# Patient Record
Sex: FEMALE | Race: WHITE | Employment: OTHER | URBAN - METROPOLITAN AREA
[De-identification: names, ages, dates, MRNs, and addresses within clinical notes are randomized per-mention and may not be internally consistent; named-entity substitution may affect disease eponyms.]

---

## 2017-01-04 ENCOUNTER — OFFICE VISIT (OUTPATIENT)
Dept: ORTHOPEDIC SURGERY | Age: 67
End: 2017-01-04

## 2017-01-04 VITALS
HEART RATE: 81 BPM | SYSTOLIC BLOOD PRESSURE: 113 MMHG | TEMPERATURE: 98.3 F | RESPIRATION RATE: 18 BRPM | BODY MASS INDEX: 24.04 KG/M2 | HEIGHT: 64 IN | DIASTOLIC BLOOD PRESSURE: 70 MMHG | OXYGEN SATURATION: 99 % | WEIGHT: 140.8 LBS

## 2017-01-04 DIAGNOSIS — M47.816 FACET ARTHRITIS OF LUMBAR REGION: ICD-10-CM

## 2017-01-04 DIAGNOSIS — M47.812 SPONDYLOSIS OF CERVICAL REGION WITHOUT MYELOPATHY OR RADICULOPATHY: Chronic | ICD-10-CM

## 2017-01-04 RX ORDER — HYDROCODONE BITARTRATE AND ACETAMINOPHEN 10; 325 MG/1; MG/1
1 TABLET ORAL
Qty: 30 TAB | Refills: 0 | Status: SHIPPED | OUTPATIENT
Start: 2017-01-15 | End: 2018-04-19 | Stop reason: SDUPTHER

## 2017-01-04 RX ORDER — METHOCARBAMOL 500 MG/1
250-500 TABLET, FILM COATED ORAL
Qty: 90 TAB | Refills: 2 | Status: SHIPPED | OUTPATIENT
Start: 2017-01-04 | End: 2018-04-19

## 2017-01-04 NOTE — PROGRESS NOTES
[facility] 2.  [address], [address]  [place], [address] [place] [address] Street  Phone: [phone]  Fax: [phone]        Brennon Davila  : 1950  PCP: Kamran Cervantes MD    PROGRESS NOTE      ASSESSMENT AND PLAN    Cervical and lumbar spondylosis    1. Mechanical symptoms w/prn medication use. Neuro intact. 2. Continue Norco, Robaxin, and Naprosyn prn.   3. Discussed with pt transitioning medication refills to Dr. Say Richards as pt has been stable and using small amounts of Norco.   4. Given back care instructions. Cont HEP    Follow-up Disposition:  Return if symptoms worsen or fail to improve. Risks and benefits of ongoing opiate therapy have been reviewed with the patient.  is appropriate. UDS results have been consistent. No pain behaviors. Pt has a good risk to benefit ratio which allows the pt to function in a home environment without side effects. HISTORY OF PRESENT ILLNESS  Rome León is a 77 y.o. female. Retired teacher. Pt presents to the office for a f/u visit for cervical and lumbar spondylosis. Pt continues to have back pain. She denies any shooting pain in her BLE. She denies any weakness in her BLE. Her pain will become intensified with bending, twisting, standing, and prolonged sitting. She denies any saddle paresthesia. She continues to have some neck pain. Her pain is mainly when she turns her head to the left. She denies any shooting pain in her BUE. She denies any weakness in her BUE. She denies any loss of dexterity. Pt takes Robaxin PRN. She takes Naprosyn PRN with benefit. Pt takes Norco  mg 3-4 tabs a week. The more she does, the more she will have to take. Pt denies any dizziness, confusion, uncontrolled constipation, and cravings due to controlled substances. Denies persistent fevers, chills, weight changes, neurogenic bowel or bladder symptoms. Pt denies recent ED visits or hospitalizations.      She has now retired and is enjoying her senior care. Pt is no longer wearing her CAM walker but continues to have problems with her right leg as she can still see a fracture line. Pt uses a machine on her leg every night. Pain Assessment  1/4/2017   Location of Pain Neck;Back;Knee   Location Modifiers -   Severity of Pain 3   Quality of Pain Sharp;Dull;Aching   Duration of Pain Persistent   Frequency of Pain Constant   Aggravating Factors Bending;Kneeling;Squatting;Standing;Walking;Stairs   Limiting Behavior Yes   Relieving Factors Rest;Other (Comment)   Relieving Factors Comment medication   Result of Injury No       PAST MEDICAL HISTORY   Past Medical History   Diagnosis Date    Anxiety disorder     Arthritis      Basal joint arthritis, left thumb    GERD (gastroesophageal reflux disease)     Headache(784.0)     Hearing reduced     Left knee pain     Lower back pain July 2010    Menopause      Age 39    Osteoarthritis of left knee     Pain of left thumb     Sacroiliitis ([address] 9/15/2010    SI (sacroiliac) joint dysfunction     Sinus trouble     Ulcer ([address] 2006     polyp, positive heme occult stools, denies ulcer       Past Surgical History   Procedure Laterality Date    Hx tubal ligation      Hx orthopaedic  9/15/2010     Left SI joint injection 30mg Celestone and 4 cc Lidocaine   . MEDICATIONS      Current Outpatient Prescriptions   Medication Sig Dispense Refill    [START ON 1/15/2017] HYDROcodone-acetaminophen (NORCO)  mg tablet Take 1 Tab by mouth daily as needed for Pain. Indications: PAIN 30 Tab 0    methocarbamol (ROBAXIN) 500 mg tablet Take 0.5-1 Tabs by mouth three (3) times daily as needed. Indications: MUSCLE SPASM 90 Tab 2    HYDROcodone-acetaminophen (NORCO)  mg tablet Take 1 Tab by mouth daily. Max Daily Amount: 1 Tab. Do not fill before 09/30/2016  Indications: PAIN 30 Tab 0    B.infantis-B.ani-B.long-B.bifi 10-15 mg TbEC Take  by mouth.       naproxen (NAPROSYN) 500 mg tablet Take 1 Tab by mouth two (2) times daily (with meals). Indications: OSTEOARTHRITIS 60 Tab 2    levocetirizine (XYZAL) 5 mg tablet Take 5 mg by mouth. q pm prn      FLUoxetine (PROZAC) 20 mg capsule Take 60 mg by mouth daily.  traZODone (DESYREL) 50 mg tablet Take 100 mg by mouth nightly.  ALPRAZolam (XANAX) 0.5 mg tablet Take  by mouth nightly as needed.  ascorbic acid (VITAMIN C) 500 mg tablet Take 500 mg by mouth daily.  cyanocobalamin (VITAMIN B-12) 500 mcg tablet Take 500 mcg by mouth daily.  omega-3 fatty acids-vitamin e (FISH OIL) 1,000 mg Cap Take 1 Cap by mouth daily.  calcium 600 mg Cap Take  by mouth daily.  aspirin 81 mg tablet Take 81 mg by mouth.  FERROUS SULFATE (SLOW FE PO) Take  by mouth daily.  OMEPRAZOLE MAGNESIUM (PRILOSEC OTC PO) Take 40 mg by mouth daily. ALLERGIES    Allergies   Allergen Reactions    Topamax [Topiramate] Other (comments)     Whole body tingling  Blurry vision          SOCIAL HISTORY    Social History     Social History    Marital status:      Spouse name: N/A    Number of children: N/A    Years of education: N/A     Occupational History    teacher's asst      full time     Social History Main Topics    Smoking status: Never Smoker    Smokeless tobacco: Never Used    Alcohol use No    Drug use: No    Sexual activity: Not on file      Comment: BTL     Other Topics Concern    Not on file     Social History Narrative     Social History Narrative      Problem Relation Age of Onset    Heart Failure Mother       at age 80 of congestive heart failure    Heart Disease Mother     Cancer Father       at age 54 of lung cancer; smoker and asbestos exposure       REVIEW OF SYSTEMS  Review of Systems   Constitutional: Negative for chills, fever and weight loss. Respiratory: Negative for shortness of breath. Cardiovascular: Negative for chest pain. Gastrointestinal: Negative for constipation. Negative for fecal incontinence   Genitourinary: Negative for dysuria. Negative for urinary incontinence   Musculoskeletal:        Per HPI   Skin: Negative for rash. Neurological: Negative for dizziness, tingling, tremors, focal weakness and headaches. Endo/Heme/Allergies: Does not bruise/bleed easily. Psychiatric/Behavioral: The patient does not have insomnia. PHYSICAL EXAMINATION  Visit Vitals    /70    Pulse 81    Temp 98.3 °F (36.8 °C) (Oral)    Resp 18    Ht 5' 4\" (1.626 m)    Wt 140 lb 12.8 oz (63.9 kg)    SpO2 99%    BMI 24.17 kg/m2         Accompanied by self. Constitutional:  Well developed, well nourished, in no acute distress. Psychiatric: Affect and mood are appropriate. Integumentary: No rashes or abrasions noted on exposed areas. Cardiovascular/Peripheral Vascular: Intact l pulses. No peripheral edema is noted. Lymphatic:  No evidence of lymphedema. No cervical lymphadenopathy. SPINE/MUSCULOSKELETAL EXAM      Cervical spine:  Neck is midline. Normal muscle tone. No focal atrophy is noted. Shoulder ROM intact. Negative Spurling's sign. Negative Tinel's sign. Negative Kyle's sign. Sensation grossly intact to light touch. Lumbar spine:  No rash, ecchymosis, or gross obliquity. No fasciculations. No focal atrophy is noted. Tenderness to palpation L4-5. No tenderness to palpation at the sciatic notch. SI joints non-tender. Trochanters non tender. Sensation grossly intact to light touch. MOTOR:          Biceps  Triceps Deltoids Wrist Ext Wrist Flex Hand Intrin   Right +4/5 +4/5 +4/5 +4/5 +4/5 +4/5   Left +4/5 +4/5 +4/5 +4/5 +4/5 +4/5         Hip Flex  Quads Hamstrings Ankle DF EHL Ankle PF   Right +4/5 +4/5 +4/5 +4/5 +4/5 +4/5   Left +4/5 +4/5 +4/5 +4/5 +4/5 +4/5     Straight Leg raise negative. Squat not tested. Ambulation without assistive device. FWB.     Written by Omega Westfall, as dictated by Ashley Nephew, MD.    Ms. Kaleb Warren may have a reminder for a \"due or due soon\" health maintenance. I have asked that she contact her primary care provider for follow-up on this health maintenance.

## 2017-01-04 NOTE — MR AVS SNAPSHOT
Visit Information Date & Time Provider Department Dept. Phone Encounter #  
 1/4/2017 10:45 AM Uyen Lopez MD [facility] [phone] [id number] Follow-up Instructions Return if symptoms worsen or fail to improve. Your Appointments 1/9/2017  1:40 PM  
Follow Up with Brittney Huynh MD  
[address] ([facility]) Appt Note: MRI Results from 12/30/16 [address], [address] [facility] [address]  
[phone] [address] [facility]  
  
    
 1/11/2017 10:30 AM  
PROBLEM VISIT with Dennis Kearney MD  
[address] [place] [address], [address] [facility] [address] ([facility]) Appt Note: LT HAND/THUMB/REQ [address], [address] [facility] [address]  
[phone] [address] [facility] Upcoming Health Maintenance Date Due Hepatitis C Screening 1950 FOBT Q 1 YEAR AGE 50-75 8/24/2000 ZOSTER VACCINE AGE 60> 8/24/2010 GLAUCOMA SCREENING Q2Y 8/24/2015 Pneumococcal 65+ Low/Medium Risk (1 of 2 - PCV13) 8/24/2015 MEDICARE YEARLY EXAM 8/24/2015 INFLUENZA AGE 9 TO ADULT 8/1/2016 BREAST CANCER SCRN MAMMOGRAM 8/16/2018 DTaP/Tdap/Td series (2 - Td) 5/17/2023 Allergies as of 1/4/2017  Review Complete On: 1/4/2017 By: Uyen Lopez MD  
  
 Severity Noted Reaction Type Reactions Topamax [Topiramate]  04/07/2016    Other (comments) Whole body tingling Blurry vision Current Immunizations  Never Reviewed Name Date Tdap 5/17/2013  7:57 PM  
  
 Not reviewed this visit You Were Diagnosed With   
  
 Codes Comments Neck pain, chronic     ICD-10-CM: M54.2, G89.29 ICD-9-CM: 723.1, 338.29 Spondylosis of cervical region without myelopathy or radiculopathy     ICD-10-CM: M47.812 ICD-9-CM: 721.0 Facet arthritis of lumbar region     ICD-10-CM: M46.96 
ICD-9-CM: 721.3 Vitals BP Pulse Temp Resp Height(growth percentile) Weight(growth percentile) 113/70 81 98.3 °F (36.8 °C) (Oral) 18 5' 4\" (1.626 m) 140 lb 12.8 oz (63.9 kg) SpO2 BMI OB Status Smoking Status 99% 24.17 kg/m2 Postmenopausal Never Smoker BMI and BSA Data Body Mass Index Body Surface Area  
 24.17 kg/m 2 1.7 m 2 Preferred Pharmacy Pharmacy Name Phone [facility] [address] [phone] Your Updated Medication List  
  
   
This list is accurate as of: 1/4/17 11:59 AM.  Always use your most recent med list.  
  
  
  
  
 aspirin 81 mg tablet Take 81 mg by mouth. B.infantis-B.ani-B.long-B.bifi 10-15 mg Tbec Take  by mouth.  
  
 calcium 600 mg Cap Take  by mouth daily. FISH OIL 1,000 mg Cap Generic drug:  omega-3 fatty acids-vitamin e Take 1 Cap by mouth daily. * HYDROcodone-acetaminophen  mg tablet Commonly known as:  [place] Take 1 Tab by mouth daily. Max Daily Amount: 1 Tab. Do not fill before 09/30/2016  Indications: PAIN  
  
 * HYDROcodone-acetaminophen  mg tablet Commonly known as:  [place] Take 1 Tab by mouth daily as needed for Pain. Indications: PAIN Start taking on:  1/15/2017  
  
 methocarbamol 500 mg tablet Commonly known as:  ROBAXIN Take 0.5-1 Tabs by mouth three (3) times daily as needed. Indications: MUSCLE SPASM  
  
 naproxen 500 mg tablet Commonly known as:  NAPROSYN Take 1 Tab by mouth two (2) times daily (with meals). Indications: OSTEOARTHRITIS PRILOSEC OTC PO Take 40 mg by mouth daily. PROzac 20 mg capsule Generic drug:  FLUoxetine Take 60 mg by mouth daily. SLOW FE PO Take  by mouth daily. traZODone 50 mg tablet Commonly known as:  Callaway Gutierrez Take 100 mg by mouth nightly. VITAMIN B-12 500 mcg tablet Generic drug:  cyanocobalamin Take 500 mcg by mouth daily. VITAMIN C 500 mg tablet Generic drug:  ascorbic acid (vitamin C) Take 500 mg by mouth daily. XANAX 0.5 mg tablet Generic drug:  ALPRAZolam  
Take  by mouth nightly as needed. XYZAL 5 mg tablet Generic drug:  levocetirizine Take 5 mg by mouth. q pm prn * Notice: This list has 2 medication(s) that are the same as other medications prescribed for you. Read the directions carefully, and ask your doctor or other care provider to review them with you. Prescriptions Printed Refills HYDROcodone-acetaminophen (NORCO)  mg tablet 0 Starting on: 1/15/2017 Sig: Take 1 Tab by mouth daily as needed for Pain. Indications: PAIN Class: Print Route: Oral  
  
Prescriptions Sent to Pharmacy Refills  
 methocarbamol (ROBAXIN) 500 mg tablet 2 Sig: Take 0.5-1 Tabs by mouth three (3) times daily as needed. Indications: MUSCLE SPASM Class: Normal  
 Pharmacy: [address]  #: [phone] Route: Oral  
  
Follow-up Instructions Return if symptoms worsen or fail to improve. To-Do List   
 01/04/2017 Lab:  DRUG SCREEN UR - W/ CONFIRM Patient Instructions [employer] Activation Thank you for requesting access to [employer]. Please follow the instructions below to securely access and download your online medical record. [employer] allows you to send messages to your doctor, view your test results, renew your prescriptions, schedule appointments, and more. How Do I Sign Up? 1. In your internet browser, go to [email or web address] 
2. Click on the First Time User? Click Here link in the Sign In box. You will be redirect to the New Member Sign Up page. 3. Enter your [employer] Access Code exactly as it appears below. You will not need to use this code after youve completed the sign-up process.  If you do not sign up before the expiration date, you must request a new code. [employer] Access Code: [email or web address] Expires: 2017  3:22 PM (This is the date your [employer] access code will ) 4. Enter the last four digits of your Social Security Number (xxxx) and Date of Birth (mm/dd/yyyy) as indicated and click Submit. You will be taken to the next sign-up page. 5. Create a [employer] ID. This will be your [employer] login ID and cannot be changed, so think of one that is secure and easy to remember. 6. Create a [employer] password. You can change your password at any time. 7. Enter your Password Reset Question and Answer. This can be used at a later time if you forget your password. 8. Enter your e-mail address. You will receive e-mail notification when new information is available in [address] 9. Click Sign Up. You can now view and download portions of your medical record. 10. Click the Download Summary menu link to download a portable copy of your medical information. Additional Information If you have questions, please visit the Frequently Asked Questions section of the [employer] website at [email or web address] Remember, [employer] is NOT to be used for urgent needs. For medical emergencies, dial 911. Back Care and Preventing Injuries: Care Instructions Your Care Instructions You can hurt your back doing many everyday activities: lifting a heavy box, bending down to garden, exercising at the gym, and even getting out of bed. But you can keep your back strong and healthy by doing some exercises. You also can follow a few tips for sitting, sleeping, and lifting to avoid hurting your back again. Talk to your doctor before you start an exercise program. Ask for help if you want to learn more about keeping your back healthy. Follow-up care is a key part of your treatment and safety.  Be sure to make and go to all appointments, and call your doctor if you are having problems. It's also a good idea to know your test results and keep a list of the medicines you take. How can you care for yourself at home? · Stay at a healthy weight to avoid strain on your lower back. · Do not smoke. Smoking increases the risk of osteoporosis, which weakens the spine. If you need help quitting, talk to your doctor about stop-smoking programs and medicines. These can increase your chances of quitting for good. · Make sure you sleep in a position that maintains your back's normal curves and on a mattress that feels comfortable. Sleep on your side with a pillow between your knees, or sleep on your back with a pillow under your knees. These positions can reduce strain on your back. · When you get out of bed, lie on your side and bend both knees. Drop your feet over the edge of the bed as you push up with both arms. Scoot to the edge of the bed. Make sure your feet are in line with your rear end (buttocks), and then stand up. · If you must stand for a long time, put one foot on a stool, ledge, or box. Exercise to strengthen your back and other muscles · Get at least 30 minutes of exercise on most days of the week. Walking is a good choice. You also may want to do other activities, such as running, swimming, cycling, or playing tennis or team sports. · Stretch your back muscles. Here are few exercises to try: ¨ Lie on your back with your knees bent and your feet flat on the floor. Gently pull one bent knee to your chest. Put that foot back on the floor, and then pull the other knee to your chest. Hold for 15 to 30 seconds. Repeat 2 to 4 times. ¨ Do pelvic tilts. Lie on your back with your knees bent. Tighten your stomach muscles. Pull your belly button (navel) in and up toward your ribs. You should feel like your back is pressing to the floor and your hips and pelvis are slightly lifting off the floor. Hold for 6 seconds while breathing smoothly. · Keep your core muscles strong. The muscles of your back, belly (abdomen), and buttocks support your spine. ¨ Pull in your belly, and imagine pulling your navel toward your spine. Hold this for 6 seconds, then relax. Remember to keep breathing normally as you tense your muscles. ¨ Do curl-ups. Always do them with your knees bent. Keep your low back on the floor, and curl your shoulders toward your knees using a smooth, slow motion. Keep your arms folded across your chest. If this bothers your neck, try putting your hands behind your neck (not your head), with your elbows spread apart. ¨ Lie on your back with your knees bent and your feet flat on the floor. Tighten your belly muscles, and then push with your feet and raise your buttocks up a few inches. Hold this position 6 seconds as you continue to breathe normally, then lower yourself slowly to the floor. Repeat 8 to 12 times. ¨ If you like group exercise, try Pilates or yoga. These classes have poses that strengthen the core muscles. Protect your back when you sit · Place a small pillow, a rolled-up towel, or a lumbar roll in the curve of your back if you need extra support. · Sit in a chair that is low enough to let you place both feet flat on the floor with both knees nearly level with your hips. If your chair or desk is too high, use a foot rest to raise your knees. · When driving, keep your knees nearly level with your hips. Sit straight, and drive with both hands on the steering wheel. Your arms should be in a slightly bent position. · Try a kneeling chair, which helps tilt your hips forward. This takes pressure off your lower back. · Try sitting on an exercise ball. It can rock from side to side, which helps keep your back loose. Lift properly · Squat down, bending at the hips and knees only. If you need to, put one knee to the floor and extend your other knee in front of you, bent at a right angle (half kneeling). · Press your chest straight forward. This helps keep your upper back straight while keeping a slight arch in your low back. · Hold the load as close to your body as possible, at the level of your navel. · Use your feet to change direction, taking small steps. · Lead with your hips as you change direction. Keep your shoulders in line with your hips as you move. Do not twist your body. · Set down your load carefully, squatting with your knees and hips only. When should you call for help? Watch closely for changes in your health, and be sure to contact your doctor if: 
· You want more exercises to make your back and other core muscles stronger. Where can you learn more? Go to http://mandy-yari.info/. Enter S810 in the search box to learn more about \"Back Care and Preventing Injuries: Care Instructions. \" Current as of: May 23, 2016 Content Version: 11.1 © 3613-3094 [employer]. Care instructions adapted under license by [employer] (which disclaims liability or warranty for this information). If you have questions about a medical condition or this instruction, always ask your healthcare professional. [place] [address] any warranty or liability for your use of this information. Introducing [facility]! [facility] introduces [employer] patient portal. Now you can access parts of your medical record, email your doctor's office, and request medication refills online. 1. In your internet browser, go to [email or web address] 2. Click on the First Time User? Click Here link in the Sign In box. You will see the New Member Sign Up page. 3. Enter your [employer] Access Code exactly as it appears below. You will not need to use this code after youve completed the sign-up process. If you do not sign up before the expiration date, you must request a new code.  
 
· [employer] Access Code: [email or web address] 
 Expires: 1/17/2017  3:22 PM 
 
4. Enter the last four digits of your Social Security Number (xxxx) and Date of Birth (mm/dd/yyyy) as indicated and click Submit. You will be taken to the next sign-up page. 5. Create a [employer] ID. This will be your [employer] login ID and cannot be changed, so think of one that is secure and easy to remember. 6. Create a [employer] password. You can change your password at any time. 7. Enter your Password Reset Question and Answer. This can be used at a later time if you forget your password. 8. Enter your e-mail address. You will receive e-mail notification when new information is available in [address] 9. Click Sign Up. You can now view and download portions of your medical record. 10. Click the Download Summary menu link to download a portable copy of your medical information. If you have questions, please visit the Frequently Asked Questions section of the [employer] website. Remember, [employer] is NOT to be used for urgent needs. For medical emergencies, dial 911. Now available from your iPhone and Android! Please provide this summary of care documentation to your next provider. Your primary care clinician is listed as Jaciel Marrero. If you have any questions after today's visit, please call [phone].

## 2017-01-04 NOTE — PATIENT INSTRUCTIONS
Fredio Activation    Thank you for requesting access to Fredio. Please follow the instructions below to securely access and download your online medical record. Fredio allows you to send messages to your doctor, view your test results, renew your prescriptions, schedule appointments, and more. How Do I Sign Up? 1. In your internet browser, go to [email or web address]  2. Click on the First Time User? Click Here link in the Sign In box. You will be redirect to the New Member Sign Up page. 3. Enter your Fredio Access Code exactly as it appears below. You will not need to use this code after youve completed the sign-up process. If you do not sign up before the expiration date, you must request a new code. Fredio Access Code: [email or web address]  Expires: 2017  3:22 PM (This is the date your Fredio access code will )    4. Enter the last four digits of your Social Security Number (xxxx) and Date of Birth (mm/dd/yyyy) as indicated and click Submit. You will be taken to the next sign-up page. 5. Create a Fredio ID. This will be your Fredio login ID and cannot be changed, so think of one that is secure and easy to remember. 6. Create a Fredio password. You can change your password at any time. 7. Enter your Password Reset Question and Answer. This can be used at a later time if you forget your password. 8. Enter your e-mail address. You will receive e-mail notification when new information is available in [address]. 9. Click Sign Up. You can now view and download portions of your medical record. 10. Click the Download Summary menu link to download a portable copy of your medical information. Additional Information    If you have questions, please visit the Frequently Asked Questions section of the Fredio website at [email or web address] Remember, Fredio is NOT to be used for urgent needs. For medical emergencies, dial 911.          Back Care and Preventing Injuries: Care Instructions  Your Care Instructions  You can hurt your back doing many everyday activities: lifting a heavy box, bending down to garden, exercising at the gym, and even getting out of bed. But you can keep your back strong and healthy by doing some exercises. You also can follow a few tips for sitting, sleeping, and lifting to avoid hurting your back again. Talk to your doctor before you start an exercise program. Ask for help if you want to learn more about keeping your back healthy. Follow-up care is a key part of your treatment and safety. Be sure to make and go to all appointments, and call your doctor if you are having problems. It's also a good idea to know your test results and keep a list of the medicines you take. How can you care for yourself at home? · Stay at a healthy weight to avoid strain on your lower back. · Do not smoke. Smoking increases the risk of osteoporosis, which weakens the spine. If you need help quitting, talk to your doctor about stop-smoking programs and medicines. These can increase your chances of quitting for good. · Make sure you sleep in a position that maintains your back's normal curves and on a mattress that feels comfortable. Sleep on your side with a pillow between your knees, or sleep on your back with a pillow under your knees. These positions can reduce strain on your back. · When you get out of bed, lie on your side and bend both knees. Drop your feet over the edge of the bed as you push up with both arms. Scoot to the edge of the bed. Make sure your feet are in line with your rear end (buttocks), and then stand up. · If you must stand for a long time, put one foot on a stool, ledge, or box. Exercise to strengthen your back and other muscles  · Get at least 30 minutes of exercise on most days of the week. Walking is a good choice.  You also may want to do other activities, such as running, swimming, cycling, or playing tennis or team sports. · Stretch your back muscles. Here are few exercises to try:  Ana Peri on your back with your knees bent and your feet flat on the floor. Gently pull one bent knee to your chest. Put that foot back on the floor, and then pull the other knee to your chest. Hold for 15 to 30 seconds. Repeat 2 to 4 times. ¨ Do pelvic tilts. Lie on your back with your knees bent. Tighten your stomach muscles. Pull your belly button (navel) in and up toward your ribs. You should feel like your back is pressing to the floor and your hips and pelvis are slightly lifting off the floor. Hold for 6 seconds while breathing smoothly. · Keep your core muscles strong. The muscles of your back, belly (abdomen), and buttocks support your spine. ¨ Pull in your belly, and imagine pulling your navel toward your spine. Hold this for 6 seconds, then relax. Remember to keep breathing normally as you tense your muscles. ¨ Do curl-ups. Always do them with your knees bent. Keep your low back on the floor, and curl your shoulders toward your knees using a smooth, slow motion. Keep your arms folded across your chest. If this bothers your neck, try putting your hands behind your neck (not your head), with your elbows spread apart. ¨ Lie on your back with your knees bent and your feet flat on the floor. Tighten your belly muscles, and then push with your feet and raise your buttocks up a few inches. Hold this position 6 seconds as you continue to breathe normally, then lower yourself slowly to the floor. Repeat 8 to 12 times. ¨ If you like group exercise, try Pilates or yoga. These classes have poses that strengthen the core muscles. Protect your back when you sit  · Place a small pillow, a rolled-up towel, or a lumbar roll in the curve of your back if you need extra support. · Sit in a chair that is low enough to let you place both feet flat on the floor with both knees nearly level with your hips.  If your chair or desk is too high, use a foot rest to raise your knees. · When driving, keep your knees nearly level with your hips. Sit straight, and drive with both hands on the steering wheel. Your arms should be in a slightly bent position. · Try a kneeling chair, which helps tilt your hips forward. This takes pressure off your lower back. · Try sitting on an exercise ball. It can rock from side to side, which helps keep your back loose. Lift properly  · Squat down, bending at the hips and knees only. If you need to, put one knee to the floor and extend your other knee in front of you, bent at a right angle (half kneeling). · Press your chest straight forward. This helps keep your upper back straight while keeping a slight arch in your low back. · Hold the load as close to your body as possible, at the level of your navel. · Use your feet to change direction, taking small steps. · Lead with your hips as you change direction. Keep your shoulders in line with your hips as you move. Do not twist your body. · Set down your load carefully, squatting with your knees and hips only. When should you call for help? Watch closely for changes in your health, and be sure to contact your doctor if:  · You want more exercises to make your back and other core muscles stronger. Where can you learn more? Go to http://mandy-yari.info/. Enter S810 in the search box to learn more about \"Back Care and Preventing Injuries: Care Instructions. \"  Current as of: May 23, 2016  Content Version: 11.1  © 8975-5765 [employer]. Care instructions adapted under license by [employer] (which disclaims liability or warranty for this information). If you have questions about a medical condition or this instruction, always ask your healthcare professional. [place] [address] any warranty or liability for your use of this information.

## 2017-01-11 ENCOUNTER — OFFICE VISIT (OUTPATIENT)
Dept: ORTHOPEDIC SURGERY | Age: 67
End: 2017-01-11

## 2017-01-11 VITALS
DIASTOLIC BLOOD PRESSURE: 64 MMHG | TEMPERATURE: 96.3 F | HEART RATE: 70 BPM | BODY MASS INDEX: 23.56 KG/M2 | WEIGHT: 138 LBS | RESPIRATION RATE: 15 BRPM | SYSTOLIC BLOOD PRESSURE: 115 MMHG | HEIGHT: 64 IN

## 2017-01-11 DIAGNOSIS — M17.12 PRIMARY OSTEOARTHRITIS OF LEFT KNEE: ICD-10-CM

## 2017-01-11 DIAGNOSIS — M18.12 PRIMARY OSTEOARTHRITIS OF FIRST CARPOMETACARPAL JOINT OF ONE HAND, LEFT: Primary | ICD-10-CM

## 2017-01-11 DIAGNOSIS — M79.642 LEFT HAND PAIN: ICD-10-CM

## 2017-01-11 RX ORDER — BETAMETHASONE SODIUM PHOSPHATE AND BETAMETHASONE ACETATE 3; 3 MG/ML; MG/ML
3 INJECTION, SUSPENSION INTRA-ARTICULAR; INTRALESIONAL; INTRAMUSCULAR; SOFT TISSUE ONCE
Qty: 0.5 ML | Refills: 0
Start: 2017-01-11 | End: 2017-01-11

## 2017-01-11 RX ORDER — BETAMETHASONE SODIUM PHOSPHATE AND BETAMETHASONE ACETATE 3; 3 MG/ML; MG/ML
6 INJECTION, SUSPENSION INTRA-ARTICULAR; INTRALESIONAL; INTRAMUSCULAR; SOFT TISSUE ONCE
Qty: 1 ML | Refills: 0 | Status: CANCELLED
Start: 2017-01-11 | End: 2017-01-11

## 2017-01-11 RX ORDER — BETAMETHASONE SODIUM PHOSPHATE AND BETAMETHASONE ACETATE 3; 3 MG/ML; MG/ML
6 INJECTION, SUSPENSION INTRA-ARTICULAR; INTRALESIONAL; INTRAMUSCULAR; SOFT TISSUE ONCE
Qty: 1 ML | Refills: 0
Start: 2017-01-11 | End: 2017-01-11 | Stop reason: CLARIF

## 2017-01-11 RX ORDER — BETAMETHASONE SODIUM PHOSPHATE AND BETAMETHASONE ACETATE 3; 3 MG/ML; MG/ML
3 INJECTION, SUSPENSION INTRA-ARTICULAR; INTRALESIONAL; INTRAMUSCULAR; SOFT TISSUE ONCE
Qty: 1 ML | Refills: 0
Start: 2017-01-11 | End: 2017-01-11

## 2017-01-11 RX ORDER — BETAMETHASONE SODIUM PHOSPHATE AND BETAMETHASONE ACETATE 3; 3 MG/ML; MG/ML
3 INJECTION, SUSPENSION INTRA-ARTICULAR; INTRALESIONAL; INTRAMUSCULAR; SOFT TISSUE ONCE
Qty: 1 ML | Refills: 0
Start: 2017-01-11 | End: 2017-01-11 | Stop reason: CLARIF

## 2017-01-11 RX ORDER — BUPIVACAINE HYDROCHLORIDE 2.5 MG/ML
0.5 INJECTION, SOLUTION EPIDURAL; INFILTRATION; INTRACAUDAL ONCE
Qty: 0.5 ML | Refills: 0
Start: 2017-01-11 | End: 2017-01-11

## 2017-01-11 RX ORDER — BETAMETHASONE SODIUM PHOSPHATE AND BETAMETHASONE ACETATE 3; 3 MG/ML; MG/ML
3 INJECTION, SUSPENSION INTRA-ARTICULAR; INTRALESIONAL; INTRAMUSCULAR; SOFT TISSUE ONCE
Qty: 0.5 ML | Refills: 0 | Status: CANCELLED
Start: 2017-01-11 | End: 2017-01-11

## 2017-01-11 RX ORDER — BUPIVACAINE HYDROCHLORIDE 2.5 MG/ML
0.5 INJECTION, SOLUTION EPIDURAL; INFILTRATION; INTRACAUDAL ONCE
Qty: 0.5 ML | Refills: 0 | Status: CANCELLED
Start: 2017-01-11 | End: 2017-01-11

## 2017-01-11 RX ORDER — BUPIVACAINE HYDROCHLORIDE 2.5 MG/ML
4 INJECTION, SOLUTION EPIDURAL; INFILTRATION; INTRACAUDAL ONCE
Qty: 4 ML | Refills: 0 | Status: CANCELLED
Start: 2017-01-11 | End: 2017-01-11

## 2017-01-11 RX ORDER — BETAMETHASONE SODIUM PHOSPHATE AND BETAMETHASONE ACETATE 3; 3 MG/ML; MG/ML
3 INJECTION, SUSPENSION INTRA-ARTICULAR; INTRALESIONAL; INTRAMUSCULAR; SOFT TISSUE ONCE
Qty: 1 ML | Refills: 0 | Status: CANCELLED
Start: 2017-01-11 | End: 2017-01-11

## 2017-01-11 NOTE — PROGRESS NOTES
Patient: Stephen Salmeron                MRN: [id number]       SSN: [id number]  YOB: 1950        AGE: 77 y.o. SEX: female    PCP: Alyssa Valera MD  01/11/17    Chief Complaint   Patient presents with    Finger Pain     left thumb      HISTORY:  Stephen Salmeron is a 77 y.o. female who is seen for left thumb pain. Pt reports having pain when performing various activities. Pt also has had left knee pain and will have pain while ambulating at times. Occupation, etc:  Ms. Anel George is recently retired as of 01/01/17 as a  at [employer] in [place]. Pt no longer works out at [facility] [employer]. Weight Metrics 1/11/2017 1/4/2017 11/16/2016 10/19/2016 10/3/2016 9/20/2016 8/23/2016   Weight 138 lb 140 lb 12.8 oz 140 lb 137 lb 136 lb 136 lb 12.8 oz 138 lb   BMI 23.69 kg/m2 24.17 kg/m2 24.03 kg/m2 23.52 kg/m2 23.34 kg/m2 23.48 kg/m2 23.69 kg/m2       Patient Active Problem List   Diagnosis Code    Bilateral SI joint pain M25.50    Low back pain M54.5    Weakness R53.1    Tremor R25.1    Neck pain, chronic M54.2, G89.29    Unsteady gait R26.81    Headache(784.0)     Sacroiliac dysfunction M53.3    Lumbar pain M54.5    Bulging lumbar disc M51.26    Facet hypertrophy of lumbar region M47.896    HNP (herniated nucleus pulposus), cervical M50.20    Myofascial pain M79.1    Sacroiliac joint dysfunction of both sides M53.3    Facet arthritis of lumbar region M46.96    Spondylosis of cervical region without myelopathy or radiculopathy M47.812     REVIEW OF SYSTEMS: All Below are Negative except: See HPI   Constitutional: negative for fever, chills, and weight loss. Cardiovascular: negative for chest pain, claudication, leg swelling, SOB, MUNSON   Gastrointestinal: Negative for pain, N/V/C/D, Blood in stool or urine, dysuria,  hematuria, incontinence, pelvic pain. Musculoskeletal: See HPI   Neurological: Negative for dizziness and weakness.    Negative for headaches, Visual changes, confusion, seizures   Phychiatric/Behavioral: Negative for depression, memory loss, substance  abuse. Extremities: Negative for hair changes, rash, or skin lesion changes. Hematologic: Negative for bleeding problems, bruising, pallor or swollen lymph  nodes   Peripheral Vascular: No calf pain, no circulation deficits. Social History     Social History    Marital status:      Spouse name: N/A    Number of children: N/A    Years of education: N/A     Occupational History    teacher's asst      full time     Social History Main Topics    Smoking status: Never Smoker    Smokeless tobacco: Never Used    Alcohol use No    Drug use: No    Sexual activity: Not on file      Comment: BTL     Other Topics Concern    Not on file     Social History Narrative      Allergies   Allergen Reactions    Topamax [Topiramate] Other (comments)     Whole body tingling  Blurry vision      Current Outpatient Prescriptions   Medication Sig    [START ON 1/15/2017] HYDROcodone-acetaminophen (NORCO)  mg tablet Take 1 Tab by mouth daily as needed for Pain. Indications: PAIN    methocarbamol (ROBAXIN) 500 mg tablet Take 0.5-1 Tabs by mouth three (3) times daily as needed. Indications: MUSCLE SPASM    HYDROcodone-acetaminophen (NORCO)  mg tablet Take 1 Tab by mouth daily. Max Daily Amount: 1 Tab. Do not fill before 09/30/2016  Indications: PAIN    B.infantis-B.ani-B.long-B.bifi 10-15 mg TbEC Take  by mouth.  FLUoxetine (PROZAC) 20 mg capsule Take 60 mg by mouth daily.  traZODone (DESYREL) 50 mg tablet Take 100 mg by mouth nightly.  ALPRAZolam (XANAX) 0.5 mg tablet Take  by mouth nightly as needed.  ascorbic acid (VITAMIN C) 500 mg tablet Take 500 mg by mouth daily.  cyanocobalamin (VITAMIN B-12) 500 mcg tablet Take 500 mcg by mouth daily.  omega-3 fatty acids-vitamin e (FISH OIL) 1,000 mg Cap Take 1 Cap by mouth daily.     calcium 600 mg Cap Take  by mouth daily.    aspirin 81 mg tablet Take 81 mg by mouth.  FERROUS SULFATE (SLOW FE PO) Take  by mouth daily.  OMEPRAZOLE MAGNESIUM (PRILOSEC OTC PO) Take 40 mg by mouth daily.  naproxen (NAPROSYN) 500 mg tablet Take 1 Tab by mouth two (2) times daily (with meals). Indications: OSTEOARTHRITIS    levocetirizine (XYZAL) 5 mg tablet Take 5 mg by mouth. q pm prn     No current facility-administered medications for this visit. PHYSICAL EXAMINATION:  Visit Vitals    /64    Pulse 70    Temp 96.3 °F (35.7 °C)    Resp 15    Ht 5' 4\" (1.626 m)    Wt 138 lb (62.6 kg)    BMI 23.69 kg/m2      ORTHO EXAMINATION: severe medial compartment of left knee. Examination Right knee Left knee   Skin Intact Intact   Range of motion 120-0 120-0   Effusion - -   Medial joint line tenderness + +   Lateral joint line tenderness - -   Popliteal tenderness - -   Osteophytes palpable - -   Angles - -   Patella crepitus - -   Anterior drawer - -   Lateral laxity - -   Medial laxity - -   Varus deformity - -   Valgus deformity - -   Pretibial edema - -   Calf tenderness - -         Examination Right Hand Left Hand   Skin Intact Intact   Deformity - -   Swelling - -   Tenderness - -   Finger flexion Full Full   Finger extension Full Full   Sensation Normal Normal   Capillary refill Normal Normal   Heberden's nodes - -   Dupuytren's - -     Examination Right Wrist Left Wrist   Skin Intact Intact   Tenderness - -   Flexion 40 40   Extension 30 30   Deformity - -   Effusion - -   Tinel's sign - -   Phalen's test - -   Finklestein maneuver - -   Pain with thumb abduction - -           PROCEDURE:  After discussing treatment options, patient's left knee was injected with 4 cc Marcaine and 1/2 cc Celestone. Left thumb was injected with 0.5 cc Marcaine and 0.5 cc Celestone. RADIOGRAPHS:  XR LEFT KNEE 12/30/16  IMPRESSION:  1.  Severe degenerative joint disease in medial compartment, with loss of the  cartilage and radial tear in the medial meniscus. Edema surrounding the intact  MCL. 2. Joint effusion and small popliteal cyst.    IMPRESSION:      ICD-10-CM ICD-9-CM    1. Arthritis of first metacarpal joint M19.90 716.90 betamethasone (CELESTONE SOLUSPAN) 6 mg/mL injection      BETAMETHASONE ACETATE & SODIUM PHOSPHATE INJECTION 3 MG EA.      DRAIN/INJECT SMALL JOINT/BURSA      bupivacaine, PF, (MARCAINE, PF,) 0.25 % (2.5 mg/mL) injection   2. Left hand pain M79.642 729.5 betamethasone (CELESTONE SOLUSPAN) 6 mg/mL injection      BETAMETHASONE ACETATE & SODIUM PHOSPHATE INJECTION 3 MG EA.      DRAIN/INJECT SMALL JOINT/BURSA      bupivacaine, PF, (MARCAINE, PF,) 0.25 % (2.5 mg/mL) injection   3. Severe medial compartment osteoarthritis of  the left knee M19.90 716.96 DISCONTINUED: betamethasone (CELESTONE SOLUSPAN) 6 mg/mL injection      DISCONTINUED: betamethasone (CELESTONE SOLUSPAN) 6 mg/mL injection      CANCELED: BETAMETHASONE ACETATE & SODIUM PHOSPHATE INJECTION 3 MG EA.      CANCELED: DRAIN/INJECT SMALL JOINT/BURSA      CANCELED: BETAMETHASONE ACETATE & SODIUM PHOSPHATE INJECTION 3 MG EA. PLAN:  Left knee injected 4 cc 0.25% Marcaine and 0.5 cc Celestone. L thumb basl joint injected 0.5 cc 0.25% Marcaine and 0.5 cc Celestone. She will follow up in 1 month. We will consider Euflexa injections if pain continues.         Scribed by Kojo Isidro ([facility]) as dictated by Jil Lennox, MD

## 2017-01-17 ENCOUNTER — OFFICE VISIT (OUTPATIENT)
Dept: ORTHOPEDIC SURGERY | Age: 67
End: 2017-01-17

## 2017-01-17 VITALS
WEIGHT: 138 LBS | HEART RATE: 84 BPM | SYSTOLIC BLOOD PRESSURE: 109 MMHG | TEMPERATURE: 97.7 F | BODY MASS INDEX: 23.56 KG/M2 | HEIGHT: 64 IN | DIASTOLIC BLOOD PRESSURE: 45 MMHG

## 2017-01-17 DIAGNOSIS — S92.354G CLOSED NONDISPLACED FRACTURE OF FIFTH METATARSAL BONE OF RIGHT FOOT WITH DELAYED HEALING, SUBSEQUENT ENCOUNTER: Primary | ICD-10-CM

## 2017-01-17 DIAGNOSIS — M17.12 PRIMARY OSTEOARTHRITIS OF LEFT KNEE: ICD-10-CM

## 2017-01-17 NOTE — PROCEDURES
FOOT X RAYS 3 VIEWS Right   1/17/2017    NON WEIGHTBEARING X-RAYS     X RAYS AT [address] [place] [address]  1/17/2017      Bones: No fractures or dislocations. No focal osteolytic or osteoblastic process     Bone Spurs: No significant bone spurs  Alignment foot: WNL   Joint Condition: No Significant OA  Soft Tissues: Normal, No radiopaque foreign body and No abnormal calcific densities to soft tissues   No ankle joint effusion in lateral projection. Mineralization: Suggests  no Osteopenia    I have personally reviewed the results of the above study.  The interpretation of this study is my professional opinion

## 2017-01-17 NOTE — PROGRESS NOTES
Patient is here for a follow up of an MRI of her left knee  She states she has popping and locking   Medial joint line tenderness of the left knee  Patient states the Dr. Joe Loja did give her an injection into the left knee and left thumb last week with relief.   In regards to her right foot, she states that pain is only \"once in awhile\"  She is currently still using the bone stimulator

## 2017-01-17 NOTE — MR AVS SNAPSHOT
Visit Information Date & Time Provider Department Dept. Phone Encounter #  
 1/17/2017  3:10 PM Ko Gibson, [address] [facility] [place] [id number] Upcoming Health Maintenance Date Due Hepatitis C Screening 1950 FOBT Q 1 YEAR AGE 50-75 8/24/2000 ZOSTER VACCINE AGE 60> 8/24/2010 GLAUCOMA SCREENING Q2Y 8/24/2015 Pneumococcal 65+ Low/Medium Risk (1 of 2 - PCV13) 8/24/2015 MEDICARE YEARLY EXAM 8/24/2015 INFLUENZA AGE 9 TO ADULT 8/1/2016 BREAST CANCER SCRN MAMMOGRAM 8/16/2018 DTaP/Tdap/Td series (2 - Td) 5/17/2023 Allergies as of 1/17/2017  Review Complete On: 1/17/2017 By: Ko Gibson MD  
  
 Severity Noted Reaction Type Reactions Topamax [Topiramate]  04/07/2016    Other (comments) Whole body tingling Blurry vision Current Immunizations  Never Reviewed Name Date Tdap 5/17/2013  7:57 PM  
  
 Not reviewed this visit You Were Diagnosed With   
  
 Codes Comments Closed nondisplaced fracture of fifth metatarsal bone of right foot with delayed healing, subsequent encounter    -  Primary ICD-10-CM: S92.354G ICD-9-CM: V54.19 Primary osteoarthritis of left knee     ICD-10-CM: M17.12 
ICD-9-CM: 715.16 Vitals BP Pulse Temp Height(growth percentile) Weight(growth percentile) BMI  
 109/45 84 97.7 °F (36.5 °C) 5' 4\" (1.626 m) 138 lb (62.6 kg) 23.69 kg/m2 OB Status Smoking Status Postmenopausal Never Smoker Vitals History BMI and BSA Data Body Mass Index Body Surface Area  
 23.69 kg/m 2 1.68 m 2 Preferred Pharmacy Pharmacy Name Phone [facility] [address] [phone] Your Updated Medication List  
  
   
This list is accurate as of: 1/17/17  4:37 PM.  Always use your most recent med list.  
  
  
  
  
 aspirin 81 mg tablet Take 81 mg by mouth. B.infantis-B.ani-B.long-B.bifi 10-15 mg Tbec Take  by mouth.  
  
 calcium 600 mg Cap Take  by mouth daily. FISH OIL 1,000 mg Cap Generic drug:  omega-3 fatty acids-vitamin e Take 1 Cap by mouth daily. * HYDROcodone-acetaminophen  mg tablet Commonly known as:  Elian Parikh Take 1 Tab by mouth daily. Max Daily Amount: 1 Tab. Do not fill before 09/30/2016  Indications: PAIN  
  
 * HYDROcodone-acetaminophen  mg tablet Commonly known as:  Elian Jl Take 1 Tab by mouth daily as needed for Pain. Indications: PAIN  
  
 methocarbamol 500 mg tablet Commonly known as:  ROBAXIN Take 0.5-1 Tabs by mouth three (3) times daily as needed. Indications: MUSCLE SPASM  
  
 naproxen 500 mg tablet Commonly known as:  NAPROSYN Take 1 Tab by mouth two (2) times daily (with meals). Indications: OSTEOARTHRITIS PRILOSEC OTC PO Take 40 mg by mouth daily. PROzac 20 mg capsule Generic drug:  FLUoxetine Take 60 mg by mouth daily. SLOW FE PO Take  by mouth daily. traZODone 50 mg tablet Commonly known as:  Carry [place] Take 100 mg by mouth nightly. VITAMIN B-12 500 mcg tablet Generic drug:  cyanocobalamin Take 500 mcg by mouth daily. VITAMIN C 500 mg tablet Generic drug:  ascorbic acid (vitamin C) Take 500 mg by mouth daily. XANAX 0.5 mg tablet Generic drug:  ALPRAZolam  
Take  by mouth nightly as needed. XYZAL 5 mg tablet Generic drug:  levocetirizine Take 5 mg by mouth. q pm prn * Notice: This list has 2 medication(s) that are the same as other medications prescribed for you. Read the directions carefully, and ask your doctor or other care provider to review them with you. We Performed the Following AMB POC XRAY, FOOT; COMPLETE, 3+ VIEW [73956 CPT(R)] Patient Instructions Discussed cortisone injection into the left knee A hinged knee brace may be beneficial 
 Discussed the possibility of an arthroscopy of the left knee due to the mechanical symptoms that she is experiencing Discussed the possibility of a uni-compartmental knee replacement May need a total knee replacement in the future Follow up with Dr. Chantell Quijano for the continued care for the left knee and left thumb Knee Arthroscopy: Before Your Surgery What is knee arthroscopy? Arthroscopy is a way to find problems and do surgery inside a joint without making a large cut (incision). Your doctor puts a lighted tube with a tiny camera and surgical tools through small incisions in your knee. The camera is called an arthroscope, or scope. In this surgery, your doctor may: · Remove or repair a torn piece of cartilage or loose bone. · Replace a torn anterior cruciate ligament (ACL) with a piece of tissue. This repair is called a graft. · Smooth the rough surfaces of your joint. Most people go home on the day of the surgery or the next day. If you have a simple injury, it may take at least 6 weeks to recover. It may take longer if your doctor had to repair damaged tissue. You will need to limit activity while your knee heals. You may need to have physical therapy (rehab) to help your knee get stronger. If you have a desk job, you may be able to go back to work a few days after treatment of a simple injury. If you lift things or stand or walk a lot at work, it may be 2 to 6 weeks before you can go back. After surgery and rehab, you will probably have less pain. Your knee should be stronger. You should be able to use your knee and leg better. Some people have to avoid lifting heavy objects. Follow-up care is a key part of your treatment and safety. Be sure to make and go to all appointments, and call your doctor if you are having problems. It's also a good idea to know your test results and keep a list of the medicines you take. What happens before surgery? Surgery can be stressful. This information will help you understand what you can expect. And it will help you safely prepare for surgery. Preparing for surgery · Understand exactly what surgery is planned, along with the risks, benefits, and other options. · Tell your doctors ALL the medicines, vitamins, supplements, and herbal remedies you take. Some of these can increase the risk of bleeding or interact with anesthesia. · If you take blood thinners, such as warfarin (Coumadin), clopidogrel (Plavix), or aspirin, be sure to talk to your doctor. He or she will tell you if you should stop taking these medicines before your surgery. Make sure that you understand exactly what your doctor wants you to do. · Your doctor will tell you which medicines to take or stop before your surgery. You may need to stop taking certain medicines a week or more before surgery. So talk to your doctor as soon as you can. · If you have an advance directive, let your doctor know. It may include a living will and a durable power of  for health care. Bring a copy to the hospital. If you don't have one, you may want to prepare one. It lets your doctor and loved ones know your health care wishes. Doctors advise that everyone prepare these papers before any type of surgery or procedure. What happens on the day of surgery? · Follow the instructions exactly about when to stop eating and drinking. If you don't, your surgery may be canceled. If your doctor told you to take your medicines on the day of surgery, take them with only a sip of water. · Take a bath or shower before you come in for your surgery. Do not apply lotions, perfumes, deodorants, or nail polish. · Do not shave the surgical site yourself. · Take off all jewelry and piercings. And take out contact lenses, if you wear them. At the hospital or surgery center · Bring a picture ID. · The area for surgery is often marked to make sure there are no errors. · You will be kept comfortable and safe by your anesthesia provider. The anesthesia may make you sleep. Or it may just numb the area being worked on. · The surgery will take about 1 to 2 hours. It depends on how much repair needs to be done to your knee. Going home · Be sure you have someone to drive you home. Anesthesia and pain medicine make it unsafe for you to drive. · You will be given more specific instructions about recovering from your surgery. They will cover things like diet, wound care, follow-up care, driving, and getting back to your normal routine. When should you call your doctor? · You have questions or concerns. · You don't understand how to prepare for your surgery. · You become ill before the surgery (such as fever, flu, or a cold). · You need to reschedule or have changed your mind about having the surgery. Where can you learn more? Go to http://mandy-yari.info/. Enter G796 in the search box to learn more about \"Knee Arthroscopy: Before Your Surgery. \" Current as of: June 17, 2016 Content Version: 11.1 © 0267-7079 [employer]. Care instructions adapted under license by [employer] (which disclaims liability or warranty for this information). If you have questions about a medical condition or this instruction, always ask your healthcare professional. [place] [address] any warranty or liability for your use of this information. Partial Knee Replacement: Before Your Surgery What is partial knee replacement? Partial knee replacement is used when one side of the knee is damaged. It replaces only the damaged part of the knee. Your doctor will make a cut in your knee. This cut is called an incision. It will leave a scar that usually fades with time. You may be able to go home the same day. If you have partials on both knees at once, you may need to stay in the hospital for a day or more. Most people go back to normal activities or work in 6 to 12 weeks. This depends on your health. It also depends on how well your knee does in your rehab program. This may take longer if you have both knees done at the same time. Follow-up care is a key part of your treatment and safety. Be sure to make and go to all appointments, and call your doctor if you are having problems. It's also a good idea to know your test results and keep a list of the medicines you take. What happens before surgery? Surgery can be stressful. This information will help you understand what you can expect. And it will help you safely prepare for surgery. Preparing for surgery · Understand exactly what surgery is planned, along with the risks, benefits, and other options. · Tell your doctors ALL the medicines, vitamins, supplements, and herbal remedies you take. Some of these can increase the risk of bleeding or interact with anesthesia. · If you take blood thinners, such as warfarin (Coumadin), clopidogrel (Plavix), or aspirin, be sure to talk to your doctor. He or she will tell you if you should stop taking these medicines before your surgery. Make sure that you understand exactly what your doctor wants you to do. · Your doctor will tell you which medicines to take or stop before your surgery. You may need to stop taking certain medicines a week or more before surgery. So talk to your doctor as soon as you can. · If you have an advance directive, let your doctor know. It may include a living will and a durable power of  for health care. Bring a copy to the hospital. If you don't have one, you may want to prepare one. It lets your doctor and loved ones know your health care wishes. Doctors advise that everyone prepare these papers before any type of surgery or procedure. · You may need to shower or bathe with a special soap the night before and the morning of your surgery. The soap contains chlorhexidine.  It reduces the amount of bacteria on your skin that could cause an infection after surgery. What happens on the day of surgery? · Follow the instructions exactly about when to stop eating and drinking. If you don't, your surgery may be canceled. If your doctor told you to take your medicines on the day of surgery, take them with only a sip of water. · Take a bath or shower before you come in for your surgery. Do not apply lotions, perfumes, deodorants, or nail polish. · Do not shave the surgical site yourself. · Take off all jewelry and piercings. And take out contact lenses, if you wear them. At the hospital or surgery center · Bring a picture ID. · The area for surgery is often marked to make sure there are no errors. · You will be kept comfortable and safe by your anesthesia provider. The anesthesia may make you sleep. Or it may just numb the area being worked on. 
[employer] may also get a shot of medicine into your spine. This will make your legs numb. You will not feel pain during the surgery. · You also will get antibiotics through an IV tube before surgery. This lowers the risk of an infection of the incision. · The surgery will take about 2 to 3 hours. Going home · Be sure you have someone to drive you home. Anesthesia and pain medicine make it unsafe for you to drive. · You will be given more specific instructions about recovering from your surgery. They will cover things like diet, wound care, follow-up care, driving, and getting back to your normal routine. When should you call your doctor? · You have questions or concerns. · You don't understand how to prepare for your surgery. · You become ill before the surgery (such as fever, flu, or a cold). · You need to reschedule or have changed your mind about having the surgery. Where can you learn more? Go to http://mandy-yari.info/. Enter [phone] in the search box to learn more about \"Partial Knee Replacement: Before Your Surgery. \" 
 Current as of: May 23, 2016 Content Version: 11.1 © 8375-3701 [employer]. Care instructions adapted under license by [employer] (which disclaims liability or warranty for this information). If you have questions about a medical condition or this instruction, always ask your healthcare professional. [address] any warranty or liability for your use of this information. Deciding About Knee Replacement Surgery What is knee replacement surgery? Knee replacement surgery replaces the worn ends of the thighbone (femur) and the lower leg bone (tibia) where they meet at the knee. Your doctor will take out the damaged bone and replace it with plastic and metal parts. These new parts may be attached to your bones with cement. You will need a lot of rehabilitation, or rehab, after surgery. This takes several weeks. But you should be able to start to walk, climb stairs, sit in and get up from chairs, and do other daily movements within a few days. What are rios points about this decision? · The decision you and your doctor make depends on how much pain and disability you have. It also depends on your age, health, and activity level. · Most people have this surgery only when they can no longer control knee pain with other treatments and when the pain disrupts their lives. · Rehab after this surgery means doing daily exercises for several weeks. · Most knee replacements last for at least 15 years. You may need to have the knee replaced again. Why might you choose to replace your knee? · You are not able to do most of your activities without pain. · You have very bad arthritis pain. Other treatments have not helped. · You do not have other health problems that would make surgery too risky. · You are not worried that you may need to have another knee replacement later in life. · You aren't worried about side effects.  These may include infections, blood clots, and loss of range of motion. · You are willing to do weeks of rehab. · You want to have the surgery before you lose too much of your ability to be active. If you are not able to stay active, strong, and flexible before the surgery, it can be harder to return to your normal activities after the surgery. Why might you choose not to replace your knee? · You can still do most of your activities. · You have other health problems that may make surgery too risky. · You want to try all other treatments first. 
· You want to avoid the side effects of surgery. · You can't do rehab after surgery. · You worry that you may need another knee replacement later in life. Your decision Thinking about the facts and your feelings can help you make a decision that is right for you. Be sure you understand the benefits and risks of your options, and think about what else you need to do before you make the decision. Where can you learn more? Go to http://mandy-yari.info/. Ángel Bolton in the search box to learn more about \"Deciding About Knee Replacement Surgery. \" Current as of: May 23, 2016 Content Version: 11.1 © 4373-4595 [employer]. Care instructions adapted under license by [employer] (which disclaims liability or warranty for this information). If you have questions about a medical condition or this instruction, always ask your healthcare professional. [address] any warranty or liability for your use of this information. Introducing [facility]! [facility] introduces [employer] W[employer] App patient portal. Now you can access parts of your medical record, email your doctor's office, and request medication refills online. 1. In your internet browser, go to [email or web address] 2. Click on the First Time User? Click Here link in the Sign In box. You will see the New Member Sign Up page. 3. Enter your [employer] Access Code exactly as it appears below. You will not need to use this code after youve completed the sign-up process. If you do not sign up before the expiration date, you must request a new code. · [employer] Access Code: [email or web address] Expires: 4/17/2017  4:37 PM 
 
4. Enter the last four digits of your Social Security Number (xxxx) and Date of Birth (mm/dd/yyyy) as indicated and click Submit. You will be taken to the next sign-up page. 5. Create a [employer] ID. This will be your [employer] login ID and cannot be changed, so think of one that is secure and easy to remember. 6. Create a [employer] password. You can change your password at any time. 7. Enter your Password Reset Question and Answer. This can be used at a later time if you forget your password. 8. Enter your e-mail address. You will receive e-mail notification when new information is available in [address]. 9. Click Sign Up. You can now view and download portions of your medical record. 10. Click the Download Summary menu link to download a portable copy of your medical information. If you have questions, please visit the Frequently Asked Questions section of the [employer] website. Remember, [employer] is NOT to be used for urgent needs. For medical emergencies, dial 911. Now available from your iPhone and Android! Please provide this summary of care documentation to your next provider. Your primary care clinician is listed as Ashley De Leon. If you have any questions after today's visit, please call [phone].

## 2017-01-17 NOTE — PATIENT INSTRUCTIONS
Discussed cortisone injection into the left knee  A hinged knee brace may be beneficial  Discussed the possibility of an arthroscopy of the left knee due to the mechanical symptoms that she is experiencing  Discussed the possibility of a uni-compartmental knee replacement  May need a total knee replacement in the future  Follow up with Dr. Phillip Andrade for the continued care for the left knee and left thumb     Knee Arthroscopy: Before Your Surgery  What is knee arthroscopy? Arthroscopy is a way to find problems and do surgery inside a joint without making a large cut (incision). Your doctor puts a lighted tube with a tiny camera and surgical tools through small incisions in your knee. The camera is called an arthroscope, or scope. In this surgery, your doctor may:  · Remove or repair a torn piece of cartilage or loose bone. · Replace a torn anterior cruciate ligament (ACL) with a piece of tissue. This repair is called a graft. · Smooth the rough surfaces of your joint. Most people go home on the day of the surgery or the next day. If you have a simple injury, it may take at least 6 weeks to recover. It may take longer if your doctor had to repair damaged tissue. You will need to limit activity while your knee heals. You may need to have physical therapy (rehab) to help your knee get stronger. If you have a desk job, you may be able to go back to work a few days after treatment of a simple injury. If you lift things or stand or walk a lot at work, it may be 2 to 6 weeks before you can go back. After surgery and rehab, you will probably have less pain. Your knee should be stronger. You should be able to use your knee and leg better. Some people have to avoid lifting heavy objects. Follow-up care is a key part of your treatment and safety. Be sure to make and go to all appointments, and call your doctor if you are having problems.  It's also a good idea to know your test results and keep a list of the medicines you take. What happens before surgery? Surgery can be stressful. This information will help you understand what you can expect. And it will help you safely prepare for surgery. Preparing for surgery  · Understand exactly what surgery is planned, along with the risks, benefits, and other options. · Tell your doctors ALL the medicines, vitamins, supplements, and herbal remedies you take. Some of these can increase the risk of bleeding or interact with anesthesia. · If you take blood thinners, such as warfarin (Coumadin), clopidogrel (Plavix), or aspirin, be sure to talk to your doctor. He or she will tell you if you should stop taking these medicines before your surgery. Make sure that you understand exactly what your doctor wants you to do. · Your doctor will tell you which medicines to take or stop before your surgery. You may need to stop taking certain medicines a week or more before surgery. So talk to your doctor as soon as you can. · If you have an advance directive, let your doctor know. It may include a living will and a durable power of  for health care. Bring a copy to the hospital. If you don't have one, you may want to prepare one. It lets your doctor and loved ones know your health care wishes. Doctors advise that everyone prepare these papers before any type of surgery or procedure. What happens on the day of surgery? · Follow the instructions exactly about when to stop eating and drinking. If you don't, your surgery may be canceled. If your doctor told you to take your medicines on the day of surgery, take them with only a sip of water. · Take a bath or shower before you come in for your surgery. Do not apply lotions, perfumes, deodorants, or nail polish. · Do not shave the surgical site yourself. · Take off all jewelry and piercings. And take out contact lenses, if you wear them. At the hospital or surgery center  · Bring a picture ID.   · The area for surgery is often marked to make sure there are no errors. · You will be kept comfortable and safe by your anesthesia provider. The anesthesia may make you sleep. Or it may just numb the area being worked on. · The surgery will take about 1 to 2 hours. It depends on how much repair needs to be done to your knee. Going home  · Be sure you have someone to drive you home. Anesthesia and pain medicine make it unsafe for you to drive. · You will be given more specific instructions about recovering from your surgery. They will cover things like diet, wound care, follow-up care, driving, and getting back to your normal routine. When should you call your doctor? · You have questions or concerns. · You don't understand how to prepare for your surgery. · You become ill before the surgery (such as fever, flu, or a cold). · You need to reschedule or have changed your mind about having the surgery. Where can you learn more? Go to http://mandy-yari.info/. Enter S534 in the search box to learn more about \"Knee Arthroscopy: Before Your Surgery. \"  Current as of: June 17, 2016  Content Version: 11.1  © 5028-5256 [employer]. Care instructions adapted under license by [employer] (which disclaims liability or warranty for this information). If you have questions about a medical condition or this instruction, always ask your healthcare professional. [address] any warranty or liability for your use of this information. Partial Knee Replacement: Before Your Surgery  What is partial knee replacement? Partial knee replacement is used when one side of the knee is damaged. It replaces only the damaged part of the knee. Your doctor will make a cut in your knee. This cut is called an incision. It will leave a scar that usually fades with time. You may be able to go home the same day.  If you have partials on both knees at once, you may need to stay in the hospital for a day or more.  Most people go back to normal activities or work in 6 to 12 weeks. This depends on your health. It also depends on how well your knee does in your rehab program. This may take longer if you have both knees done at the same time. Follow-up care is a key part of your treatment and safety. Be sure to make and go to all appointments, and call your doctor if you are having problems. It's also a good idea to know your test results and keep a list of the medicines you take. What happens before surgery? Surgery can be stressful. This information will help you understand what you can expect. And it will help you safely prepare for surgery. Preparing for surgery  · Understand exactly what surgery is planned, along with the risks, benefits, and other options. · Tell your doctors ALL the medicines, vitamins, supplements, and herbal remedies you take. Some of these can increase the risk of bleeding or interact with anesthesia. · If you take blood thinners, such as warfarin (Coumadin), clopidogrel (Plavix), or aspirin, be sure to talk to your doctor. He or she will tell you if you should stop taking these medicines before your surgery. Make sure that you understand exactly what your doctor wants you to do. · Your doctor will tell you which medicines to take or stop before your surgery. You may need to stop taking certain medicines a week or more before surgery. So talk to your doctor as soon as you can. · If you have an advance directive, let your doctor know. It may include a living will and a durable power of  for health care. Bring a copy to the hospital. If you don't have one, you may want to prepare one. It lets your doctor and loved ones know your health care wishes. Doctors advise that everyone prepare these papers before any type of surgery or procedure. · You may need to shower or bathe with a special soap the night before and the morning of your surgery. The soap contains chlorhexidine.  It reduces the amount of bacteria on your skin that could cause an infection after surgery. What happens on the day of surgery? · Follow the instructions exactly about when to stop eating and drinking. If you don't, your surgery may be canceled. If your doctor told you to take your medicines on the day of surgery, take them with only a sip of water. · Take a bath or shower before you come in for your surgery. Do not apply lotions, perfumes, deodorants, or nail polish. · Do not shave the surgical site yourself. · Take off all jewelry and piercings. And take out contact lenses, if you wear them. At the hospital or surgery center  · Bring a picture ID. · The area for surgery is often marked to make sure there are no errors. · You will be kept comfortable and safe by your anesthesia provider. The anesthesia may make you sleep. Or it may just numb the area being worked on.  [employer] may also get a shot of medicine into your spine. This will make your legs numb. You will not feel pain during the surgery. · You also will get antibiotics through an IV tube before surgery. This lowers the risk of an infection of the incision. · The surgery will take about 2 to 3 hours. Going home  · Be sure you have someone to drive you home. Anesthesia and pain medicine make it unsafe for you to drive. · You will be given more specific instructions about recovering from your surgery. They will cover things like diet, wound care, follow-up care, driving, and getting back to your normal routine. When should you call your doctor? · You have questions or concerns. · You don't understand how to prepare for your surgery. · You become ill before the surgery (such as fever, flu, or a cold). · You need to reschedule or have changed your mind about having the surgery. Where can you learn more? Go to http://mandy-yari.info/.   Enter [phone] in the search box to learn more about \"Partial Knee Replacement: Before Your Surgery. \"  Current as of: May 23, 2016  Content Version: 11.1  © 8480-7738 [employer] Care instructions adapted under license by [employer] (which disclaims liability or warranty for this information). If you have questions about a medical condition or this instruction, always ask your healthcare professional. [address] any warranty or liability for your use of this information. Deciding About Knee Replacement Surgery  What is knee replacement surgery? Knee replacement surgery replaces the worn ends of the thighbone (femur) and the lower leg bone (tibia) where they meet at the knee. Your doctor will take out the damaged bone and replace it with plastic and metal parts. These new parts may be attached to your bones with cement. You will need a lot of rehabilitation, or rehab, after surgery. This takes several weeks. But you should be able to start to walk, climb stairs, sit in and get up from chairs, and do other daily movements within a few days. What are rios points about this decision? · The decision you and your doctor make depends on how much pain and disability you have. It also depends on your age, health, and activity level. · Most people have this surgery only when they can no longer control knee pain with other treatments and when the pain disrupts their lives. · Rehab after this surgery means doing daily exercises for several weeks. · Most knee replacements last for at least 15 years. You may need to have the knee replaced again. Why might you choose to replace your knee? · You are not able to do most of your activities without pain. · You have very bad arthritis pain. Other treatments have not helped. · You do not have other health problems that would make surgery too risky. · You are not worried that you may need to have another knee replacement later in life. · You aren't worried about side effects.  These may include infections, blood clots, and loss of range of motion. · You are willing to do weeks of rehab. · You want to have the surgery before you lose too much of your ability to be active. If you are not able to stay active, strong, and flexible before the surgery, it can be harder to return to your normal activities after the surgery. Why might you choose not to replace your knee? · You can still do most of your activities. · You have other health problems that may make surgery too risky. · You want to try all other treatments first.  · You want to avoid the side effects of surgery. · You can't do rehab after surgery. · You worry that you may need another knee replacement later in life. Your decision  Thinking about the facts and your feelings can help you make a decision that is right for you. Be sure you understand the benefits and risks of your options, and think about what else you need to do before you make the decision. Where can you learn more? Go to http://mandyPolaryari.info/. Tyler Aparicio in the search box to learn more about \"Deciding About Knee Replacement Surgery. \"  Current as of: May 23, 2016  Content Version: 11.1  © 4638-7041 [employer]. Care instructions adapted under license by [employer] (which disclaims liability or warranty for this information). If you have questions about a medical condition or this instruction, always ask your healthcare professional. [address] any warranty or liability for your use of this information.    In regards to your right foot, you may discontinue the use of the bone stimulator  Follow up with Dr. Christoph Jacquesing in regards to your foot as needed

## 2017-01-17 NOTE — PROGRESS NOTES
AMBULATORY PROGRESS NOTE      Patient: Jonathan Horne             MRN: [id number]     SSN: [id number] Body mass index is 23.69 kg/(m^2). YOB: 1950     AGE: 77 y.o. EX: female    PCP: Oneida Lozoya MD    IMPRESSION/DIAGNOSIS /// TREATMENT PLAN /// HPI AND EXAMINATION         DIAGNOSES  1. Closed nondisplaced fracture of fifth metatarsal bone of right foot with delayed healing, subsequent encounter    2. Primary osteoarthritis of left knee        Orders Placed This Encounter    [11240] Foot Min 3V      Jonathan Horne understands her diagnoses and the proposed plan. Jonathan Horne IS A 77 y.o. female who presents to my outpatient office for evaluation of: The patient is here for followup being treated for a right fifth metatarsal base fracture. She has been using a bone stimulator for this fracture. She admits to having no major pain at all in her right foot at this current time. She had x-rays of her right foot today, three views, which shows a healed right fifth metatarsal base fracture. No acute fracture is seen. She also has a diagnosis of severe OA of her left knee medial compartment. She saw Dr. Doylene Meigs last week and he did do a cortisone injection in her left knee. IMPRESSION:      1. Healed right fifth metatarsal base fracture:  From my standpoint, I want to see her on a prn basis. 2. OA of the left knee, severe, medial compartment. She is doing to followup with Dr. Doylene Meigs for further care of her knee respectfully. I did review the MRI of her left knee with her. She states that Dr. Doylene Meigs went over her MRI of her left knee that was done on December 30, 2016. The MRI showed severe OA of the medial compartment of her left knee with a radial tear of the meniscus to the medial portion of her left knee.       ANKLE/FOOT right    Psychiatry: Alert, Oriented x 3; Speech normal in context and clarity,            Memory intact grossly, no involuntary movements - tremors, no dementia  Gait: normal  Tenderness: no tenderness  , Cutaneous: WNL, Joint Motion: WNL  Joint / Tendon Stability: No Ankle or Subtalar instability or joint laxity. No peroneal sublux ability or dislocation  Alignment: Forefoot, Midfoot, Hindfoot WNL. Neuro Motor/Sensory: NL/NL, Vascular: NL foot/ankle pulses  Lymphatics: No extremity lymphedema, No calf swelling, no tenderness to calf muscles. Final result (Exam End: 12/30/2016 11:18 AM) Reviewed    Study Result   Multi sequence multiplanar MR images of the left knee were obtained.     Severe degenerative joint disease in medial compartment, with loss of the  cartilage, subchondral edema in the medial tibial plateau. Radial tear in the  anterior horn and body of the medial meniscus with mild medial subluxation. Mild  edema surrounding the intact medial collateral ligament.     Cruciate ligaments are intact.     Lateral meniscus, lateral compartment cartilage and lateral collateral ligament  complex are intact.     Small joint effusion and small lobulated popliteal cyst. Patellar cartilage and  retinacula are intact. Extensor mechanism is intact. Prepatellar soft tissue  present. Patchy red marrow elements in the knee.     IMPRESSION  IMPRESSION:  1. Severe degenerative joint disease in medial compartment, with loss of the  cartilage and radial tear in the medial meniscus. Edema surrounding the intact  MCL.   2. Joint effusion and small popliteal cyst       CHART REVIEW     Past Medical History   Diagnosis Date    Anxiety disorder     Arthritis      Basal joint arthritis, left thumb    GERD (gastroesophageal reflux disease)     Headache(784.0)     Hearing reduced     Left knee pain     Lower back pain July 2010    Menopause      Age 39    Osteoarthritis of left knee     Pain of left thumb     Sacroiliitis ([address] 9/15/2010    SI (sacroiliac) joint dysfunction     Sinus trouble     Ulcer ([address] 2006 polyp, positive heme occult stools, denies ulcer     Current Outpatient Prescriptions   Medication Sig    HYDROcodone-acetaminophen (NORCO)  mg tablet Take 1 Tab by mouth daily as needed for Pain. Indications: PAIN    methocarbamol (ROBAXIN) 500 mg tablet Take 0.5-1 Tabs by mouth three (3) times daily as needed. Indications: MUSCLE SPASM    HYDROcodone-acetaminophen (NORCO)  mg tablet Take 1 Tab by mouth daily. Max Daily Amount: 1 Tab. Do not fill before 09/30/2016  Indications: PAIN    B.infantis-B.ani-B.long-B.bifi 10-15 mg TbEC Take  by mouth.  naproxen (NAPROSYN) 500 mg tablet Take 1 Tab by mouth two (2) times daily (with meals). Indications: OSTEOARTHRITIS    levocetirizine (XYZAL) 5 mg tablet Take 5 mg by mouth. q pm prn    FLUoxetine (PROZAC) 20 mg capsule Take 60 mg by mouth daily.  traZODone (DESYREL) 50 mg tablet Take 100 mg by mouth nightly.  ALPRAZolam (XANAX) 0.5 mg tablet Take  by mouth nightly as needed.  ascorbic acid (VITAMIN C) 500 mg tablet Take 500 mg by mouth daily.  cyanocobalamin (VITAMIN B-12) 500 mcg tablet Take 500 mcg by mouth daily.  omega-3 fatty acids-vitamin e (FISH OIL) 1,000 mg Cap Take 1 Cap by mouth daily.  calcium 600 mg Cap Take  by mouth daily.  aspirin 81 mg tablet Take 81 mg by mouth.  FERROUS SULFATE (SLOW FE PO) Take  by mouth daily.  OMEPRAZOLE MAGNESIUM (PRILOSEC OTC PO) Take 40 mg by mouth daily. No current facility-administered medications for this visit.       Allergies   Allergen Reactions    Topamax [Topiramate] Other (comments)     Whole body tingling  Blurry vision     Past Surgical History   Procedure Laterality Date    Hx tubal ligation      Hx orthopaedic  9/15/2010     Left SI joint injection 30mg Celestone and 4 cc Lidocaine     Social History     Occupational History    teacher's asst      full time     Social History Main Topics    Smoking status: Never Smoker    Smokeless tobacco: Never Used    Alcohol use No    Drug use: No    Sexual activity: Not on file      Comment: BTL     Family History   Problem Relation Age of Onset    Heart Failure Mother       at age 80 of congestive heart failure    Heart Disease Mother     Cancer Father       at age 54 of lung cancer; smoker and asbestos exposure       REVIEW OF SYSTEMS : 2017  ALL BELOW ARE Negative except : SEE HPI       Constitutional: Negative for fever, chills and weight loss. Neg Weight Loss  Cardiovascular: Negative for chest pain, claudication and leg swelling. SOB, MUNSON   Gastrointestinal/Urological: Negative for pain, N/V/D/C, Blood in stool or urine,dysuria,  Hematuria, Incontinence  Musculoskeletal: see HPI. Neurological: Negative for dizziness and weakness, headaches,Visual Changes or   Confusion, or Seizures,   Psychiatric/Behavioral: Negative for depression, memory loss and substance abuse. Extremities:  Negative for hair changes, rash or skin lesion changes. Hematologic: Negative for Bleeding problems, bruising, pallor or swollen lymph nodes. Peripheral Vascular: No calf pain, vascular vein tenderness to calf pain              No calf throbbing, posterior knee throbbing pain    DIAGNOSTIC IMAGING       FOOT X RAYS 3 VIEWS Right   2017    NON WEIGHTBEARING X-RAYS     X RAYS AT [address] [place] [address]  2017      Bones: No fractures or dislocations. No focal osteolytic or osteoblastic process     Bone Spurs: No significant bone spurs  Alignment foot: WNL   Joint Condition: No Significant OA  Soft Tissues: Normal, No radiopaque foreign body and No abnormal calcific densities to soft tissues   No ankle joint effusion in lateral projection. Mineralization: Suggests  no Osteopenia    I have personally reviewed the results of the above study. The interpretation of this study is my professional opinion     Please see above section of this report. I have personally reviewed the results of the above study.  The interpretation of this study is my professional opinion.       Inna Scott MD  1/17/2017  4:28 PM

## 2017-04-05 ENCOUNTER — OFFICE VISIT (OUTPATIENT)
Dept: ORTHOPEDIC SURGERY | Age: 67
End: 2017-04-05

## 2017-04-05 VITALS
WEIGHT: 143 LBS | BODY MASS INDEX: 24.41 KG/M2 | SYSTOLIC BLOOD PRESSURE: 116 MMHG | DIASTOLIC BLOOD PRESSURE: 59 MMHG | HEIGHT: 64 IN | TEMPERATURE: 97.7 F | HEART RATE: 78 BPM

## 2017-04-05 DIAGNOSIS — M18.12 PRIMARY OSTEOARTHRITIS OF FIRST CARPOMETACARPAL JOINT OF ONE HAND, LEFT: ICD-10-CM

## 2017-04-05 DIAGNOSIS — M47.816 FACET ARTHRITIS OF LUMBAR REGION: ICD-10-CM

## 2017-04-05 DIAGNOSIS — M25.562 LEFT KNEE PAIN, UNSPECIFIED CHRONICITY: ICD-10-CM

## 2017-04-05 DIAGNOSIS — G89.29 NECK PAIN, CHRONIC: ICD-10-CM

## 2017-04-05 DIAGNOSIS — M25.462 KNEE EFFUSION, LEFT: ICD-10-CM

## 2017-04-05 DIAGNOSIS — M21.612 BUNION, LEFT: ICD-10-CM

## 2017-04-05 DIAGNOSIS — M17.12 PRIMARY OSTEOARTHRITIS OF LEFT KNEE: Primary | ICD-10-CM

## 2017-04-05 DIAGNOSIS — M79.671 RIGHT FOOT PAIN: ICD-10-CM

## 2017-04-05 DIAGNOSIS — M47.812 SPONDYLOSIS OF CERVICAL REGION WITHOUT MYELOPATHY OR RADICULOPATHY: ICD-10-CM

## 2017-04-05 DIAGNOSIS — S92.354G CLOSED NONDISPLACED FRACTURE OF FIFTH METATARSAL BONE OF RIGHT FOOT WITH DELAYED HEALING, SUBSEQUENT ENCOUNTER: ICD-10-CM

## 2017-04-05 DIAGNOSIS — M71.22 POPLITEAL CYST, LEFT: ICD-10-CM

## 2017-04-05 DIAGNOSIS — M54.2 NECK PAIN, CHRONIC: ICD-10-CM

## 2017-04-05 DIAGNOSIS — M79.642 LEFT HAND PAIN: ICD-10-CM

## 2017-04-05 RX ORDER — BUPIVACAINE HYDROCHLORIDE 2.5 MG/ML
4 INJECTION, SOLUTION EPIDURAL; INFILTRATION; INTRACAUDAL ONCE
Qty: 4 ML | Refills: 0
Start: 2017-04-05 | End: 2017-04-05

## 2017-04-05 RX ORDER — BUPIVACAINE HYDROCHLORIDE 2.5 MG/ML
0.5 INJECTION, SOLUTION EPIDURAL; INFILTRATION; INTRACAUDAL ONCE
Qty: 0.5 ML | Refills: 0
Start: 2017-04-05 | End: 2017-04-05

## 2017-04-05 RX ORDER — BETAMETHASONE SODIUM PHOSPHATE AND BETAMETHASONE ACETATE 3; 3 MG/ML; MG/ML
3 INJECTION, SUSPENSION INTRA-ARTICULAR; INTRALESIONAL; INTRAMUSCULAR; SOFT TISSUE ONCE
Qty: 0.5 ML | Refills: 0
Start: 2017-04-05 | End: 2017-04-05

## 2017-04-05 RX ORDER — BETAMETHASONE SODIUM PHOSPHATE AND BETAMETHASONE ACETATE 3; 3 MG/ML; MG/ML
3 INJECTION, SUSPENSION INTRA-ARTICULAR; INTRALESIONAL; INTRAMUSCULAR; SOFT TISSUE ONCE
Qty: 1 ML | Refills: 0
Start: 2017-04-05 | End: 2017-04-05

## 2017-04-05 NOTE — PATIENT INSTRUCTIONS
Knee Arthritis: Exercises  Your Care Instructions  Here are some examples of exercises for knee arthritis. Start each exercise slowly. Ease off the exercise if you start to have pain. Your doctor or physical therapist will tell you when you can start these exercises and which ones will work best for you. How to do the exercises  Knee flexion with heel slide    1. Lie on your back with your knees bent. 2. Slide your heel back by bending your affected knee as far as you can. Then hook your other foot around your ankle to help pull your heel even farther back. 3. Hold for about 6 seconds, then rest for up to 10 seconds. 4. Repeat 8 to 12 times. 5. Switch legs and repeat steps 1 through 4, even if only one knee is sore. Quad sets    1. Sit with your affected leg straight and supported on the floor or a firm bed. Place a small, rolled-up towel under your knee. Your other leg should be bent, with that foot flat on the floor. 2. Tighten the thigh muscles of your affected leg by pressing the back of your knee down into the towel. 3. Hold for about 6 seconds, then rest for up to 10 seconds. 4. Repeat 8 to 12 times. 5. Switch legs and repeat steps 1 through 4, even if only one knee is sore. Straight-leg raises to the front    1. Lie on your back with your good knee bent so that your foot rests flat on the floor. Your affected leg should be straight. Make sure that your low back has a normal curve. You should be able to slip your hand in between the floor and the small of your back, with your palm touching the floor and your back touching the back of your hand. 2. Tighten the thigh muscles in your affected leg by pressing the back of your knee flat down to the floor. Hold your knee straight. 3. Keeping the thigh muscles tight and your leg straight, lift your affected leg up so that your heel is about 12 inches off the floor. Hold for about 6 seconds, then lower slowly.   4. Relax for up to 10 seconds between repetitions. 5. Repeat 8 to 12 times. 6. Switch legs and repeat steps 1 through 5, even if only one knee is sore. Active knee flexion    1. Lie on your stomach with your knees straight. If your kneecap is uncomfortable, roll up a washcloth and put it under your leg just above your kneecap. 2. Lift the foot of your affected leg by bending the knee so that you bring the foot up toward your buttock. If this motion hurts, try it without bending your knee quite as far. This may help you avoid any painful motion. 3. Slowly move your leg up and down. 4. Repeat 8 to 12 times. 5. Switch legs and repeat steps 1 through 4, even if only one knee is sore. Quadriceps stretch (facedown)    1. Lie flat on your stomach, and rest your face on the floor. 2. Wrap a towel or belt strap around the lower part of your affected leg. Then use the towel or belt strap to slowly pull your heel toward your buttock until you feel a stretch. 3. Hold for about 15 to 30 seconds, then relax your leg against the towel or belt strap. 4. Repeat 2 to 4 times. 5. Switch legs and repeat steps 1 through 4, even if only one knee is sore. Stationary exercise bike    If you do not have a stationary exercise bike at home, you can find one to ride at your local health club or community center. 1. Adjust the height of the bike seat so that your knee is slightly bent when your leg is extended downward. If your knee hurts when the pedal reaches the top, you can raise the seat so that your knee does not bend as much. 2. Start slowly. At first, try to do 5 to 10 minutes of cycling with little to no resistance. Then increase your time and the resistance bit by bit until you can do 20 to 30 minutes without pain. 3. If you start to have pain, rest your knee until your pain gets back to the level that is normal for you. Or cycle for less time or with less effort. Follow-up care is a key part of your treatment and safety.  Be sure to make and go to all appointments, and call your doctor if you are having problems. It's also a good idea to know your test results and keep a list of the medicines you take. Where can you learn more? Go to http://mandy-yari.info/. Enter C159 in the search box to learn more about \"Knee Arthritis: Exercises. \"  Current as of: May 23, 2016  Content Version: 11.2  © 20067421-9378 [employer]. Care instructions adapted under license by [employer] (which disclaims liability or warranty for this information). If you have questions about a medical condition or this instruction, always ask your healthcare professional. [place] [address] any warranty or liability for your use of this information. Joint Injections: Care Instructions  Your Care Instructions  Joint injections are shots into a joint, such as the knee. They may be used to put in medicines, such as pain relievers. Or they can be used to take out fluid. Sometimes the fluid is tested in a lab. This can help find the cause of a joint problem. A corticosteroid, or steroid, shot is used to reduce inflammation in tendons or joints. It is often used to treat problems such as arthritis, tendinitis, and bursitis. Steroids can be injected directly into a painful, inflamed joint. They can also help reduce inflammation of a bursa. A bursa is a sac of fluid. It cushions and lubricates areas where tendons, ligaments, skin, muscles, or bones rub against each other. A steroid shot can sometimes help with short-term pain relief when other treatments haven't worked. If steroid shots help, pain may improve for weeks or months. Follow-up care is a key part of your treatment and safety. Be sure to make and go to all appointments, and call your doctor if you are having problems. It's also a good idea to know your test results and keep a list of the medicines you take. How can you care for yourself at home?   · Put ice or a cold pack on the area for 10 to 20 minutes at a time. Put a thin cloth between the ice and your skin. · Take anti-inflammatory medicines to reduce pain, swelling, or inflammation. These include ibuprofen (Advil, Motrin) and naproxen (Aleve). Read and follow all instructions on the label. · Avoid strenuous activities for several days, especially those that put stress on the area where you got the shot. · If you have dressings over the area, keep them clean and dry. You may remove them when your doctor tells you to. When should you call for help? Call your doctor now or seek immediate medical care if:  · You have signs of infection, such as:  ¨ Increased pain, swelling, warmth, or redness. ¨ Red streaks leading from the site. ¨ Pus draining from the site. ¨ A fever. Watch closely for changes in your health, and be sure to contact your doctor if you have any problems. Where can you learn more? Go to http://mandyVidappyari.info/. Enter N616 in the search box to learn more about \"Joint Injections: Care Instructions. \"  Current as of: May 23, 2016  Content Version: 11.2  © 9032-2556 [employer]. Care instructions adapted under license by [employer] (which disclaims liability or warranty for this information). If you have questions about a medical condition or this instruction, always ask your healthcare professional. [address] any warranty or liability for your use of this information. Thumb Arthritis: Exercises  Your Care Instructions  Here are some examples of exercises for thumb arthritis. Start each exercise slowly. Ease off the exercise if you start to have pain. Your doctor or your physical or occupational therapist will tell you when you can start these exercises and which ones will work best for you. How to do the exercises  Thumb IP flexion    6. Place your forearm and hand on a table with your affected thumb pointing up.   7. With your other hand, hold your thumb steady just below the joint nearest your thumbnail. 8. Bend the tip of your thumb downward, then straighten it. 9. Repeat 8 to 12 times. 10. Switch hands and repeat steps 1 through 4, even if only one thumb is sore. Thumb MP flexion    6. Place your forearm and hand on a table with your affected thumb pointing up. 7. With your other hand, hold the base of your thumb and palm steady. 8. Bend your thumb downward where it meets your palm, then straighten it. 9. Repeat 8 to 12 times. 10. Switch hands and repeat steps 1 through 4, even if only one thumb is sore. Thumb opposition    7. With your affected hand, point your fingers and thumb straight up. Your wrist should be relaxed, following the line of your fingers and thumb. 8. Touch your affected thumb to each finger, one finger at a time. This will look like an \"okay\" sign, but try to keep your other fingers straight and pointing upward as much as you can. 9. Repeat 8 to 12 times. 10. Switch hands and repeat steps 1 through 3, even if only one thumb is sore. Follow-up care is a key part of your treatment and safety. Be sure to make and go to all appointments, and call your doctor if you are having problems. It's also a good idea to know your test results and keep a list of the medicines you take. Where can you learn more? Go to http://mandy-yari.info/. Enter Q395 in the search box to learn more about \"Thumb Arthritis: Exercises. \"  Current as of: May 23, 2016  Content Version: 11.2  © 3646-7984 [employer]. Care instructions adapted under license by [employer] (which disclaims liability or warranty for this information). If you have questions about a medical condition or this instruction, always ask your healthcare professional. [address] any warranty or liability for your use of this information.

## 2017-04-05 NOTE — PROGRESS NOTES
Patient: Zeinab Garsia                MRN: [id number]       SSN: [id number]  YOB: 1950        AGE: 77 y.o. SEX: female    PCP: Raymundo Alexander MD  04/05/17    Chief Complaint   Patient presents with    Knee Pain     Left     HISTORY:  Zeinab Garsia is a 77 y.o. female who is seen for increased left thumb and left knee pain. Pt reports having pain when performing various activities. Pt also has had left knee pain while ambulating at times. She had temporary response to a previous left knee and left basal joint cortisone injection. She is experiencing thumb pain with gripping and pinching. Pain Assessment  4/5/2017   Location of Pain Knee   Location Modifiers Left   Severity of Pain 2   Quality of Pain Sharp; Aching   Duration of Pain Persistent   Frequency of Pain Constant   Aggravating Factors Walking;Standing   Limiting Behavior -   Relieving Factors Rest   Result of Injury No     Occupation, etc:  Ms. Alma Rosa Summers recently retired on 1/1/17 as a  at [employer] in [place]. Pt no longer works out at [employer]. Current weight is 143 pounds. She is 5'4\" tall.       Weight Metrics 4/5/2017 1/17/2017 1/11/2017 1/4/2017 11/16/2016 10/19/2016 10/3/2016   Weight 143 lb 138 lb 138 lb 140 lb 12.8 oz 140 lb 137 lb 136 lb   BMI 24.55 kg/m2 23.69 kg/m2 23.69 kg/m2 24.17 kg/m2 24.03 kg/m2 23.52 kg/m2 23.34 kg/m2     Patient Active Problem List   Diagnosis Code    Bilateral SI joint pain M25.50    Low back pain M54.5    Weakness R53.1    Tremor R25.1    Neck pain, chronic M54.2, G89.29    Unsteady gait R26.81    Headache R51    Sacroiliac dysfunction M53.3    Lumbar pain M54.5    Bulging lumbar disc M51.26    Facet hypertrophy of lumbar region M47.896    HNP (herniated nucleus pulposus), cervical M50.20    Myofascial pain M79.1    Sacroiliac joint dysfunction of both sides M53.3    Facet arthritis of lumbar region ([address] M46.96    Spondylosis of cervical region without myelopathy or radiculopathy M47.812     REVIEW OF SYSTEMS: All Below are Negative except: See HPI   Constitutional: negative for fever, chills, and weight loss. Cardiovascular: negative for chest pain, claudication, leg swelling, SOB, MUNSON   Gastrointestinal: Negative for pain, N/V/C/D, Blood in stool or urine, dysuria,  hematuria, incontinence, pelvic pain. Musculoskeletal: See HPI   Neurological: Negative for dizziness and weakness. Negative for headaches, Visual changes, confusion, seizures   Phychiatric/Behavioral: Negative for depression, memory loss, substance  abuse. Extremities: Negative for hair changes, rash, or skin lesion changes. Hematologic: Negative for bleeding problems, bruising, pallor or swollen lymph  nodes   Peripheral Vascular: No calf pain, no circulation deficits. Social History     Social History    Marital status:      Spouse name: N/A    Number of children: N/A    Years of education: N/A     Occupational History    teacher's asst      full time     Social History Main Topics    Smoking status: Never Smoker    Smokeless tobacco: Never Used    Alcohol use No    Drug use: No    Sexual activity: Not on file      Comment: BTL     Other Topics Concern    Not on file     Social History Narrative      Allergies   Allergen Reactions    Topamax [Topiramate] Other (comments)     Whole body tingling  Blurry vision      Current Outpatient Prescriptions   Medication Sig    methocarbamol (ROBAXIN) 500 mg tablet Take 0.5-1 Tabs by mouth three (3) times daily as needed. Indications: MUSCLE SPASM    HYDROcodone-acetaminophen (NORCO)  mg tablet Take 1 Tab by mouth daily. Max Daily Amount: 1 Tab. Do not fill before 09/30/2016  Indications: PAIN    B.infantis-B.ani-B.long-B.bifi 10-15 mg TbEC Take  by mouth.  naproxen (NAPROSYN) 500 mg tablet Take 1 Tab by mouth two (2) times daily (with meals).  Indications: OSTEOARTHRITIS    levocetirizine (XYZAL) 5 mg tablet Take 5 mg by mouth. q pm prn    FLUoxetine (PROZAC) 20 mg capsule Take 60 mg by mouth daily.  traZODone (DESYREL) 50 mg tablet Take 100 mg by mouth nightly.  ALPRAZolam (XANAX) 0.5 mg tablet Take  by mouth nightly as needed.  ascorbic acid (VITAMIN C) 500 mg tablet Take 500 mg by mouth daily.  cyanocobalamin (VITAMIN B-12) 500 mcg tablet Take 500 mcg by mouth daily.  omega-3 fatty acids-vitamin e (FISH OIL) 1,000 mg Cap Take 1 Cap by mouth daily.  calcium 600 mg Cap Take  by mouth daily.  aspirin 81 mg tablet Take 81 mg by mouth.  FERROUS SULFATE (SLOW FE PO) Take  by mouth daily.  OMEPRAZOLE MAGNESIUM (PRILOSEC OTC PO) Take 40 mg by mouth daily.  HYDROcodone-acetaminophen (NORCO)  mg tablet Take 1 Tab by mouth daily as needed for Pain. Indications: PAIN     No current facility-administered medications for this visit. PHYSICAL EXAMINATION:  Visit Vitals    /59    Pulse 78    Temp 97.7 °F (36.5 °C) (Oral)    Ht 5' 4\" (1.626 m)    Wt 143 lb (64.9 kg)    BMI 24.55 kg/m2      ORTHO EXAMINATION:  Examination Right knee Left knee   Skin Intact Intact   Range of motion 120-0 115-0   Effusion - -   Medial joint line tenderness + +   Lateral joint line tenderness - -   Popliteal tenderness - -   Osteophytes palpable - +   Angles - -   Patella crepitus + +   Anterior drawer - -   Lateral laxity - -   Medial laxity - -   Varus deformity - -   Valgus deformity - -   Pretibial edema - -   Calf tenderness - -     Examination Left Hand   Skin Intact   Deformity -   Swelling -   Tenderness +, basal joint   Finger flexion Full   Finger extension Full   Sensation Normal   Capillary refill Normal   Heberden's nodes -   Dupuytren's -     PROCEDURE:  After discussing treatment options, patient's left knee was injected with 4 cc Marcaine and 1/2 cc Celestone.     After discussing treatment options, patient's left basal joint was injected with 1/2 cc Marcaine and 1/2 cc Celestone. Chart reviewed for the following:   Theda Phalen, MD, have reviewed the History, Physical and updated the Allergic reactions for [address] performed immediately prior to start of procedure:  Theda Phalen, MD, have performed the following reviews on Lucila Girard prior to the start of the procedure:            * Patient was identified by name and date of birth   * Agreement on procedure being performed was verified  * Risks and Benefits explained to the patient  * Procedure site verified and marked as necessary  * Patient was positioned for comfort  * Consent was obtained     Time: 10:51 AM     Date of procedure: 4/5/2017    Procedure performed by:  Fede Schmitz MD    Ms. Betsy García tolerated the procedure well with no complications. RADIOGRAPHS:  XR LEFT KNEE 12/30/16  IMPRESSION:  1. Severe degenerative joint disease in medial compartment, with loss of the  cartilage and radial tear in the medial meniscus. Edema surrounding the intact  MCL. 2. Joint effusion and small popliteal cyst.    IMPRESSION:      ICD-10-CM ICD-9-CM    1. Primary osteoarthritis of left knee M17.12 715.16 PROCEDURE AUTHORIZATION TO       betamethasone (CELESTONE SOLUSPAN) 6 mg/mL injection      BETAMETHASONE ACETATE & SODIUM PHOSPHATE INJECTION 3 MG EA.      DRAIN/INJECT LARGE JOINT/BURSA      bupivacaine, PF, (MARCAINE, PF,) 0.25 % (2.5 mg/mL) injection    Severe, medial compartment   2. Left knee pain, unspecified chronicity M25.562 719.46 betamethasone (CELESTONE SOLUSPAN) 6 mg/mL injection      BETAMETHASONE ACETATE & SODIUM PHOSPHATE INJECTION 3 MG EA.      DRAIN/INJECT LARGE JOINT/BURSA      bupivacaine, PF, (MARCAINE, PF,) 0.25 % (2.5 mg/mL) injection   3. Closed nondisplaced fracture of fifth metatarsal bone of right foot with delayed healing, subsequent encounter S92.354G V54.19    4.  Primary osteoarthritis of first carpometacarpal joint of one hand, left M18.12 715.14 betamethasone (CELESTONE SOLUSPAN) 6 mg/mL injection      BETAMETHASONE ACETATE & SODIUM PHOSPHATE INJECTION 3 MG EA.      DRAIN/INJECT SMALL JOINT/BURSA      bupivacaine, PF, (MARCAINE, PF,) 0.25 % (2.5 mg/mL) injection   5. Left hand pain M79.642 729.5    6. Spondylosis of cervical region without myelopathy or radiculopathy M47.812 721.0    7. Facet arthritis of lumbar region ([address] M46.96 721.3    8. Neck pain, chronic M54.2 723.1     G89.29 338.29    9. Right foot pain M79.671 729.5    10. Bunion, left M21.612 727.1    11. Knee effusion, left M25.462 719.06    12. Popliteal cyst, left M71.22 727.51      PLAN:  After discussing treatment options, patient's left knee was injected with 4 cc Marcaine and 1/2 cc Celestone. After discussing treatment options, patient's left basal joint was injected with 1/2 cc Marcaine and 1/2 cc Celestone. I will see her back as needed. Consider visco supplementation if pain continues. We discussed possible need for left knee arthroplasty potentially unicompartmental at some time in the future.       Scribed by [employer] ([address]) as dictated by Perla White MD

## 2017-05-03 ENCOUNTER — OFFICE VISIT (OUTPATIENT)
Dept: ORTHOPEDIC SURGERY | Age: 67
End: 2017-05-03

## 2017-05-03 VITALS
WEIGHT: 142.6 LBS | HEART RATE: 75 BPM | DIASTOLIC BLOOD PRESSURE: 50 MMHG | SYSTOLIC BLOOD PRESSURE: 105 MMHG | BODY MASS INDEX: 24.34 KG/M2 | TEMPERATURE: 97.5 F | HEIGHT: 64 IN

## 2017-05-03 DIAGNOSIS — M25.462 KNEE EFFUSION, LEFT: ICD-10-CM

## 2017-05-03 DIAGNOSIS — M47.812 SPONDYLOSIS OF CERVICAL REGION WITHOUT MYELOPATHY OR RADICULOPATHY: ICD-10-CM

## 2017-05-03 DIAGNOSIS — M79.642 LEFT HAND PAIN: ICD-10-CM

## 2017-05-03 DIAGNOSIS — M47.816 FACET ARTHRITIS OF LUMBAR REGION: ICD-10-CM

## 2017-05-03 DIAGNOSIS — G89.29 NECK PAIN, CHRONIC: ICD-10-CM

## 2017-05-03 DIAGNOSIS — M71.22 POPLITEAL CYST, LEFT: ICD-10-CM

## 2017-05-03 DIAGNOSIS — M18.12 PRIMARY OSTEOARTHRITIS OF FIRST CARPOMETACARPAL JOINT OF ONE HAND, LEFT: ICD-10-CM

## 2017-05-03 DIAGNOSIS — M25.562 LEFT KNEE PAIN, UNSPECIFIED CHRONICITY: ICD-10-CM

## 2017-05-03 DIAGNOSIS — M54.2 NECK PAIN, CHRONIC: ICD-10-CM

## 2017-05-03 DIAGNOSIS — M17.12 PRIMARY OSTEOARTHRITIS OF LEFT KNEE: Primary | ICD-10-CM

## 2017-05-03 DIAGNOSIS — S92.354G CLOSED NONDISPLACED FRACTURE OF FIFTH METATARSAL BONE OF RIGHT FOOT WITH DELAYED HEALING, SUBSEQUENT ENCOUNTER: ICD-10-CM

## 2017-05-03 RX ORDER — HYALURONATE SODIUM 10 MG/ML
2 SYRINGE (ML) INTRAARTICULAR ONCE
Qty: 2 ML | Refills: 0
Start: 2017-05-03 | End: 2017-05-03

## 2017-05-03 NOTE — PROGRESS NOTES
Patient: Christopher Mtz                MRN: [id number]       SSN: [id number]  YOB: 1950        AGE: 77 y.o. SEX: female    PCP: Natanael Ramirez MD  05/03/17    Chief Complaint   Patient presents with    Knee Pain     Left     HISTORY:  Christopher Mtz is a 77 y.o. female who is seen for increased left thumb and left knee pain. Pt reports having pain when performing various activities. Pt also has had left knee pain while ambulating at times. She had temporary response to previous left knee and left basal joint cortisone injections. She is experiencing thumb pain with gripping and pinching. She has trouble opening jars. She notes worsened left thumb aching at night. Pain Assessment  5/3/2017   Location of Pain Knee   Location Modifiers Left   Severity of Pain 5   Quality of Pain Aching; Sharp   Duration of Pain Persistent   Frequency of Pain Intermittent   Aggravating Factors Walking   Limiting Behavior Yes   Relieving Factors Other (Comment)   Relieving Factors Comment Norco   Result of Injury No     Occupation, etc:  Ms. Michaela Kerr recently retired on 1/1/17 as a  at [employer] in [place]. Pt no longer works out at [facility] [employer]. Current weight is 142 pounds. She is 5'4\" tall.       Weight Metrics 5/3/2017 4/5/2017 1/17/2017 1/11/2017 1/4/2017 11/16/2016 10/19/2016   Weight 142 lb 9.6 oz 143 lb 138 lb 138 lb 140 lb 12.8 oz 140 lb 137 lb   BMI 24.48 kg/m2 24.55 kg/m2 23.69 kg/m2 23.69 kg/m2 24.17 kg/m2 24.03 kg/m2 23.52 kg/m2     Patient Active Problem List   Diagnosis Code    Bilateral SI joint pain M25.50    Low back pain M54.5    Weakness R53.1    Tremor R25.1    Neck pain, chronic M54.2, G89.29    Unsteady gait R26.81    Headache R51    Sacroiliac dysfunction M53.3    Lumbar pain M54.5    Bulging lumbar disc M51.26    Facet hypertrophy of lumbar region M47.896    HNP (herniated nucleus pulposus), cervical M50.20    Myofascial pain M79.1    Sacroiliac joint dysfunction of both sides M53.3    Facet arthritis of lumbar region ([facility] [address] M46.96    Spondylosis of cervical region without myelopathy or radiculopathy M47.812     REVIEW OF SYSTEMS: All Below are Negative except: See HPI   Constitutional: negative for fever, chills, and weight loss. Cardiovascular: negative for chest pain, claudication, leg swelling, SOB, MUNSON   Gastrointestinal: Negative for pain, N/V/C/D, Blood in stool or urine, dysuria,  hematuria, incontinence, pelvic pain. Musculoskeletal: See HPI   Neurological: Negative for dizziness and weakness. Negative for headaches, Visual changes, confusion, seizures   Phychiatric/Behavioral: Negative for depression, memory loss, substance  abuse. Extremities: Negative for hair changes, rash, or skin lesion changes. Hematologic: Negative for bleeding problems, bruising, pallor or swollen lymph  nodes   Peripheral Vascular: No calf pain, no circulation deficits. Social History     Social History    Marital status:      Spouse name: N/A    Number of children: N/A    Years of education: N/A     Occupational History    teacher's asst      full time     Social History Main Topics    Smoking status: Never Smoker    Smokeless tobacco: Never Used    Alcohol use No    Drug use: No    Sexual activity: Not on file      Comment: BTL     Other Topics Concern    Not on file     Social History Narrative      Allergies   Allergen Reactions    Topamax [Topiramate] Other (comments)     Whole body tingling  Blurry vision      Current Outpatient Prescriptions   Medication Sig    methocarbamol (ROBAXIN) 500 mg tablet Take 0.5-1 Tabs by mouth three (3) times daily as needed. Indications: MUSCLE SPASM    HYDROcodone-acetaminophen (NORCO)  mg tablet Take 1 Tab by mouth daily. Max Daily Amount: 1 Tab. Do not fill before 09/30/2016  Indications: PAIN    B.infantis-B.ani-B.long-B.bifi 10-15 mg TbEC Take  by mouth.     levocetirizine (XYZAL) 5 mg tablet Take 5 mg by mouth. q pm prn    FLUoxetine (PROZAC) 20 mg capsule Take 60 mg by mouth daily.  traZODone (DESYREL) 50 mg tablet Take 100 mg by mouth nightly.  ALPRAZolam (XANAX) 0.5 mg tablet Take  by mouth nightly as needed.  ascorbic acid (VITAMIN C) 500 mg tablet Take 500 mg by mouth daily.  cyanocobalamin (VITAMIN B-12) 500 mcg tablet Take 500 mcg by mouth daily.  omega-3 fatty acids-vitamin e (FISH OIL) 1,000 mg Cap Take 1 Cap by mouth daily.  calcium 600 mg Cap Take  by mouth daily.  aspirin 81 mg tablet Take 81 mg by mouth.  FERROUS SULFATE (SLOW FE PO) Take  by mouth daily.  OMEPRAZOLE MAGNESIUM (PRILOSEC OTC PO) Take 40 mg by mouth daily.  HYDROcodone-acetaminophen (NORCO)  mg tablet Take 1 Tab by mouth daily as needed for Pain. Indications: PAIN    naproxen (NAPROSYN) 500 mg tablet Take 1 Tab by mouth two (2) times daily (with meals). Indications: OSTEOARTHRITIS     No current facility-administered medications for this visit.        PHYSICAL EXAMINATION:  Visit Vitals    /50    Pulse 75    Temp 97.5 °F (36.4 °C) (Oral)    Ht 5' 4\" (1.626 m)    Wt 142 lb 9.6 oz (64.7 kg)    BMI 24.48 kg/m2      ORTHO EXAMINATION:  Examination Right knee Left knee   Skin Intact Intact   Range of motion 120-0 115-0   Effusion - -   Medial joint line tenderness + +   Lateral joint line tenderness - -   Popliteal tenderness - -   Osteophytes palpable - +   Angles - -   Patella crepitus + +   Anterior drawer - -   Lateral laxity - -   Medial laxity - -   Varus deformity - -   Valgus deformity - -   Pretibial edema - -   Calf tenderness - -     Examination Left Hand   Skin Intact   Deformity -   Swelling -   Tenderness +, basal joint   Finger flexion Full   Finger extension Full   Sensation Normal   Capillary refill Normal   Heberden's nodes -   Dupuytren's -   Palpable basal joint osteophytes    PROCEDURE:  After discussing treatment options, patient's left knee was injected with 2 cc of Euflexxa. Chart reviewed for the following:   Sharifa Elaine MD, have reviewed the History, Physical and updated the Allergic reactions for [address] performed immediately prior to start of procedure:  Sharifa Elaine MD, have performed the following reviews on Susan Low prior to the start of the procedure:            * Patient was identified by name and date of birth   * Agreement on procedure being performed was verified  * Risks and Benefits explained to the patient  * Procedure site verified and marked as necessary  * Patient was positioned for comfort  * Consent was obtained     Time: 11:12 AM     Date of procedure: 5/3/2017    Procedure performed by:  Bee Lamas MD    Ms. Chang Sandhu tolerated the procedure well with no complications. RADIOGRAPHS:  XR LEFT KNEE 12/30/16  IMPRESSION:  1. Severe degenerative joint disease in medial compartment, with loss of the cartilage and radial tear in the medial meniscus. Edema surrounding the intact MCL. 2. Joint effusion and small popliteal cyst.    IMPRESSION:      ICD-10-CM ICD-9-CM    1. Primary osteoarthritis of left knee M17.12 715.16 [place] DRAIN/INJECT LARGE JOINT/BURSA      EUFLEXXA INJECTION PER DOSE      sodium hyaluronate (SUPARTZ FX/HYALGAN/GENIVSC) 10 mg/mL syrg injection    Severe, medial compartment   2. Left knee pain, unspecified chronicity M25.562 719.46 [place] DRAIN/INJECT LARGE JOINT/BURSA      EUFLEXXA INJECTION PER DOSE      sodium hyaluronate (SUPARTZ FX/HYALGAN/GENIVSC) 10 mg/mL syrg injection   3. Closed nondisplaced fracture of fifth metatarsal bone of right foot with delayed healing, subsequent encounter S92.354G V54.19    4. Primary osteoarthritis of first carpometacarpal joint of one hand, left M18.12 715.14    5. Left hand pain M79.642 729.5    6. Spondylosis of cervical region without myelopathy or radiculopathy M47.812 721.0    7.  Facet arthritis of lumbar region ([facility] [address] M46.96 721.3    8. Neck pain, chronic M54.2 723.1     G89.29 338.29    9. Knee effusion, left M25.462 719.06    10. Popliteal cyst, left M71.22 727.51      PLAN:  After discussing treatment options, patient's left knee was injected with 2 cc of Euflexxa. I will see her back in 1 week for her second Euflexxa injection. We discussed possible need for left knee arthroplasty potentially unicompartmental at some time in the future. There is no need for left thumb surgery at this time.       Scribed by [employer] ([address]) as dictated by José Antonio Fernandes MD

## 2017-05-03 NOTE — PATIENT INSTRUCTIONS
Knee Arthritis: Exercises  Your Care Instructions  Here are some examples of exercises for knee arthritis. Start each exercise slowly. Ease off the exercise if you start to have pain. Your doctor or physical therapist will tell you when you can start these exercises and which ones will work best for you. How to do the exercises  Knee flexion with heel slide    1. Lie on your back with your knees bent. 2. Slide your heel back by bending your affected knee as far as you can. Then hook your other foot around your ankle to help pull your heel even farther back. 3. Hold for about 6 seconds, then rest for up to 10 seconds. 4. Repeat 8 to 12 times. 5. Switch legs and repeat steps 1 through 4, even if only one knee is sore. Quad sets    1. Sit with your affected leg straight and supported on the floor or a firm bed. Place a small, rolled-up towel under your knee. Your other leg should be bent, with that foot flat on the floor. 2. Tighten the thigh muscles of your affected leg by pressing the back of your knee down into the towel. 3. Hold for about 6 seconds, then rest for up to 10 seconds. 4. Repeat 8 to 12 times. 5. Switch legs and repeat steps 1 through 4, even if only one knee is sore. Straight-leg raises to the front    1. Lie on your back with your good knee bent so that your foot rests flat on the floor. Your affected leg should be straight. Make sure that your low back has a normal curve. You should be able to slip your hand in between the floor and the small of your back, with your palm touching the floor and your back touching the back of your hand. 2. Tighten the thigh muscles in your affected leg by pressing the back of your knee flat down to the floor. Hold your knee straight. 3. Keeping the thigh muscles tight and your leg straight, lift your affected leg up so that your heel is about 12 inches off the floor. Hold for about 6 seconds, then lower slowly.   4. Relax for up to 10 seconds between repetitions. 5. Repeat 8 to 12 times. 6. Switch legs and repeat steps 1 through 5, even if only one knee is sore. Active knee flexion    1. Lie on your stomach with your knees straight. If your kneecap is uncomfortable, roll up a washcloth and put it under your leg just above your kneecap. 2. Lift the foot of your affected leg by bending the knee so that you bring the foot up toward your buttock. If this motion hurts, try it without bending your knee quite as far. This may help you avoid any painful motion. 3. Slowly move your leg up and down. 4. Repeat 8 to 12 times. 5. Switch legs and repeat steps 1 through 4, even if only one knee is sore. Quadriceps stretch (facedown)    1. Lie flat on your stomach, and rest your face on the floor. 2. Wrap a towel or belt strap around the lower part of your affected leg. Then use the towel or belt strap to slowly pull your heel toward your buttock until you feel a stretch. 3. Hold for about 15 to 30 seconds, then relax your leg against the towel or belt strap. 4. Repeat 2 to 4 times. 5. Switch legs and repeat steps 1 through 4, even if only one knee is sore. Stationary exercise bike    If you do not have a stationary exercise bike at home, you can find one to ride at your local health club or community center. 1. Adjust the height of the bike seat so that your knee is slightly bent when your leg is extended downward. If your knee hurts when the pedal reaches the top, you can raise the seat so that your knee does not bend as much. 2. Start slowly. At first, try to do 5 to 10 minutes of cycling with little to no resistance. Then increase your time and the resistance bit by bit until you can do 20 to 30 minutes without pain. 3. If you start to have pain, rest your knee until your pain gets back to the level that is normal for you. Or cycle for less time or with less effort. Follow-up care is a key part of your treatment and safety.  Be sure to make and go to all appointments, and call your doctor if you are having problems. It's also a good idea to know your test results and keep a list of the medicines you take. Where can you learn more? Go to http://mandy-yari.info/. Enter C159 in the search box to learn more about \"Knee Arthritis: Exercises. \"  Current as of: May 23, 2016  Content Version: 11.2  © 2006-2017 [employer]. Care instructions adapted under license by [employer] (which disclaims liability or warranty for this information). If you have questions about a medical condition or this instruction, always ask your healthcare professional. [address] any warranty or liability for your use of this information. Hyaluronic Acid (By injection)   Hyaluronic Acid (psh-ou-aem-ON-ate AS-id)  Treats severe pain in your knee due to osteoarthritis. Brand Name(s): Euflexxa, Gel-One, GelSyn-3, GenVisc 850, Hyalgan, Hymovis, Monovisc, Orthovisc, Supartz FX   There may be other brand names for this medicine. When This Medicine Should Not Be Used: This medicine is not right for everyone. You should not receive it if you had an allergic reaction to hyaluronic acid or if you have a bleeding disorder. How to Use This Medicine:   Injectable  · Your doctor will tell you how many injections you will need. This medicine is injected into your knee joint. · A nurse or other health provider will give you this medicine. Drugs and Foods to Avoid:      Ask your doctor or pharmacist before using any other medicine, including over-the-counter medicines, vitamins, and herbal products. Warnings While Using This Medicine:   · Tell your doctor if you are pregnant or breastfeeding, or if you have any allergies, including to birds, feathers, or eggs. · Rest your knee for 48 hours after you receive an injection. Do not do strenuous, weightbearing activities, such as jogging or tennis.  Avoid activities that keep you standing for longer than 1 hour. Possible Side Effects While Using This Medicine:   Call your doctor right away if you notice any of these side effects:  · Allergic reaction: Itching or hives, swelling in your face or hands, swelling or tingling in your mouth or throat, chest tightness, trouble breathing  If you notice these less serious side effects, talk with your doctor:   · Mild increase in pain or swelling in your knee  · Pain, redness, or swelling where the medicine is injected  If you notice other side effects that you think are caused by this medicine, tell your doctor. Call your doctor for medical advice about side effects. You may report side effects to FDA at 0-086-FDA-1737  © 2017 [facility] Information is for End User's use only and may not be sold, redistributed or otherwise used for commercial purposes. The above information is an  only. It is not intended as medical advice for individual conditions or treatments. Talk to your doctor, nurse or pharmacist before following any medical regimen to see if it is safe and effective for you.

## 2017-05-10 ENCOUNTER — OFFICE VISIT (OUTPATIENT)
Dept: ORTHOPEDIC SURGERY | Age: 67
End: 2017-05-10

## 2017-05-10 VITALS
DIASTOLIC BLOOD PRESSURE: 64 MMHG | HEART RATE: 78 BPM | WEIGHT: 141 LBS | TEMPERATURE: 97.5 F | SYSTOLIC BLOOD PRESSURE: 105 MMHG | BODY MASS INDEX: 24.07 KG/M2 | HEIGHT: 64 IN

## 2017-05-10 DIAGNOSIS — M71.22 POPLITEAL CYST, LEFT: ICD-10-CM

## 2017-05-10 DIAGNOSIS — M25.562 LEFT KNEE PAIN, UNSPECIFIED CHRONICITY: ICD-10-CM

## 2017-05-10 DIAGNOSIS — M17.12 PRIMARY OSTEOARTHRITIS OF LEFT KNEE: Primary | ICD-10-CM

## 2017-05-10 DIAGNOSIS — M25.462 KNEE EFFUSION, LEFT: ICD-10-CM

## 2017-05-10 RX ORDER — HYALURONATE SODIUM 10 MG/ML
2 SYRINGE (ML) INTRAARTICULAR ONCE
Qty: 2 ML | Refills: 0
Start: 2017-05-10 | End: 2017-05-10

## 2017-05-10 NOTE — PROGRESS NOTES
Patient: Susan Low                MRN: [id number]       SSN: [id number]  YOB: 1950        AGE: 77 y.o. SEX: female  Body mass index is 24.2 kg/(m^2). PCP: Aubrey Thurman MD  05/10/17    Chief Complaint   Patient presents with    Knee Pain     left, Euflexxa #2     HISTORY:  Susan Low is a 77 y.o. female who is seen for left knee pain. ICD-10-CM ICD-9-CM    1. Primary osteoarthritis of left knee M17.12 715.16 [place] DRAIN/INJECT LARGE JOINT/BURSA      EUFLEXXA INJECTION PER DOSE      sodium hyaluronate (SUPARTZ FX/HYALGAN/GENIVSC) 10 mg/mL syrg injection   2. Left knee pain, unspecified chronicity M25.562 719.46 [place] DRAIN/INJECT LARGE JOINT/BURSA      EUFLEXXA INJECTION PER DOSE      sodium hyaluronate (SUPARTZ FX/HYALGAN/GENIVSC) 10 mg/mL syrg injection   3. Knee effusion, left M25.462 719.06    4. Popliteal cyst, left M71.22 727.51      PROCEDURE:  After discussing treatment options, Ms. Nida Woodward left knee was injected with 2 cc of Euflexxa. Chart reviewed for the following:   Sharifa Elaine MD, have reviewed the History, Physical and updated the Allergic reactions for [address] performed immediately prior to start of procedure:  Sharifa Elaine MD, have performed the following reviews on Susan Low prior to the start of the procedure:            * Patient was identified by name and date of birth   * Agreement on procedure being performed was verified  * Risks and Benefits explained to the patient  * Procedure site verified and marked as necessary  * Patient was positioned for comfort  * Consent was obtained     Time: 11:55 AM     Date of procedure: 5/10/2017    Procedure performed by:  Tanika Guevara MD    Ms. Chang Sandhu tolerated the procedure well with no complications. PLAN:  After discussing treatment options, patient's left knee was injected with 2 cc of Euflexxa.   Ms. Chang Sandhu will follow up in one week to complete her Euflexxa injection series.     Scribed by [facility] ([address] [place] [address] [address]) as dictated by Rock Charlie MD

## 2017-05-10 NOTE — PATIENT INSTRUCTIONS
Knee Arthritis: Exercises  Your Care Instructions  Here are some examples of exercises for knee arthritis. Start each exercise slowly. Ease off the exercise if you start to have pain. Your doctor or physical therapist will tell you when you can start these exercises and which ones will work best for you. How to do the exercises  Knee flexion with heel slide    1. Lie on your back with your knees bent. 2. Slide your heel back by bending your affected knee as far as you can. Then hook your other foot around your ankle to help pull your heel even farther back. 3. Hold for about 6 seconds, then rest for up to 10 seconds. 4. Repeat 8 to 12 times. 5. Switch legs and repeat steps 1 through 4, even if only one knee is sore. Quad sets    1. Sit with your affected leg straight and supported on the floor or a firm bed. Place a small, rolled-up towel under your knee. Your other leg should be bent, with that foot flat on the floor. 2. Tighten the thigh muscles of your affected leg by pressing the back of your knee down into the towel. 3. Hold for about 6 seconds, then rest for up to 10 seconds. 4. Repeat 8 to 12 times. 5. Switch legs and repeat steps 1 through 4, even if only one knee is sore. Straight-leg raises to the front    1. Lie on your back with your good knee bent so that your foot rests flat on the floor. Your affected leg should be straight. Make sure that your low back has a normal curve. You should be able to slip your hand in between the floor and the small of your back, with your palm touching the floor and your back touching the back of your hand. 2. Tighten the thigh muscles in your affected leg by pressing the back of your knee flat down to the floor. Hold your knee straight. 3. Keeping the thigh muscles tight and your leg straight, lift your affected leg up so that your heel is about 12 inches off the floor. Hold for about 6 seconds, then lower slowly.   4. Relax for up to 10 seconds between repetitions. 5. Repeat 8 to 12 times. 6. Switch legs and repeat steps 1 through 5, even if only one knee is sore. Active knee flexion    1. Lie on your stomach with your knees straight. If your kneecap is uncomfortable, roll up a washcloth and put it under your leg just above your kneecap. 2. Lift the foot of your affected leg by bending the knee so that you bring the foot up toward your buttock. If this motion hurts, try it without bending your knee quite as far. This may help you avoid any painful motion. 3. Slowly move your leg up and down. 4. Repeat 8 to 12 times. 5. Switch legs and repeat steps 1 through 4, even if only one knee is sore. Quadriceps stretch (facedown)    1. Lie flat on your stomach, and rest your face on the floor. 2. Wrap a towel or belt strap around the lower part of your affected leg. Then use the towel or belt strap to slowly pull your heel toward your buttock until you feel a stretch. 3. Hold for about 15 to 30 seconds, then relax your leg against the towel or belt strap. 4. Repeat 2 to 4 times. 5. Switch legs and repeat steps 1 through 4, even if only one knee is sore. Stationary exercise bike    If you do not have a stationary exercise bike at home, you can find one to ride at your local health club or community center. 1. Adjust the height of the bike seat so that your knee is slightly bent when your leg is extended downward. If your knee hurts when the pedal reaches the top, you can raise the seat so that your knee does not bend as much. 2. Start slowly. At first, try to do 5 to 10 minutes of cycling with little to no resistance. Then increase your time and the resistance bit by bit until you can do 20 to 30 minutes without pain. 3. If you start to have pain, rest your knee until your pain gets back to the level that is normal for you. Or cycle for less time or with less effort. Follow-up care is a key part of your treatment and safety.  Be sure to make and go to all appointments, and call your doctor if you are having problems. It's also a good idea to know your test results and keep a list of the medicines you take. Where can you learn more? Go to http://mandy-yari.info/. Enter C159 in the search box to learn more about \"Knee Arthritis: Exercises. \"  Current as of: May 23, 2016  Content Version: 11.2  © 2006-2017 [employer]. Care instructions adapted under license by [employer] (which disclaims liability or warranty for this information). If you have questions about a medical condition or this instruction, always ask your healthcare professional. [address] any warranty or liability for your use of this information. Hyaluronic Acid (By injection)   Hyaluronic Acid (bbi-tl-wec-ON-ate AS-id)  Treats severe pain in your knee due to osteoarthritis. Brand Name(s): Euflexxa, Gel-One, GelSyn-3, GenVisc 850, Hyalgan, Hymovis, Monovisc, Orthovisc, Supartz FX   There may be other brand names for this medicine. When This Medicine Should Not Be Used: This medicine is not right for everyone. You should not receive it if you had an allergic reaction to hyaluronic acid or if you have a bleeding disorder. How to Use This Medicine:   Injectable  · Your doctor will tell you how many injections you will need. This medicine is injected into your knee joint. · A nurse or other health provider will give you this medicine. Drugs and Foods to Avoid:      Ask your doctor or pharmacist before using any other medicine, including over-the-counter medicines, vitamins, and herbal products. Warnings While Using This Medicine:   · Tell your doctor if you are pregnant or breastfeeding, or if you have any allergies, including to birds, feathers, or eggs. · Rest your knee for 48 hours after you receive an injection. Do not do strenuous, weightbearing activities, such as jogging or tennis.  Avoid activities that keep you standing for longer than 1 hour. Possible Side Effects While Using This Medicine:   Call your doctor right away if you notice any of these side effects:  · Allergic reaction: Itching or hives, swelling in your face or hands, swelling or tingling in your mouth or throat, chest tightness, trouble breathing  If you notice these less serious side effects, talk with your doctor:   · Mild increase in pain or swelling in your knee  · Pain, redness, or swelling where the medicine is injected  If you notice other side effects that you think are caused by this medicine, tell your doctor. Call your doctor for medical advice about side effects. You may report side effects to FDA at 2-091-FDA-4744  © 2017 [facility] [place] [facility] [place] Information is for End User's use only and may not be sold, redistributed or otherwise used for commercial purposes. The above information is an  only. It is not intended as medical advice for individual conditions or treatments. Talk to your doctor, nurse or pharmacist before following any medical regimen to see if it is safe and effective for you.

## 2017-05-17 ENCOUNTER — OFFICE VISIT (OUTPATIENT)
Dept: ORTHOPEDIC SURGERY | Age: 67
End: 2017-05-17

## 2017-05-17 VITALS
SYSTOLIC BLOOD PRESSURE: 108 MMHG | WEIGHT: 140 LBS | BODY MASS INDEX: 23.9 KG/M2 | HEIGHT: 64 IN | HEART RATE: 84 BPM | TEMPERATURE: 98.1 F | DIASTOLIC BLOOD PRESSURE: 58 MMHG

## 2017-05-17 DIAGNOSIS — M71.22 POPLITEAL CYST, LEFT: ICD-10-CM

## 2017-05-17 DIAGNOSIS — M25.462 KNEE EFFUSION, LEFT: ICD-10-CM

## 2017-05-17 DIAGNOSIS — M25.562 LEFT KNEE PAIN, UNSPECIFIED CHRONICITY: ICD-10-CM

## 2017-05-17 DIAGNOSIS — M17.12 PRIMARY OSTEOARTHRITIS OF LEFT KNEE: Primary | ICD-10-CM

## 2017-05-17 RX ORDER — HYALURONATE SODIUM 10 MG/ML
2 SYRINGE (ML) INTRAARTICULAR ONCE
Qty: 2 ML | Refills: 0
Start: 2017-05-17 | End: 2017-05-17

## 2017-05-17 NOTE — PROGRESS NOTES
Patient: Harvey Velasquez                MRN: [id number]       SSN: [id number]  YOB: 1950        AGE: 77 y.o. SEX: female  There is no height or weight on file to calculate BMI. PCP: Sher Vela MD  05/17/17    No chief complaint on file. HISTORY:  Harvey Velasquez is a 77 y.o. female who is seen for left knee pain. PROCEDURE:  After discussing treatment options, Ms. Gerald Bower left knee was injected with 2 cc of Euflexxa. TIME OUT performed immediately prior to start of procedure:  Nury Newell MD, have performed the following reviews on Harvey Velasquez prior to the start of the procedure:            * Patient was identified by name and date of birth   * Agreement on procedure being performed was verified  * Risks and Benefits explained to the patient  * Procedure site verified and marked as necessary  * Patient was positioned for comfort  * Consent was obtained     Time: 11:25 AM     Date of procedure: 5/17/2017    Procedure performed by:  Jessee Dakin, MD    Ms. Hima Peterson tolerated the procedure well with no complications      DAN-31-TR ICD-9-CM    1. Primary osteoarthritis of left knee M17.12 715.16 MO DRAIN/INJECT LARGE JOINT/BURSA      EUFLEXXA INJECTION PER DOSE      sodium hyaluronate (SUPARTZ FX/HYALGAN/GENIVSC) 10 mg/mL syrg injection   2. Left knee pain, unspecified chronicity M25.562 719.46 MO DRAIN/INJECT LARGE JOINT/BURSA      EUFLEXXA INJECTION PER DOSE      sodium hyaluronate (SUPARTZ FX/HYALGAN/GENIVSC) 10 mg/mL syrg injection   3. Popliteal cyst, left M71.22 727.51    4. Knee effusion, left M25.462 719.06      PLAN:  After discussing treatment options, patient's left knee was injected with 2 cc of Euflexxa. Ms. Hima Peterson will follow up PRN now that she has completed her Euflexxa injection series.      Scribed by [employer] ([address]) as dictated by Jessee Dakin, MD

## 2017-05-17 NOTE — PATIENT INSTRUCTIONS
Knee Arthritis: Exercises  Your Care Instructions  Here are some examples of exercises for knee arthritis. Start each exercise slowly. Ease off the exercise if you start to have pain. Your doctor or physical therapist will tell you when you can start these exercises and which ones will work best for you. How to do the exercises  Knee flexion with heel slide    1. Lie on your back with your knees bent. 2. Slide your heel back by bending your affected knee as far as you can. Then hook your other foot around your ankle to help pull your heel even farther back. 3. Hold for about 6 seconds, then rest for up to 10 seconds. 4. Repeat 8 to 12 times. 5. Switch legs and repeat steps 1 through 4, even if only one knee is sore. Quad sets    1. Sit with your affected leg straight and supported on the floor or a firm bed. Place a small, rolled-up towel under your knee. Your other leg should be bent, with that foot flat on the floor. 2. Tighten the thigh muscles of your affected leg by pressing the back of your knee down into the towel. 3. Hold for about 6 seconds, then rest for up to 10 seconds. 4. Repeat 8 to 12 times. 5. Switch legs and repeat steps 1 through 4, even if only one knee is sore. Straight-leg raises to the front    1. Lie on your back with your good knee bent so that your foot rests flat on the floor. Your affected leg should be straight. Make sure that your low back has a normal curve. You should be able to slip your hand in between the floor and the small of your back, with your palm touching the floor and your back touching the back of your hand. 2. Tighten the thigh muscles in your affected leg by pressing the back of your knee flat down to the floor. Hold your knee straight. 3. Keeping the thigh muscles tight and your leg straight, lift your affected leg up so that your heel is about 12 inches off the floor. Hold for about 6 seconds, then lower slowly.   4. Relax for up to 10 seconds between repetitions. 5. Repeat 8 to 12 times. 6. Switch legs and repeat steps 1 through 5, even if only one knee is sore. Active knee flexion    1. Lie on your stomach with your knees straight. If your kneecap is uncomfortable, roll up a washcloth and put it under your leg just above your kneecap. 2. Lift the foot of your affected leg by bending the knee so that you bring the foot up toward your buttock. If this motion hurts, try it without bending your knee quite as far. This may help you avoid any painful motion. 3. Slowly move your leg up and down. 4. Repeat 8 to 12 times. 5. Switch legs and repeat steps 1 through 4, even if only one knee is sore. Quadriceps stretch (facedown)    1. Lie flat on your stomach, and rest your face on the floor. 2. Wrap a towel or belt strap around the lower part of your affected leg. Then use the towel or belt strap to slowly pull your heel toward your buttock until you feel a stretch. 3. Hold for about 15 to 30 seconds, then relax your leg against the towel or belt strap. 4. Repeat 2 to 4 times. 5. Switch legs and repeat steps 1 through 4, even if only one knee is sore. Stationary exercise bike    If you do not have a stationary exercise bike at home, you can find one to ride at your local health club or community center. 1. Adjust the height of the bike seat so that your knee is slightly bent when your leg is extended downward. If your knee hurts when the pedal reaches the top, you can raise the seat so that your knee does not bend as much. 2. Start slowly. At first, try to do 5 to 10 minutes of cycling with little to no resistance. Then increase your time and the resistance bit by bit until you can do 20 to 30 minutes without pain. 3. If you start to have pain, rest your knee until your pain gets back to the level that is normal for you. Or cycle for less time or with less effort. Follow-up care is a key part of your treatment and safety.  Be sure to make and go to all appointments, and call your doctor if you are having problems. It's also a good idea to know your test results and keep a list of the medicines you take. Where can you learn more? Go to http://mandy-yari.info/. Enter C159 in the search box to learn more about \"Knee Arthritis: Exercises. \"  Current as of: May 23, 2016  Content Version: 11.2  © 2006-2017 [employer]. Care instructions adapted under license by [employer] (which disclaims liability or warranty for this information). If you have questions about a medical condition or this instruction, always ask your healthcare professional. [address] any warranty or liability for your use of this information. Hyaluronic Acid (By injection)   Hyaluronic Acid (qnp-jx-gam-ON-ate AS-id)  Treats severe pain in your knee due to osteoarthritis. Brand Name(s): Euflexxa, Gel-One, GelSyn-3, GenVisc 850, Hyalgan, Hymovis, Monovisc, Orthovisc, Supartz FX   There may be other brand names for this medicine. When This Medicine Should Not Be Used: This medicine is not right for everyone. You should not receive it if you had an allergic reaction to hyaluronic acid or if you have a bleeding disorder. How to Use This Medicine:   Injectable  · Your doctor will tell you how many injections you will need. This medicine is injected into your knee joint. · A nurse or other health provider will give you this medicine. Drugs and Foods to Avoid:      Ask your doctor or pharmacist before using any other medicine, including over-the-counter medicines, vitamins, and herbal products. Warnings While Using This Medicine:   · Tell your doctor if you are pregnant or breastfeeding, or if you have any allergies, including to birds, feathers, or eggs. · Rest your knee for 48 hours after you receive an injection. Do not do strenuous, weightbearing activities, such as jogging or tennis.  Avoid activities that keep you standing for longer than 1 hour. Possible Side Effects While Using This Medicine:   Call your doctor right away if you notice any of these side effects:  · Allergic reaction: Itching or hives, swelling in your face or hands, swelling or tingling in your mouth or throat, chest tightness, trouble breathing  If you notice these less serious side effects, talk with your doctor:   · Mild increase in pain or swelling in your knee  · Pain, redness, or swelling where the medicine is injected  If you notice other side effects that you think are caused by this medicine, tell your doctor. Call your doctor for medical advice about side effects. You may report side effects to FDA at 5-412-FDA-3563  © 2017 [address] Information is for End User's use only and may not be sold, redistributed or otherwise used for commercial purposes. The above information is an  only. It is not intended as medical advice for individual conditions or treatments. Talk to your doctor, nurse or pharmacist before following any medical regimen to see if it is safe and effective for you.

## 2017-08-18 ENCOUNTER — HOSPITAL ENCOUNTER (OUTPATIENT)
Dept: MAMMOGRAPHY | Age: 67
Discharge: HOME | End: 2017-08-18
Attending: PHYSICIAN ASSISTANT
Payer: MEDICARE

## 2017-08-18 DIAGNOSIS — Z12.31 VISIT FOR SCREENING MAMMOGRAM: ICD-10-CM

## 2017-08-18 PROCEDURE — 77067 SCR MAMMO BI INCL CAD: CPT

## 2017-08-25 ENCOUNTER — OFFICE VISIT (OUTPATIENT)
Dept: ORTHOPEDIC SURGERY | Facility: CLINIC | Age: 67
End: 2017-08-25

## 2017-08-25 VITALS
HEART RATE: 72 BPM | RESPIRATION RATE: 16 BRPM | OXYGEN SATURATION: 97 % | DIASTOLIC BLOOD PRESSURE: 56 MMHG | SYSTOLIC BLOOD PRESSURE: 113 MMHG | BODY MASS INDEX: 23.8 KG/M2 | HEIGHT: 64 IN | WEIGHT: 139.4 LBS | TEMPERATURE: 97.2 F

## 2017-08-25 DIAGNOSIS — M17.12 PRIMARY OSTEOARTHRITIS OF LEFT KNEE: Primary | ICD-10-CM

## 2017-08-25 DIAGNOSIS — M18.12 PRIMARY OSTEOARTHRITIS OF FIRST CARPOMETACARPAL JOINT OF ONE HAND, LEFT: ICD-10-CM

## 2017-08-25 DIAGNOSIS — M71.22 POPLITEAL CYST, LEFT: ICD-10-CM

## 2017-08-25 DIAGNOSIS — M79.642 LEFT HAND PAIN: ICD-10-CM

## 2017-08-25 DIAGNOSIS — M25.562 LEFT KNEE PAIN, UNSPECIFIED CHRONICITY: ICD-10-CM

## 2017-08-25 RX ORDER — BETAMETHASONE SODIUM PHOSPHATE AND BETAMETHASONE ACETATE 3; 3 MG/ML; MG/ML
3 INJECTION, SUSPENSION INTRA-ARTICULAR; INTRALESIONAL; INTRAMUSCULAR; SOFT TISSUE ONCE
Qty: 0.5 ML | Refills: 0
Start: 2017-08-25 | End: 2017-08-25

## 2017-08-25 RX ORDER — BUPIVACAINE HYDROCHLORIDE 2.5 MG/ML
4 INJECTION, SOLUTION EPIDURAL; INFILTRATION; INTRACAUDAL ONCE
Qty: 4 ML | Refills: 0
Start: 2017-08-25 | End: 2017-08-25

## 2017-08-25 RX ORDER — BUPIVACAINE HYDROCHLORIDE 2.5 MG/ML
0.5 INJECTION, SOLUTION EPIDURAL; INFILTRATION; INTRACAUDAL ONCE
Qty: 0.5 ML | Refills: 0
Start: 2017-08-25 | End: 2017-08-25

## 2017-08-25 NOTE — PATIENT INSTRUCTIONS
Knee Arthritis: Exercises  Your Care Instructions  Here are some examples of exercises for knee arthritis. Start each exercise slowly. Ease off the exercise if you start to have pain. Your doctor or physical therapist will tell you when you can start these exercises and which ones will work best for you. How to do the exercises  Knee flexion with heel slide    1. Lie on your back with your knees bent. 2. Slide your heel back by bending your affected knee as far as you can. Then hook your other foot around your ankle to help pull your heel even farther back. 3. Hold for about 6 seconds, then rest for up to 10 seconds. 4. Repeat 8 to 12 times. 5. Switch legs and repeat steps 1 through 4, even if only one knee is sore. Quad sets    1. Sit with your affected leg straight and supported on the floor or a firm bed. Place a small, rolled-up towel under your knee. Your other leg should be bent, with that foot flat on the floor. 2. Tighten the thigh muscles of your affected leg by pressing the back of your knee down into the towel. 3. Hold for about 6 seconds, then rest for up to 10 seconds. 4. Repeat 8 to 12 times. 5. Switch legs and repeat steps 1 through 4, even if only one knee is sore. Straight-leg raises to the front    1. Lie on your back with your good knee bent so that your foot rests flat on the floor. Your affected leg should be straight. Make sure that your low back has a normal curve. You should be able to slip your hand in between the floor and the small of your back, with your palm touching the floor and your back touching the back of your hand. 2. Tighten the thigh muscles in your affected leg by pressing the back of your knee flat down to the floor. Hold your knee straight. 3. Keeping the thigh muscles tight and your leg straight, lift your affected leg up so that your heel is about 12 inches off the floor. Hold for about 6 seconds, then lower slowly.   4. Relax for up to 10 seconds between repetitions. 5. Repeat 8 to 12 times. 6. Switch legs and repeat steps 1 through 5, even if only one knee is sore. Active knee flexion    1. Lie on your stomach with your knees straight. If your kneecap is uncomfortable, roll up a washcloth and put it under your leg just above your kneecap. 2. Lift the foot of your affected leg by bending the knee so that you bring the foot up toward your buttock. If this motion hurts, try it without bending your knee quite as far. This may help you avoid any painful motion. 3. Slowly move your leg up and down. 4. Repeat 8 to 12 times. 5. Switch legs and repeat steps 1 through 4, even if only one knee is sore. Quadriceps stretch (facedown)    1. Lie flat on your stomach, and rest your face on the floor. 2. Wrap a towel or belt strap around the lower part of your affected leg. Then use the towel or belt strap to slowly pull your heel toward your buttock until you feel a stretch. 3. Hold for about 15 to 30 seconds, then relax your leg against the towel or belt strap. 4. Repeat 2 to 4 times. 5. Switch legs and repeat steps 1 through 4, even if only one knee is sore. Stationary exercise bike    If you do not have a stationary exercise bike at home, you can find one to ride at your local health club or community center. 1. Adjust the height of the bike seat so that your knee is slightly bent when your leg is extended downward. If your knee hurts when the pedal reaches the top, you can raise the seat so that your knee does not bend as much. 2. Start slowly. At first, try to do 5 to 10 minutes of cycling with little to no resistance. Then increase your time and the resistance bit by bit until you can do 20 to 30 minutes without pain. 3. If you start to have pain, rest your knee until your pain gets back to the level that is normal for you. Or cycle for less time or with less effort. Follow-up care is a key part of your treatment and safety.  Be sure to make and go to all appointments, and call your doctor if you are having problems. It's also a good idea to know your test results and keep a list of the medicines you take. Where can you learn more? Go to http://mandy-yari.info/. Enter C159 in the search box to learn more about \"Knee Arthritis: Exercises. \"  Current as of: March 21, 2017  Content Version: 11.3  © 2006-2017 [email or web address]. Care instructions adapted under license by [employer] (which disclaims liability or warranty for this information). If you have questions about a medical condition or this instruction, always ask your healthcare professional. [place] [address] any warranty or liability for your use of this information. Joint Injections: Care Instructions  Your Care Instructions  Joint injections are shots into a joint, such as the knee. They may be used to put in medicines, such as pain relievers. Or they can be used to take out fluid. Sometimes the fluid is tested in a lab. This can help find the cause of a joint problem. A corticosteroid, or steroid, shot is used to reduce inflammation in tendons or joints. It is often used to treat problems such as arthritis, tendinitis, and bursitis. Steroids can be injected directly into a painful, inflamed joint. They can also help reduce inflammation of a bursa. A bursa is a sac of fluid. It cushions and lubricates areas where tendons, ligaments, skin, muscles, or bones rub against each other. A steroid shot can sometimes help with short-term pain relief when other treatments haven't worked. If steroid shots help, pain may improve for weeks or months. Follow-up care is a key part of your treatment and safety. Be sure to make and go to all appointments, and call your doctor if you are having problems. It's also a good idea to know your test results and keep a list of the medicines you take. How can you care for yourself at home?   · Put ice or a cold pack on the area for 10 to 20 minutes at a time. Put a thin cloth between the ice and your skin. · Take anti-inflammatory medicines to reduce pain, swelling, or inflammation. These include ibuprofen (Advil, Motrin) and naproxen (Aleve). Read and follow all instructions on the label. · Avoid strenuous activities for several days, especially those that put stress on the area where you got the shot. · If you have dressings over the area, keep them clean and dry. You may remove them when your doctor tells you to. When should you call for help? Call your doctor now or seek immediate medical care if:  · You have signs of infection, such as:  ¨ Increased pain, swelling, warmth, or redness. ¨ Red streaks leading from the site. ¨ Pus draining from the site. ¨ A fever. Watch closely for changes in your health, and be sure to contact your doctor if you have any problems. Where can you learn more? Go to http://mandyShotfarmyari.info/. Enter N616 in the search box to learn more about \"Joint Injections: Care Instructions. \"  Current as of: March 21, 2017  Content Version: 11.3  © 8655-9191 [employer]. Care instructions adapted under license by [employer] (which disclaims liability or warranty for this information). If you have questions about a medical condition or this instruction, always ask your healthcare professional. [address] any warranty or liability for your use of this information. Thumb Arthritis: Exercises  Your Care Instructions  Here are some examples of exercises for thumb arthritis. Start each exercise slowly. Ease off the exercise if you start to have pain. Your doctor or your physical or occupational therapist will tell you when you can start these exercises and which ones will work best for you. How to do the exercises  Thumb IP flexion    6. Place your forearm and hand on a table with your affected thumb pointing up.   7. With your other hand, hold your thumb steady just below the joint nearest your thumbnail. 8. Bend the tip of your thumb downward, then straighten it. 9. Repeat 8 to 12 times. 10. Switch hands and repeat steps 1 through 4, even if only one thumb is sore. Thumb MP flexion    6. Place your forearm and hand on a table with your affected thumb pointing up. 7. With your other hand, hold the base of your thumb and palm steady. 8. Bend your thumb downward where it meets your palm, then straighten it. 9. Repeat 8 to 12 times. 10. Switch hands and repeat steps 1 through 4, even if only one thumb is sore. Thumb opposition    7. With your affected hand, point your fingers and thumb straight up. Your wrist should be relaxed, following the line of your fingers and thumb. 8. Touch your affected thumb to each finger, one finger at a time. This will look like an \"okay\" sign, but try to keep your other fingers straight and pointing upward as much as you can. 9. Repeat 8 to 12 times. 10. Switch hands and repeat steps 1 through 3, even if only one thumb is sore. Follow-up care is a key part of your treatment and safety. Be sure to make and go to all appointments, and call your doctor if you are having problems. It's also a good idea to know your test results and keep a list of the medicines you take. Where can you learn more? Go to http://mandy-yari.info/. Enter P895 in the search box to learn more about \"Thumb Arthritis: Exercises. \"  Current as of: March 21, 2017  Content Version: 11.3  © 1733-1916 [employer]. Care instructions adapted under license by [employer] (which disclaims liability or warranty for this information). If you have questions about a medical condition or this instruction, always ask your healthcare professional. [address] any warranty or liability for your use of this information.

## 2017-08-25 NOTE — PROGRESS NOTES
Patient: Eun Minaya                MRN: [id number]       SSN: [id number]  YOB: 1950        AGE: 79 y.o. SEX: female    PCP: XIOMARA Coto  08/25/17    Chief Complaint   Patient presents with    Finger Pain     left thumb    Knee Pain     left     HISTORY:  Eun Minaya is a 79 y.o. female who is seen for increased left thumb and left knee pain. Pt reports having pain when performing various activities. Pt also has had left knee pain while ambulating at times. She had temporary response to previous left knee and left basal joint cortisone injections. She is experiencing thumb pain with gripping and pinching. She has trouble opening jars. She notes worsened left thumb aching at night. She completed a Euflexxa series on 5/17/17. Pain Assessment  8/25/2017   Location of Pain Finger   Pain Location Comment thumb   Location Modifiers Left   Severity of Pain 7   Quality of Pain Sharp   Quality of Pain Comment -   Duration of Pain A few minutes   Frequency of Pain Other (Comment)   Frequency of Pain Comment with movement   Aggravating Factors Straightening;Bending   Aggravating Factors Comment -   Limiting Behavior Some   Relieving Factors Nothing   Relieving Factors Comment -   Result of Injury No   Work-Related Injury -   Type of Injury -     Occupation, etc:  Ms. Estrellita Simons recently retired on 1/1/17 as a  at [employer] in [place]. Pt no longer works out at [employer]. Current weight is 139 pounds. She is 5'4\" tall. She states her  will be beginning dialysis shortly- he is leaning towards peritoneal dialysis. She is not diabetic or hypertensive.      Weight Metrics 8/25/2017 5/17/2017 5/10/2017 5/3/2017 4/5/2017 1/17/2017 1/11/2017   Weight 139 lb 6.4 oz 140 lb 141 lb 142 lb 9.6 oz 143 lb 138 lb 138 lb   BMI 23.93 kg/m2 24.03 kg/m2 24.2 kg/m2 24.48 kg/m2 24.55 kg/m2 23.69 kg/m2 23.69 kg/m2     Patient Active Problem List Diagnosis Code    Bilateral SI joint pain M25.50    Low back pain M54.5    Weakness R53.1    Tremor R25.1    Neck pain, chronic M54.2, G89.29    Unsteady gait R26.81    Headache R51    Sacroiliac dysfunction M53.3    Lumbar pain M54.5    Bulging lumbar disc M51.26    Facet hypertrophy of lumbar region M47.896    HNP (herniated nucleus pulposus), cervical M50.20    Myofascial pain M79.1    Sacroiliac joint dysfunction of both sides M53.3    Facet arthritis of lumbar region ([facility] [address] M46.96    Spondylosis of cervical region without myelopathy or radiculopathy M47.812     REVIEW OF SYSTEMS: All Below are Negative except: See HPI   Constitutional: negative for fever, chills, and weight loss. Cardiovascular: negative for chest pain, claudication, leg swelling, SOB, MUNSON   Gastrointestinal: Negative for pain, N/V/C/D, Blood in stool or urine, dysuria,  hematuria, incontinence, pelvic pain. Musculoskeletal: See HPI   Neurological: Negative for dizziness and weakness. Negative for headaches, Visual changes, confusion, seizures   Phychiatric/Behavioral: Negative for depression, memory loss, substance  abuse. Extremities: Negative for hair changes, rash, or skin lesion changes. Hematologic: Negative for bleeding problems, bruising, pallor or swollen lymph  nodes   Peripheral Vascular: No calf pain, no circulation deficits.     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 Indications: PAIN    methocarbamol (ROBAXIN) 500 mg tablet Take 0.5-1 Tabs by mouth three (3) times daily as needed. Indications: MUSCLE SPASM    B.infantis-B.ani-B.long-B.bifi 10-15 mg TbEC Take  by mouth.  naproxen (NAPROSYN) 500 mg tablet Take 1 Tab by mouth two (2) times daily (with meals). Indications: OSTEOARTHRITIS    FLUoxetine (PROZAC) 20 mg capsule Take 60 mg by mouth daily.  traZODone (DESYREL) 50 mg tablet Take 100 mg by mouth nightly.  ALPRAZolam (XANAX) 0.5 mg tablet Take  by mouth nightly as needed.  ascorbic acid (VITAMIN C) 500 mg tablet Take 500 mg by mouth daily.  cyanocobalamin (VITAMIN B-12) 500 mcg tablet Take 500 mcg by mouth daily.  omega-3 fatty acids-vitamin e (FISH OIL) 1,000 mg Cap Take 1 Cap by mouth daily.  calcium 600 mg Cap Take  by mouth daily.  aspirin 81 mg tablet Take 81 mg by mouth.  OMEPRAZOLE MAGNESIUM (PRILOSEC OTC PO) Take 40 mg by mouth daily.  HYDROcodone-acetaminophen (NORCO)  mg tablet Take 1 Tab by mouth daily. Max Daily Amount: 1 Tab. Do not fill before 09/30/2016  Indications: PAIN (Patient not taking: Reported on 8/25/2017)    levocetirizine (XYZAL) 5 mg tablet Take 5 mg by mouth. q pm prn    FERROUS SULFATE (SLOW FE PO) Take  by mouth daily. No current facility-administered medications for this visit.        PHYSICAL EXAMINATION:  Visit Vitals    /56 (BP 1 Location: Left arm, BP Patient Position: Sitting)    Pulse 72    Temp 97.2 °F (36.2 °C) (Oral)    Resp 16    Ht 5' 4\" (1.626 m)    Wt 139 lb 6.4 oz (63.2 kg)    SpO2 97%    BMI 23.93 kg/m2      ORTHO EXAMINATION:  Examination Right knee Left knee   Skin Intact Intact   Range of motion 120-0 115-0   Effusion - -   Medial joint line tenderness + +   Lateral joint line tenderness - -   Popliteal tenderness - - Osteophytes palpable - +   Angles - -   Patella crepitus + +   Anterior drawer - -   Lateral laxity - -   Medial laxity - -   Varus deformity - -   Valgus deformity - -   Pretibial edema - -   Calf tenderness - -     Examination Left Hand   Skin Intact   Deformity -   Swelling -   Tenderness +, basal joint   Finger flexion Full   Finger extension Full   Sensation Normal   Capillary refill Normal   Heberden's nodes +   Dupuytren's -   Palpable basal joint osteophytes  Prominence at the base of the left thumb  Positive crank and grind    PROCEDURE:  After discussing treatment options, patient's left knee was injected with 4 cc Marcaine and 1/2 cc Celestone. After discussing treatment options, patient's left basal joint was injected with 1/2 cc Marcaine and 1/2 cc Celestone. Chart reviewed for the following:   Braden Cruz MD, have reviewed the History, Physical and updated the Allergic reactions for [address] performed immediately prior to start of procedure:  Braden Cruz MD, have performed the following reviews on Genita Cheek prior to the start of the procedure:            * Patient was identified by name and date of birth   * Agreement on procedure being performed was verified  * Risks and Benefits explained to the patient  * Procedure site verified and marked as necessary  * Patient was positioned for comfort  * Consent was obtained     Time: 3:09 PM     Date of procedure: 8/25/2017    Procedure performed by:  Chari Schulte MD    Ms. Sofia Weldon tolerated the procedure well with no complications. RADIOGRAPHS:  XR LEFT KNEE 12/30/16  IMPRESSION:  1. Severe degenerative joint disease in medial compartment, with loss of the cartilage and radial tear in the medial meniscus. Edema surrounding the intact MCL. 2. Joint effusion and small popliteal cyst.    IMPRESSION:      ICD-10-CM ICD-9-CM    1.  Primary osteoarthritis of left knee M17.12 715.16 betamethasone (CELESTONE SOLUSPAN) 6 mg/mL injection      BETAMETHASONE ACETATE & SODIUM PHOSPHATE INJECTION 3 MG EA.      DRAIN/INJECT LARGE JOINT/BURSA      bupivacaine, PF, (MARCAINE, PF,) 0.25 % (2.5 mg/mL) injection   2. Left knee pain, unspecified chronicity M25.562 719.46 betamethasone (CELESTONE SOLUSPAN) 6 mg/mL injection      BETAMETHASONE ACETATE & SODIUM PHOSPHATE INJECTION 3 MG EA.      DRAIN/INJECT LARGE JOINT/BURSA      bupivacaine, PF, (MARCAINE, PF,) 0.25 % (2.5 mg/mL) injection   3. Popliteal cyst, left M71.22 727.51 betamethasone (CELESTONE SOLUSPAN) 6 mg/mL injection      BETAMETHASONE ACETATE & SODIUM PHOSPHATE INJECTION 3 MG EA.      DRAIN/INJECT LARGE JOINT/BURSA      bupivacaine, PF, (MARCAINE, PF,) 0.25 % (2.5 mg/mL) injection   4. Primary osteoarthritis of first carpometacarpal joint of one hand, left M18.12 715.14 betamethasone (CELESTONE SOLUSPAN) 6 mg/mL injection      BETAMETHASONE ACETATE & SODIUM PHOSPHATE INJECTION 3 MG EA.      DRAIN/INJECT SMALL JOINT/BURSA      bupivacaine, PF, (MARCAINE, PF,) 0.25 % (2.5 mg/mL) injection   5. Left hand pain M79.642 729.5      PLAN:  After discussing treatment options, patient's left knee was injected with 4 cc Marcaine and 1/2 cc Celestone. After discussing treatment options, patient's left basal joint was injected with 1/2 cc Marcaine and 1/2 cc Celestone. I will see her back as needed. We discussed possible need for left knee arthroplasty potentially unicompartmental at some time in the future. There is no need for left thumb surgery at this time.       Scribed by João Prakash ([facility]) as dictated by Jessica Hannah MD

## 2017-10-13 ENCOUNTER — OFFICE VISIT (OUTPATIENT)
Dept: ORTHOPEDIC SURGERY | Facility: CLINIC | Age: 67
End: 2017-10-13

## 2017-10-13 VITALS
OXYGEN SATURATION: 99 % | SYSTOLIC BLOOD PRESSURE: 106 MMHG | DIASTOLIC BLOOD PRESSURE: 61 MMHG | HEART RATE: 78 BPM | WEIGHT: 139.2 LBS | BODY MASS INDEX: 23.76 KG/M2 | HEIGHT: 64 IN

## 2017-10-13 DIAGNOSIS — M79.642 BILATERAL HAND PAIN: ICD-10-CM

## 2017-10-13 DIAGNOSIS — M17.12 PRIMARY OSTEOARTHRITIS OF LEFT KNEE: ICD-10-CM

## 2017-10-13 DIAGNOSIS — M79.641 BILATERAL HAND PAIN: ICD-10-CM

## 2017-10-13 DIAGNOSIS — M18.0 PRIMARY OSTEOARTHRITIS OF BOTH FIRST CARPOMETACARPAL JOINTS: Primary | ICD-10-CM

## 2017-10-13 DIAGNOSIS — G89.29 CHRONIC PAIN OF LEFT KNEE: ICD-10-CM

## 2017-10-13 DIAGNOSIS — M25.562 CHRONIC PAIN OF LEFT KNEE: ICD-10-CM

## 2017-10-13 RX ORDER — BUPIVACAINE HYDROCHLORIDE 2.5 MG/ML
4 INJECTION, SOLUTION EPIDURAL; INFILTRATION; INTRACAUDAL ONCE
Qty: 4 ML | Refills: 0
Start: 2017-10-13 | End: 2017-10-13

## 2017-10-13 RX ORDER — BETAMETHASONE SODIUM PHOSPHATE AND BETAMETHASONE ACETATE 3; 3 MG/ML; MG/ML
3 INJECTION, SUSPENSION INTRA-ARTICULAR; INTRALESIONAL; INTRAMUSCULAR; SOFT TISSUE ONCE
Qty: 0.5 ML | Refills: 0
Start: 2017-10-13 | End: 2017-10-13

## 2017-10-13 RX ORDER — BUPIVACAINE HYDROCHLORIDE 2.5 MG/ML
0.5 INJECTION, SOLUTION EPIDURAL; INFILTRATION; INTRACAUDAL ONCE
Qty: 0.5 ML | Refills: 0
Start: 2017-10-13 | End: 2017-10-13

## 2017-10-13 NOTE — MR AVS SNAPSHOT
Visit Information Date & Time Provider Department Dept. Phone Encounter #  
 10/13/2017  2:00 PM Brenden Osorio MD [facility] [phone] Follow-up Instructions Return if symptoms worsen or fail to improve. Upcoming Health Maintenance Date Due Hepatitis C Screening 1950 FOBT Q 1 YEAR AGE 50-75 8/24/2000 ZOSTER VACCINE AGE 60> 6/24/2010 GLAUCOMA SCREENING Q2Y 8/24/2015 Pneumococcal 65+ Low/Medium Risk (1 of 2 - PCV13) 8/24/2015 MEDICARE YEARLY EXAM 8/24/2015 INFLUENZA AGE 9 TO ADULT 8/1/2017 BREAST CANCER SCRN MAMMOGRAM 8/18/2019 DTaP/Tdap/Td series (2 - Td) 5/17/2023 Allergies as of 10/13/2017  Review Complete On: 10/13/2017 By: Sarmad Thomas LPN Severity Noted Reaction Type Reactions Topamax [Topiramate]  04/07/2016    Other (comments) Whole body tingling Blurry vision Current Immunizations  Never Reviewed Name Date Tdap 5/17/2013  7:57 PM  
  
 Not reviewed this visit You Were Diagnosed With   
  
 Codes Comments Primary osteoarthritis of both first carpometacarpal joints    -  Primary ICD-10-CM: M18.0 ICD-9-CM: 715.14 Primary osteoarthritis of left knee     ICD-10-CM: M17.12 
ICD-9-CM: 715.16 Chronic pain of left knee     ICD-10-CM: M25.562, G89.29 ICD-9-CM: 719.46, 338.29 Bilateral hand pain     ICD-10-CM: M79.641, [id number] ICD-9-CM: 729.5 Vitals BP Pulse Height(growth percentile) Weight(growth percentile) SpO2 BMI  
 106/61 (BP 1 Location: Left arm, BP Patient Position: Sitting) 78 5' 4\" (1.626 m) 139 lb 3.2 oz (63.1 kg) 99% 23.89 kg/m2 OB Status Smoking Status Postmenopausal Never Smoker BMI and BSA Data Body Mass Index Body Surface Area  
 23.89 kg/m 2 1.69 m 2 Preferred Pharmacy Pharmacy Name [id number] - [address] [phone] Your Updated Medication List  
  
   
This list is accurate as of: 10/13/17  2:45 PM.  Always use your most recent med list.  
  
  
  
  
 aspirin 81 mg tablet Take 81 mg by mouth. B.infantis-B.ani-B.long-B.bifi 10-15 mg Tbec Take  by mouth.  
  
 calcium 600 mg Cap Take  by mouth daily. FISH OIL 1,000 mg Cap Generic drug:  omega-3 fatty acids-vitamin e Take 1 Cap by mouth daily. * HYDROcodone-acetaminophen  mg tablet Commonly known as:  [address] Take 1 Tab by mouth daily. Max Daily Amount: 1 Tab. Do not fill before 09/30/2016  Indications: PAIN  
  
 * HYDROcodone-acetaminophen  mg tablet Commonly known as:  [address] Take 1 Tab by mouth daily as needed for Pain. Indications: PAIN  
  
 methocarbamol 500 mg tablet Commonly known as:  ROBAXIN Take 0.5-1 Tabs by mouth three (3) times daily as needed. Indications: MUSCLE SPASM  
  
 naproxen 500 mg tablet Commonly known as:  NAPROSYN Take 1 Tab by mouth two (2) times daily (with meals). Indications: OSTEOARTHRITIS PRILOSEC OTC PO Take 40 mg by mouth daily. PROzac 20 mg capsule Generic drug:  FLUoxetine Take 60 mg by mouth daily. SLOW FE PO Take  by mouth daily. traZODone 50 mg tablet Commonly known as:  Sharon Ophelia Take 100 mg by mouth nightly. VITAMIN B-12 500 mcg tablet Generic drug:  cyanocobalamin Take 500 mcg by mouth daily. VITAMIN C 500 mg tablet Generic drug:  ascorbic acid (vitamin C) Take 500 mg by mouth daily. XANAX 0.5 mg tablet Generic drug:  ALPRAZolam  
Take  by mouth nightly as needed. XYZAL 5 mg tablet Generic drug:  levocetirizine Take 5 mg by mouth. q pm prn * Notice: This list has 2 medication(s) that are the same as other medications prescribed for you. Read the directions carefully, and ask your doctor or other care provider to review them with you. Follow-up Instructions Return if symptoms worsen or fail to improve. Patient Instructions Knee Arthritis: Exercises Your Care Instructions Here are some examples of exercises for knee arthritis. Start each exercise slowly. Ease off the exercise if you start to have pain. Your doctor or physical therapist will tell you when you can start these exercises and which ones will work best for you. How to do the exercises Knee flexion with heel slide 1. Lie on your back with your knees bent. 2. Slide your heel back by bending your affected knee as far as you can. Then hook your other foot around your ankle to help pull your heel even farther back. 3. Hold for about 6 seconds, then rest for up to 10 seconds. 4. Repeat 8 to 12 times. 5. Switch legs and repeat steps 1 through 4, even if only one knee is sore. [facility] 1. Sit with your affected leg straight and supported on the floor or a firm bed. Place a small, rolled-up towel under your knee. Your other leg should be bent, with that foot flat on the floor. 2. Tighten the thigh muscles of your affected leg by pressing the back of your knee down into the towel. 3. Hold for about 6 seconds, then rest for up to 10 seconds. 4. Repeat 8 to 12 times. 5. Switch legs and repeat steps 1 through 4, even if only one knee is sore. Straight-leg raises to the front 1. Lie on your back with your good knee bent so that your foot rests flat on the floor. Your affected leg should be straight. Make sure that your low back has a normal curve. You should be able to slip your hand in between the floor and the small of your back, with your palm touching the floor and your back touching the back of your hand. 2. Tighten the thigh muscles in your affected leg by pressing the back of your knee flat down to the floor. Hold your knee straight. 3. Keeping the thigh muscles tight and your leg straight, lift your affected leg up so that your heel is about 12 inches off the floor.  Hold for about 6 seconds, then lower slowly. 4. Relax for up to 10 seconds between repetitions. 5. Repeat 8 to 12 times. 6. Switch legs and repeat steps 1 through 5, even if only one knee is sore. Active knee flexion 1. Lie on your stomach with your knees straight. If your kneecap is uncomfortable, roll up a washcloth and put it under your leg just above your kneecap. 2. Lift the foot of your affected leg by bending the knee so that you bring the foot up toward your buttock. If this motion hurts, try it without bending your knee quite as far. This may help you avoid any painful motion. 3. Slowly move your leg up and down. 4. Repeat 8 to 12 times. 5. Switch legs and repeat steps 1 through 4, even if only one knee is sore. Quadriceps stretch (facedown) 1. Lie flat on your stomach, and rest your face on the floor. 2. Wrap a towel or belt strap around the lower part of your affected leg. Then use the towel or belt strap to slowly pull your heel toward your buttock until you feel a stretch. 3. Hold for about 15 to 30 seconds, then relax your leg against the towel or belt strap. 4. Repeat 2 to 4 times. 5. Switch legs and repeat steps 1 through 4, even if only one knee is sore. Stationary exercise bike If you do not have a stationary exercise bike at home, you can find one to ride at your local health club or community center. 1. Adjust the height of the bike seat so that your knee is slightly bent when your leg is extended downward. If your knee hurts when the pedal reaches the top, you can raise the seat so that your knee does not bend as much. 2. Start slowly. At first, try to do 5 to 10 minutes of cycling with little to no resistance. Then increase your time and the resistance bit by bit until you can do 20 to 30 minutes without pain. 3. If you start to have pain, rest your knee until your pain gets back to the level that is normal for you. Or cycle for less time or with less effort. Follow-up care is a key part of your treatment and safety. Be sure to make and go to all appointments, and call your doctor if you are having problems. It's also a good idea to know your test results and keep a list of the medicines you take. Where can you learn more? Go to http://mandy-yari.info/. Enter C159 in the search box to learn more about \"Knee Arthritis: Exercises. \" Current as of: March 21, 2017 Content Version: 11.3 © 4596-2882 [employer]. Care instructions adapted under license by [employer] (which disclaims liability or warranty for this information). If you have questions about a medical condition or this instruction, always ask your healthcare professional. [address] any warranty or liability for your use of this information. Thumb Arthritis: Exercises Your Care Instructions Here are some examples of exercises for thumb arthritis. Start each exercise slowly. Ease off the exercise if you start to have pain. Your doctor or your physical or occupational therapist will tell you when you can start these exercises and which ones will work best for you. How to do the exercises Thumb IP flexion 6. Place your forearm and hand on a table with your affected thumb pointing up. 7. With your other hand, hold your thumb steady just below the joint nearest your thumbnail. 8. Bend the tip of your thumb downward, then straighten it. 9. Repeat 8 to 12 times. 10. Switch hands and repeat steps 1 through 4, even if only one thumb is sore. Thumb MP flexion 6. Place your forearm and hand on a table with your affected thumb pointing up. 7. With your other hand, hold the base of your thumb and palm steady. 8. Bend your thumb downward where it meets your palm, then straighten it. 9. Repeat 8 to 12 times. 10. Switch hands and repeat steps 1 through 4, even if only one thumb is sore. Thumb opposition 7. With your affected hand, point your fingers and thumb straight up. Your wrist should be relaxed, following the line of your fingers and thumb. 8. Touch your affected thumb to each finger, one finger at a time. This will look like an \"okay\" sign, but try to keep your other fingers straight and pointing upward as much as you can. 9. Repeat 8 to 12 times. 10. Switch hands and repeat steps 1 through 3, even if only one thumb is sore. Follow-up care is a key part of your treatment and safety. Be sure to make and go to all appointments, and call your doctor if you are having problems. It's also a good idea to know your test results and keep a list of the medicines you take. Where can you learn more? Go to http://mandy-yari.info/. Enter B476 in the search box to learn more about \"Thumb Arthritis: Exercises. \" Current as of: March 21, 2017 Content Version: 11.3 © 0362-6039 [employer]. Care instructions adapted under license by [employer] (which disclaims liability or warranty for this information). If you have questions about a medical condition or this instruction, always ask your healthcare professional. [address] any warranty or liability for your use of this information. Joint Injections: Care Instructions Your Care Instructions Joint injections are shots into a joint, such as the knee. They may be used to put in medicines, such as pain relievers. Or they can be used to take out fluid. Sometimes the fluid is tested in a lab. This can help find the cause of a joint problem. A corticosteroid, or steroid, shot is used to reduce inflammation in tendons or joints. It is often used to treat problems such as arthritis, tendinitis, and bursitis. Steroids can be injected directly into a painful, inflamed joint. They can also help reduce inflammation of a bursa. A bursa is a sac of fluid.  It cushions and lubricates areas where tendons, ligaments, skin, muscles, or bones rub against each other. A steroid shot can sometimes help with short-term pain relief when other treatments haven't worked. If steroid shots help, pain may improve for weeks or months. Follow-up care is a key part of your treatment and safety. Be sure to make and go to all appointments, and call your doctor if you are having problems. It's also a good idea to know your test results and keep a list of the medicines you take. How can you care for yourself at home? · Put ice or a cold pack on the area for 10 to 20 minutes at a time. Put a thin cloth between the ice and your skin. · Take anti-inflammatory medicines to reduce pain, swelling, or inflammation. These include ibuprofen (Advil, Motrin) and naproxen (Aleve). Read and follow all instructions on the label. · Avoid strenuous activities for several days, especially those that put stress on the area where you got the shot. · If you have dressings over the area, keep them clean and dry. You may remove them when your doctor tells you to. When should you call for help? Call your doctor now or seek immediate medical care if: 
· You have signs of infection, such as: 
¨ Increased pain, swelling, warmth, or redness. ¨ Red streaks leading from the site. ¨ Pus draining from the site. ¨ A fever. Watch closely for changes in your health, and be sure to contact your doctor if you have any problems. Where can you learn more? Go to http://mandy-yari.info/. Enter N616 in the search box to learn more about \"Joint Injections: Care Instructions. \" Current as of: March 21, 2017 Content Version: 11.3 © 5741-2642 [employer]. Care instructions adapted under license by [employer] (which disclaims liability or warranty for this information).  If you have questions about a medical condition or this instruction, always ask your healthcare professional. [place] [address] any warranty or liability for your use of this information. Introducing hospitals & HEALTH SERVICES! Richard Hamilton introduces uma information technology patient portal. Now you can access parts of your medical record, email your doctor's office, and request medication refills online. 1. In your internet browser, go to [email or web address] 2. Click on the First Time User? Click Here link in the Sign In box. You will see the New Member Sign Up page. 3. Enter your uma information technology Access Code exactly as it appears below. You will not need to use this code after youve completed the sign-up process. If you do not sign up before the expiration date, you must request a new code. · uma information technology Access Code: [email or web address] Expires: 10/29/2017 11:58 AM 
 
4. Enter the last four digits of your Social Security Number (xxxx) and Date of Birth (mm/dd/yyyy) as indicated and click Submit. You will be taken to the next sign-up page. 5. Create a uma information technology ID. This will be your uma information technology login ID and cannot be changed, so think of one that is secure and easy to remember. 6. Create a uma information technology password. You can change your password at any time. 7. Enter your Password Reset Question and Answer. This can be used at a later time if you forget your password. 8. Enter your e-mail address. You will receive e-mail notification when new information is available in [address] 9. Click Sign Up. You can now view and download portions of your medical record. 10. Click the Download Summary menu link to download a portable copy of your medical information. If you have questions, please visit the Frequently Asked Questions section of the uma information technology website. Remember, uma information technology is NOT to be used for urgent needs. For medical emergencies, dial 911. Now available from your iPhone and Android! Please provide this summary of care documentation to your next provider. Your primary care clinician is listed as Sandy Aaron. If you have any questions after today's visit, please call [phone].

## 2017-10-13 NOTE — PATIENT INSTRUCTIONS
Knee Arthritis: Exercises  Your Care Instructions  Here are some examples of exercises for knee arthritis. Start each exercise slowly. Ease off the exercise if you start to have pain. Your doctor or physical therapist will tell you when you can start these exercises and which ones will work best for you. How to do the exercises  Knee flexion with heel slide    1. Lie on your back with your knees bent. 2. Slide your heel back by bending your affected knee as far as you can. Then hook your other foot around your ankle to help pull your heel even farther back. 3. Hold for about 6 seconds, then rest for up to 10 seconds. 4. Repeat 8 to 12 times. 5. Switch legs and repeat steps 1 through 4, even if only one knee is sore. Quad sets    1. Sit with your affected leg straight and supported on the floor or a firm bed. Place a small, rolled-up towel under your knee. Your other leg should be bent, with that foot flat on the floor. 2. Tighten the thigh muscles of your affected leg by pressing the back of your knee down into the towel. 3. Hold for about 6 seconds, then rest for up to 10 seconds. 4. Repeat 8 to 12 times. 5. Switch legs and repeat steps 1 through 4, even if only one knee is sore. Straight-leg raises to the front    1. Lie on your back with your good knee bent so that your foot rests flat on the floor. Your affected leg should be straight. Make sure that your low back has a normal curve. You should be able to slip your hand in between the floor and the small of your back, with your palm touching the floor and your back touching the back of your hand. 2. Tighten the thigh muscles in your affected leg by pressing the back of your knee flat down to the floor. Hold your knee straight. 3. Keeping the thigh muscles tight and your leg straight, lift your affected leg up so that your heel is about 12 inches off the floor. Hold for about 6 seconds, then lower slowly.   4. Relax for up to 10 seconds between repetitions. 5. Repeat 8 to 12 times. 6. Switch legs and repeat steps 1 through 5, even if only one knee is sore. Active knee flexion    1. Lie on your stomach with your knees straight. If your kneecap is uncomfortable, roll up a washcloth and put it under your leg just above your kneecap. 2. Lift the foot of your affected leg by bending the knee so that you bring the foot up toward your buttock. If this motion hurts, try it without bending your knee quite as far. This may help you avoid any painful motion. 3. Slowly move your leg up and down. 4. Repeat 8 to 12 times. 5. Switch legs and repeat steps 1 through 4, even if only one knee is sore. Quadriceps stretch (facedown)    1. Lie flat on your stomach, and rest your face on the floor. 2. Wrap a towel or belt strap around the lower part of your affected leg. Then use the towel or belt strap to slowly pull your heel toward your buttock until you feel a stretch. 3. Hold for about 15 to 30 seconds, then relax your leg against the towel or belt strap. 4. Repeat 2 to 4 times. 5. Switch legs and repeat steps 1 through 4, even if only one knee is sore. Stationary exercise bike    If you do not have a stationary exercise bike at home, you can find one to ride at your local health club or community center. 1. Adjust the height of the bike seat so that your knee is slightly bent when your leg is extended downward. If your knee hurts when the pedal reaches the top, you can raise the seat so that your knee does not bend as much. 2. Start slowly. At first, try to do 5 to 10 minutes of cycling with little to no resistance. Then increase your time and the resistance bit by bit until you can do 20 to 30 minutes without pain. 3. If you start to have pain, rest your knee until your pain gets back to the level that is normal for you. Or cycle for less time or with less effort. Follow-up care is a key part of your treatment and safety.  Be sure to make and go to all appointments, and call your doctor if you are having problems. It's also a good idea to know your test results and keep a list of the medicines you take. Where can you learn more? Go to http://mandy-yari.info/. Enter C159 in the search box to learn more about \"Knee Arthritis: Exercises. \"  Current as of: March 21, 2017  Content Version: 11.3  © 2006-2017 [employer]. Care instructions adapted under license by [employer] (which disclaims liability or warranty for this information). If you have questions about a medical condition or this instruction, always ask your healthcare professional. [place] [address] any warranty or liability for your use of this information. Thumb Arthritis: Exercises  Your Care Instructions  Here are some examples of exercises for thumb arthritis. Start each exercise slowly. Ease off the exercise if you start to have pain. Your doctor or your physical or occupational therapist will tell you when you can start these exercises and which ones will work best for you. How to do the exercises  Thumb IP flexion    6. Place your forearm and hand on a table with your affected thumb pointing up. 7. With your other hand, hold your thumb steady just below the joint nearest your thumbnail. 8. Bend the tip of your thumb downward, then straighten it. 9. Repeat 8 to 12 times. 10. Switch hands and repeat steps 1 through 4, even if only one thumb is sore. Thumb MP flexion    6. Place your forearm and hand on a table with your affected thumb pointing up. 7. With your other hand, hold the base of your thumb and palm steady. 8. Bend your thumb downward where it meets your palm, then straighten it. 9. Repeat 8 to 12 times. 10. Switch hands and repeat steps 1 through 4, even if only one thumb is sore. Thumb opposition    7. With your affected hand, point your fingers and thumb straight up.  Your wrist should be relaxed, following the line of your fingers and thumb. 8. Touch your affected thumb to each finger, one finger at a time. This will look like an \"okay\" sign, but try to keep your other fingers straight and pointing upward as much as you can. 9. Repeat 8 to 12 times. 10. Switch hands and repeat steps 1 through 3, even if only one thumb is sore. Follow-up care is a key part of your treatment and safety. Be sure to make and go to all appointments, and call your doctor if you are having problems. It's also a good idea to know your test results and keep a list of the medicines you take. Where can you learn more? Go to http://mandy-yari.info/. Enter R898 in the search box to learn more about \"Thumb Arthritis: Exercises. \"  Current as of: March 21, 2017  Content Version: 11.3  © 5054-6022 [employer]. Care instructions adapted under license by [employer] (which disclaims liability or warranty for this information). If you have questions about a medical condition or this instruction, always ask your healthcare professional. [address] any warranty or liability for your use of this information. Joint Injections: Care Instructions  Your Care Instructions  Joint injections are shots into a joint, such as the knee. They may be used to put in medicines, such as pain relievers. Or they can be used to take out fluid. Sometimes the fluid is tested in a lab. This can help find the cause of a joint problem. A corticosteroid, or steroid, shot is used to reduce inflammation in tendons or joints. It is often used to treat problems such as arthritis, tendinitis, and bursitis. Steroids can be injected directly into a painful, inflamed joint. They can also help reduce inflammation of a bursa. A bursa is a sac of fluid. It cushions and lubricates areas where tendons, ligaments, skin, muscles, or bones rub against each other.   A steroid shot can sometimes help with short-term pain relief when other treatments haven't worked. If steroid shots help, pain may improve for weeks or months. Follow-up care is a key part of your treatment and safety. Be sure to make and go to all appointments, and call your doctor if you are having problems. It's also a good idea to know your test results and keep a list of the medicines you take. How can you care for yourself at home? · Put ice or a cold pack on the area for 10 to 20 minutes at a time. Put a thin cloth between the ice and your skin. · Take anti-inflammatory medicines to reduce pain, swelling, or inflammation. These include ibuprofen (Advil, Motrin) and naproxen (Aleve). Read and follow all instructions on the label. · Avoid strenuous activities for several days, especially those that put stress on the area where you got the shot. · If you have dressings over the area, keep them clean and dry. You may remove them when your doctor tells you to. When should you call for help? Call your doctor now or seek immediate medical care if:  · You have signs of infection, such as:  ¨ Increased pain, swelling, warmth, or redness. ¨ Red streaks leading from the site. ¨ Pus draining from the site. ¨ A fever. Watch closely for changes in your health, and be sure to contact your doctor if you have any problems. Where can you learn more? Go to http://mandy-yari.info/. Enter N616 in the search box to learn more about \"Joint Injections: Care Instructions. \"  Current as of: March 21, 2017  Content Version: 11.3  © 9500-7011 [employer]. Care instructions adapted under license by [employer] (which disclaims liability or warranty for this information). If you have questions about a medical condition or this instruction, always ask your healthcare professional. [address] any warranty or liability for your use of this information.

## 2017-10-13 NOTE — PROGRESS NOTES
Patient: Ayden Knapp                MRN: [id number]       SSN: [id number]  YOB: 1950        AGE: 79 y.o. SEX: female    PCP: XIOMARA Palacios  10/13/17    Chief Complaint   Patient presents with    Finger Pain     right thumb     HISTORY:  Ayden Knapp is a 79 y.o. female who is seen for increased bilateral thumb and left knee pain. Pt reports having pain when performing various activities. Pt also has had left knee pain while ambulating at times. She had temporary response to previous left knee and left basal joint cortisone injections. She is experiencing thumb pain with gripping and pinching. She has trouble opening jars. She notes worsened left thumb aching at night. She completed a Euflexxa series on 5/17/17. She reports increased left knee pain after working in the yard. She notes medial left knee pain. Pain Assessment  10/13/2017   Location of Pain (No Data)   Pain Location Comment thumb   Location Modifiers Right   Severity of Pain 3   Quality of Pain Sharp   Quality of Pain Comment -   Duration of Pain A few minutes   Frequency of Pain Several times daily   Frequency of Pain Comment -   Aggravating Factors -   Aggravating Factors Comment -   Limiting Behavior Some   Relieving Factors Rest   Relieving Factors Comment -   Result of Injury No   Work-Related Injury -   Type of Injury -     Occupation, etc:  Ms. Larry Navarro recently retired on 1/1/17 as a  at [employer] in [place]. Pt no longer works out at [facility] [employer]. Current weight is 139 pounds. She is 5'4\" tall. She states her  will be beginning dialysis shortly- he is leaning towards peritoneal dialysis. She is not diabetic or hypertensive.      Weight Metrics 10/13/2017 8/25/2017 5/17/2017 5/10/2017 5/3/2017 4/5/2017 1/17/2017   Weight 139 lb 3.2 oz 139 lb 6.4 oz 140 lb 141 lb 142 lb 9.6 oz 143 lb 138 lb   BMI 23.89 kg/m2 23.93 kg/m2 24.03 kg/m2 24.2 kg/m2 24.48 kg/m2 24.55 kg/m2 23.69 kg/m2     Patient Active Problem List   Diagnosis Code    Bilateral SI joint pain M25.50    Low back pain M54.5    Weakness R53.1    Tremor R25.1    Neck pain, chronic M54.2, G89.29    Unsteady gait R26.81    Headache(784.0) R51    Sacroiliac dysfunction M53.3    Lumbar pain M54.5    Bulging lumbar disc M51.26    Facet hypertrophy of lumbar region M47.896    HNP (herniated nucleus pulposus), cervical M50.20    Myofascial pain M79.1    Sacroiliac joint dysfunction of both sides M53.3    Facet arthritis of lumbar region ([address] M46.96    Spondylosis of cervical region without myelopathy or radiculopathy M47.812     REVIEW OF SYSTEMS: All Below are Negative except: See HPI   Constitutional: negative for fever, chills, and weight loss. Cardiovascular: negative for chest pain, claudication, leg swelling, SOB, MUNSON   Gastrointestinal: Negative for pain, N/V/C/D, Blood in stool or urine, dysuria,  hematuria, incontinence, pelvic pain. Musculoskeletal: See HPI   Neurological: Negative for dizziness and weakness. Negative for headaches, Visual changes, confusion, seizures   Phychiatric/Behavioral: Negative for depression, memory loss, substance  abuse. Extremities: Negative for hair changes, rash, or skin lesion changes. Hematologic: Negative for bleeding problems, bruising, pallor or swollen lymph  nodes   Peripheral Vascular: No calf pain, no circulation deficits.     Social History     Social History    Marital status:      Spouse name: N/A    Number of children: N/A    Years of education: N/A     Occupational History    teacher's asst      full time     Social History Main Topics    Smoking status: Never Smoker    Smokeless tobacco: Never Used    Alcohol use No    Drug use: No    Sexual activity: Not on file      Comment: BTL     Other Topics Concern    Not on file     Social History Narrative      Allergies   Allergen Reactions    Topamax [Topiramate] Other (comments)     Whole body tingling  Blurry vision      Current Outpatient Prescriptions   Medication Sig    methocarbamol (ROBAXIN) 500 mg tablet Take 0.5-1 Tabs by mouth three (3) times daily as needed. Indications: MUSCLE SPASM    B.infantis-B.ani-B.long-B.bifi 10-15 mg TbEC Take  by mouth.  naproxen (NAPROSYN) 500 mg tablet Take 1 Tab by mouth two (2) times daily (with meals). Indications: OSTEOARTHRITIS    levocetirizine (XYZAL) 5 mg tablet Take 5 mg by mouth. q pm prn    FLUoxetine (PROZAC) 20 mg capsule Take 60 mg by mouth daily.  traZODone (DESYREL) 50 mg tablet Take 100 mg by mouth nightly.  ALPRAZolam (XANAX) 0.5 mg tablet Take  by mouth nightly as needed.  ascorbic acid (VITAMIN C) 500 mg tablet Take 500 mg by mouth daily.  cyanocobalamin (VITAMIN B-12) 500 mcg tablet Take 500 mcg by mouth daily.  omega-3 fatty acids-vitamin e (FISH OIL) 1,000 mg Cap Take 1 Cap by mouth daily.  calcium 600 mg Cap Take  by mouth daily.  aspirin 81 mg tablet Take 81 mg by mouth.  OMEPRAZOLE MAGNESIUM (PRILOSEC OTC PO) Take 40 mg by mouth daily.  HYDROcodone-acetaminophen (NORCO)  mg tablet Take 1 Tab by mouth daily as needed for Pain. Indications: PAIN    HYDROcodone-acetaminophen (NORCO)  mg tablet Take 1 Tab by mouth daily. Max Daily Amount: 1 Tab. Do not fill before 09/30/2016  Indications: PAIN    FERROUS SULFATE (SLOW FE PO) Take  by mouth daily. No current facility-administered medications for this visit.        PHYSICAL EXAMINATION:  Visit Vitals    /61 (BP 1 Location: Left arm, BP Patient Position: Sitting)    Pulse 78    Ht 5' 4\" (1.626 m)    Wt 139 lb 3.2 oz (63.1 kg)    SpO2 99%    BMI 23.89 kg/m2      ORTHO EXAMINATION:  Examination Right knee Left knee   Skin Intact Intact   Range of motion 120-0 115-0   Effusion - -   Medial joint line tenderness + +   Lateral joint line tenderness - -   Popliteal tenderness - -   Osteophytes palpable - + Angles - -   Patella crepitus + +   Anterior drawer - -   Lateral laxity - -   Medial laxity - -   Varus deformity - -   Valgus deformity - -   Pretibial edema - -   Calf tenderness - -     Examination Right Hand Left Hand   Skin Intact Intact   Deformity - -   Swelling - -   Tenderness + basal joint + basal joint    Finger flexion Full Full   Finger extension Full Full   Sensation Normal Normal   Capillary refill Normal Normal   Heberden's nodes + +   Dupuytren's - -   Palpable basal joint osteophytes  Prominence at the base of the left thumb  Positive crank and grind  Thenar atrophy    PROCEDURE:  After discussing treatment options, patient's left knee was injected with 4 cc Marcaine and 1/2 cc Celestone. After discussing treatment options, patient's basal joints were injected with 1/2 cc Marcaine and 1/2 cc Celestone. Chart reviewed for the following:   Frank Dotson MD, have reviewed the History, Physical and updated the Allergic reactions for [address] performed immediately prior to start of procedure:  Frank Dotson MD, have performed the following reviews on [facility] prior to the start of the procedure:            * Patient was identified by name and date of birth   * Agreement on procedure being performed was verified  * Risks and Benefits explained to the patient  * Procedure site verified and marked as necessary  * Patient was positioned for comfort  * Consent was obtained     Time: 2:39 PM     Date of procedure: 10/13/2017    Procedure performed by:  Lesly Wood MD    Ms. Zandra López tolerated the procedure well with no complications. RADIOGRAPHS:  XR LEFT KNEE 12/30/16  IMPRESSION:  1. Severe degenerative joint disease in medial compartment, with loss of the cartilage and radial tear in the medial meniscus. Edema surrounding the intact MCL. 2. Joint effusion and small popliteal cyst.    IMPRESSION:      ICD-10-CM ICD-9-CM    1.  Primary osteoarthritis of both first carpometacarpal joints M18.0 715.14 betamethasone (CELESTONE SOLUSPAN) 6 mg/mL injection      BETAMETHASONE ACETATE & SODIUM PHOSPHATE INJECTION 3 MG EA.      DRAIN/INJECT SMALL JOINT/BURSA      bupivacaine, PF, (MARCAINE, PF,) 0.25 % (2.5 mg/mL) injection      THUMB SPLINT   2. Primary osteoarthritis of left knee M17.12 715.16 betamethasone (CELESTONE SOLUSPAN) 6 mg/mL injection      BETAMETHASONE ACETATE & SODIUM PHOSPHATE INJECTION 3 MG EA.      DRAIN/INJECT LARGE JOINT/BURSA      bupivacaine, PF, (MARCAINE, PF,) 0.25 % (2.5 mg/mL) injection   3. Chronic pain of left knee M25.562 719.46 betamethasone (CELESTONE SOLUSPAN) 6 mg/mL injection    G89.29 338.29 BETAMETHASONE ACETATE & SODIUM PHOSPHATE INJECTION 3 MG EA.      DRAIN/INJECT LARGE JOINT/BURSA      bupivacaine, PF, (MARCAINE, PF,) 0.25 % (2.5 mg/mL) injection   4. Bilateral hand pain M79.641 729.5 betamethasone (CELESTONE SOLUSPAN) 6 mg/mL injection    M79.642  BETAMETHASONE ACETATE & SODIUM PHOSPHATE INJECTION 3 MG EA.      DRAIN/INJECT SMALL JOINT/BURSA      bupivacaine, PF, (MARCAINE, PF,) 0.25 % (2.5 mg/mL) injection     PLAN:  After discussing treatment options, patient's left knee was injected with 4 cc Marcaine and 1/2 cc Celestone. After discussing treatment options, patient's basal joints were injected with 1/2 cc Marcaine and 1/2 cc Celestone. I will see her back as needed. We discussed possible need for left knee arthroplasty potentially unicompartmental at some time in the future. There is no need for left thumb surgery at this time. She was provided with a left mini thumb spica today.      Scribed by Charbel Corey ([address]) as dictated by Kimberley Veliz MD

## 2017-11-21 ENCOUNTER — OFFICE VISIT (OUTPATIENT)
Dept: ORTHOPEDIC SURGERY | Facility: CLINIC | Age: 67
End: 2017-11-21

## 2017-11-21 VITALS
DIASTOLIC BLOOD PRESSURE: 67 MMHG | TEMPERATURE: 97.7 F | OXYGEN SATURATION: 98 % | WEIGHT: 139 LBS | SYSTOLIC BLOOD PRESSURE: 120 MMHG | BODY MASS INDEX: 23.73 KG/M2 | HEART RATE: 78 BPM | HEIGHT: 64 IN

## 2017-11-21 DIAGNOSIS — G89.29 CHRONIC PAIN OF LEFT KNEE: ICD-10-CM

## 2017-11-21 DIAGNOSIS — M79.642 LEFT HAND PAIN: ICD-10-CM

## 2017-11-21 DIAGNOSIS — M18.12 PRIMARY OSTEOARTHRITIS OF FIRST CARPOMETACARPAL JOINT OF ONE HAND, LEFT: ICD-10-CM

## 2017-11-21 DIAGNOSIS — M17.12 PRIMARY OSTEOARTHRITIS OF LEFT KNEE: Primary | ICD-10-CM

## 2017-11-21 DIAGNOSIS — M25.562 CHRONIC PAIN OF LEFT KNEE: ICD-10-CM

## 2017-11-21 DIAGNOSIS — M71.22 POPLITEAL CYST, LEFT: ICD-10-CM

## 2017-11-21 RX ORDER — BUPIVACAINE HYDROCHLORIDE 2.5 MG/ML
0.5 INJECTION, SOLUTION EPIDURAL; INFILTRATION; INTRACAUDAL ONCE
Qty: 0.5 ML | Refills: 0
Start: 2017-11-21 | End: 2017-11-21

## 2017-11-21 RX ORDER — BETAMETHASONE SODIUM PHOSPHATE AND BETAMETHASONE ACETATE 3; 3 MG/ML; MG/ML
6 INJECTION, SUSPENSION INTRA-ARTICULAR; INTRALESIONAL; INTRAMUSCULAR; SOFT TISSUE ONCE
Qty: 0.5 ML | Refills: 0
Start: 2017-11-21 | End: 2017-11-21

## 2017-11-21 RX ORDER — BUPIVACAINE HYDROCHLORIDE 2.5 MG/ML
4 INJECTION, SOLUTION EPIDURAL; INFILTRATION; INTRACAUDAL ONCE
Qty: 4 ML | Refills: 0
Start: 2017-11-21 | End: 2017-11-21

## 2017-11-21 NOTE — PROGRESS NOTES
Patient: Pb Fierro                MRN: [id number]       SSN: [id number]  YOB: 1950        AGE: 79 y.o. SEX: female    PCP: XIOMARA Matos  11/21/17    Chief Complaint   Patient presents with    Knee Pain     left     HISTORY:  Pb Fierro is a 79 y.o. female who is seen for increased bilateral thumb L>R and left knee pain. Pt reports having pain when performing various activities. Pt also has had left knee pain while ambulating at times. She had temporary response to previous left knee and left basal joint cortisone injections. She is experiencing thumb pain with gripping and pinching. She has trouble opening jars. She notes worsened left thumb aching at night. She completed a Euflexxa series on 5/17/17. She reports increased left knee pain after working in the yard. She notes medial left knee pain. She notes pain with standing and walking. Pain Assessment  11/21/2017   Location of Pain Knee   Pain Location Comment -   Location Modifiers Left   Severity of Pain 10   Quality of Pain Sharp   Quality of Pain Comment -   Duration of Pain A few minutes   Frequency of Pain -   Frequency of Pain Comment -   Aggravating Factors Walking   Aggravating Factors Comment -   Limiting Behavior Some   Relieving Factors Rest   Relieving Factors Comment -   Result of Injury No   Work-Related Injury -   Type of Injury -     Occupation, etc:  Ms. Talib Marlow recently retired on 1/1/17 as a  at [employer] in [place]. Pt no longer works out at [employer]. Current weight is 139 pounds. She is 5'4\" tall. She states her  will be beginning dialysis shortly- he is leaning toward peritoneal dialysis. He reports her  was recently diagnosed with CHF so she does not feel ready to consider surgery. She is not diabetic or hypertensive.      Weight Metrics 11/21/2017 10/13/2017 8/25/2017 5/17/2017 5/10/2017 5/3/2017 4/5/2017   Weight 139 lb 139 lb 3.2 oz 139 lb 6.4 oz 140 lb 141 lb 142 lb 9.6 oz 143 lb   BMI 23.86 kg/m2 23.89 kg/m2 23.93 kg/m2 24.03 kg/m2 24.2 kg/m2 24.48 kg/m2 24.55 kg/m2     Patient Active Problem List   Diagnosis Code    Bilateral SI joint pain M25.50    Low back pain M54.5    Weakness R53.1    Tremor R25.1    Neck pain, chronic M54.2, G89.29    Unsteady gait R26.81    Headache(784.0) R51    Sacroiliac dysfunction M53.3    Lumbar pain M54.5    Bulging lumbar disc M51.26    Facet hypertrophy of lumbar region M47.896    HNP (herniated nucleus pulposus), cervical M50.20    Myofascial pain M79.1    Sacroiliac joint dysfunction of both sides M53.3    Facet arthritis of lumbar region ([facility] [address] M46.96    Spondylosis of cervical region without myelopathy or radiculopathy M47.812     REVIEW OF SYSTEMS: All Below are Negative except: See HPI   Constitutional: negative for fever, chills, and weight loss. Cardiovascular: negative for chest pain, claudication, leg swelling, SOB, MUNSON   Gastrointestinal: Negative for pain, N/V/C/D, Blood in stool or urine, dysuria,  hematuria, incontinence, pelvic pain. Musculoskeletal: See HPI   Neurological: Negative for dizziness and weakness. Negative for headaches, Visual changes, confusion, seizures   Phychiatric/Behavioral: Negative for depression, memory loss, substance  abuse. Extremities: Negative for hair changes, rash, or skin lesion changes. Hematologic: Negative for bleeding problems, bruising, pallor or swollen lymph  nodes   Peripheral Vascular: No calf pain, no circulation deficits.     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 Indications: MUSCLE SPASM    B.infantis-B.ani-B.long-B.bifi 10-15 mg TbEC Take  by mouth.  naproxen (NAPROSYN) 500 mg tablet Take 1 Tab by mouth two (2) times daily (with meals). Indications: OSTEOARTHRITIS    levocetirizine (XYZAL) 5 mg tablet Take 5 mg by mouth. q pm prn    FLUoxetine (PROZAC) 20 mg capsule Take 60 mg by mouth daily.  traZODone (DESYREL) 50 mg tablet Take 100 mg by mouth nightly.  ALPRAZolam (XANAX) 0.5 mg tablet Take  by mouth nightly as needed.  ascorbic acid (VITAMIN C) 500 mg tablet Take 500 mg by mouth daily.  cyanocobalamin (VITAMIN B-12) 500 mcg tablet Take 500 mcg by mouth daily.  omega-3 fatty acids-vitamin e (FISH OIL) 1,000 mg Cap Take 1 Cap by mouth daily.  calcium 600 mg Cap Take  by mouth daily.  aspirin 81 mg tablet Take 81 mg by mouth.  FERROUS SULFATE (SLOW FE PO) Take  by mouth daily.  OMEPRAZOLE MAGNESIUM (PRILOSEC OTC PO) Take 40 mg by mouth daily.  HYDROcodone-acetaminophen (NORCO)  mg tablet Take 1 Tab by mouth daily as needed for Pain. Indications: PAIN    HYDROcodone-acetaminophen (NORCO)  mg tablet Take 1 Tab by mouth daily. Max Daily Amount: 1 Tab. Do not fill before 09/30/2016  Indications: PAIN     No current facility-administered medications for this visit.        PHYSICAL EXAMINATION:  Visit Vitals    /67    Pulse 78    Temp 97.7 °F (36.5 °C) (Oral)    Ht 5' 4\" (1.626 m)    Wt 139 lb (63 kg)    SpO2 98%    BMI 23.86 kg/m2      ORTHO EXAMINATION:  Examination Right knee Left knee   Skin Intact Intact   Range of motion 120-0 115-0   Effusion - -   Medial joint line tenderness + +   Lateral joint line tenderness - -   Popliteal tenderness - -   Osteophytes palpable - +   Angles - -   Patella crepitus + +   Anterior drawer - -   Lateral laxity - -   Medial laxity - -   Varus deformity - -   Valgus deformity - -   Pretibial edema - -   Calf tenderness - -     Examination Right Hand Left Hand   Skin Intact Intact   Deformity - -   Swelling - -   Tenderness + basal joint + basal joint    Finger flexion Full Full   Finger extension Full Full   Sensation Normal Normal   Capillary refill Normal Normal   Heberden's nodes + +   Dupuytren's - -   Palpable basal joint osteophytes  Prominence at the base of the left thumb  Positive crank and grind  Thenar atrophy    PROCEDURE:  After discussing treatment options, patient's left knee was injected with 4 cc Marcaine and 1/2 cc Celestone. After discussing treatment options, patient's left basal joint was injected with 1/2 cc Marcaine and 1/2 cc Celestone. Chart reviewed for the following:   Tommi Schilder, MD, have reviewed the History, Physical and updated the Allergic reactions for [address] performed immediately prior to start of procedure:  Tommi Schilder, MD, have performed the following reviews on [facility] prior to the start of the procedure:            * Patient was identified by name and date of birth   * Agreement on procedure being performed was verified  * Risks and Benefits explained to the patient  * Procedure site verified and marked as necessary  * Patient was positioned for comfort  * Consent was obtained     Time: 4:02 PM     Date of procedure: 11/21/2017    Procedure performed by:  Demarcus Burgos MD    Ms. Rebecca Hinton tolerated the procedure well with no complications. RADIOGRAPHS:  XR LEFT KNEE 12/30/16  IMPRESSION:  1. Severe degenerative joint disease in medial compartment, with loss of the cartilage and radial tear in the medial meniscus. Edema surrounding the intact MCL.   2. Joint effusion and small popliteal cyst.    IMPRESSION:      ICD-10-CM ICD-9-CM    1. Primary osteoarthritis of left knee M17.12 715.16 betamethasone (CELESTONE SOLUSPAN) 6 mg/mL injection      BETAMETHASONE ACETATE & SODIUM PHOSPHATE INJECTION 3 MG EA.      DRAIN/INJECT LARGE JOINT/BURSA      bupivacaine, PF, (MARCAINE, PF,) 0.25 % (2.5 mg/mL) injection      PROCEDURE AUTHORIZATION TO    2. Chronic pain of left knee M25.562 719.46 betamethasone (CELESTONE SOLUSPAN) 6 mg/mL injection    G89.29 338.29 BETAMETHASONE ACETATE & SODIUM PHOSPHATE INJECTION 3 MG EA.      DRAIN/INJECT LARGE JOINT/BURSA      bupivacaine, PF, (MARCAINE, PF,) 0.25 % (2.5 mg/mL) injection      PROCEDURE AUTHORIZATION TO    3. Popliteal cyst, left M71.22 727.51 betamethasone (CELESTONE SOLUSPAN) 6 mg/mL injection      BETAMETHASONE ACETATE & SODIUM PHOSPHATE INJECTION 3 MG EA.      DRAIN/INJECT LARGE JOINT/BURSA      bupivacaine, PF, (MARCAINE, PF,) 0.25 % (2.5 mg/mL) injection      PROCEDURE AUTHORIZATION TO    4. Primary osteoarthritis of first carpometacarpal joint of one hand, left M18.12 715.14 betamethasone (CELESTONE SOLUSPAN) 6 mg/mL injection      BETAMETHASONE ACETATE & SODIUM PHOSPHATE INJECTION 3 MG EA.      DRAIN/INJECT SMALL JOINT/BURSA      bupivacaine, PF, (MARCAINE, PF,) 0.25 % (2.5 mg/mL) injection   5. Left hand pain M79.642 729.5 betamethasone (CELESTONE SOLUSPAN) 6 mg/mL injection      BETAMETHASONE ACETATE & SODIUM PHOSPHATE INJECTION 3 MG EA.      DRAIN/INJECT SMALL JOINT/BURSA      bupivacaine, PF, (MARCAINE, PF,) 0.25 % (2.5 mg/mL) injection     PLAN:  After discussing treatment options, patient's left knee was injected with 4 cc Marcaine and 1/2 cc Celestone. After discussing treatment options, patient's left basal joint was injected with 1/2 cc Marcaine and 1/2 cc Celestone. I will see her back as needed. Consider visco supplementation if pain continues.  We discussed possible need for left knee arthroplasty potentially unicompartmental at some time in the future. There is no need for left thumb surgery at this time.        Scribed by Carlos A Peguero ([address]) as dictated by Alina Caldwell MD

## 2017-11-21 NOTE — PATIENT INSTRUCTIONS
Knee Arthritis: Exercises  Your Care Instructions  Here are some examples of exercises for knee arthritis. Start each exercise slowly. Ease off the exercise if you start to have pain. Your doctor or physical therapist will tell you when you can start these exercises and which ones will work best for you. How to do the exercises  Knee flexion with heel slide    1. Lie on your back with your knees bent. 2. Slide your heel back by bending your affected knee as far as you can. Then hook your other foot around your ankle to help pull your heel even farther back. 3. Hold for about 6 seconds, then rest for up to 10 seconds. 4. Repeat 8 to 12 times. 5. Switch legs and repeat steps 1 through 4, even if only one knee is sore. Quad sets    1. Sit with your affected leg straight and supported on the floor or a firm bed. Place a small, rolled-up towel under your knee. Your other leg should be bent, with that foot flat on the floor. 2. Tighten the thigh muscles of your affected leg by pressing the back of your knee down into the towel. 3. Hold for about 6 seconds, then rest for up to 10 seconds. 4. Repeat 8 to 12 times. 5. Switch legs and repeat steps 1 through 4, even if only one knee is sore. Straight-leg raises to the front    1. Lie on your back with your good knee bent so that your foot rests flat on the floor. Your affected leg should be straight. Make sure that your low back has a normal curve. You should be able to slip your hand in between the floor and the small of your back, with your palm touching the floor and your back touching the back of your hand. 2. Tighten the thigh muscles in your affected leg by pressing the back of your knee flat down to the floor. Hold your knee straight. 3. Keeping the thigh muscles tight and your leg straight, lift your affected leg up so that your heel is about 12 inches off the floor. Hold for about 6 seconds, then lower slowly.   4. Relax for up to 10 seconds between repetitions. 5. Repeat 8 to 12 times. 6. Switch legs and repeat steps 1 through 5, even if only one knee is sore. Active knee flexion    1. Lie on your stomach with your knees straight. If your kneecap is uncomfortable, roll up a washcloth and put it under your leg just above your kneecap. 2. Lift the foot of your affected leg by bending the knee so that you bring the foot up toward your buttock. If this motion hurts, try it without bending your knee quite as far. This may help you avoid any painful motion. 3. Slowly move your leg up and down. 4. Repeat 8 to 12 times. 5. Switch legs and repeat steps 1 through 4, even if only one knee is sore. Quadriceps stretch (facedown)    1. Lie flat on your stomach, and rest your face on the floor. 2. Wrap a towel or belt strap around the lower part of your affected leg. Then use the towel or belt strap to slowly pull your heel toward your buttock until you feel a stretch. 3. Hold for about 15 to 30 seconds, then relax your leg against the towel or belt strap. 4. Repeat 2 to 4 times. 5. Switch legs and repeat steps 1 through 4, even if only one knee is sore. Stationary exercise bike    1. If you do not have a stationary exercise bike at home, you can find one to ride at your local health club or community center. 2. Adjust the height of the bike seat so that your knee is slightly bent when your leg is extended downward. If your knee hurts when the pedal reaches the top, you can raise the seat so that your knee does not bend as much. 3. Start slowly. At first, try to do 5 to 10 minutes of cycling with little to no resistance. Then increase your time and the resistance bit by bit until you can do 20 to 30 minutes without pain. 4. If you start to have pain, rest your knee until your pain gets back to the level that is normal for you. Or cycle for less time or with less effort. Follow-up care is a key part of your treatment and safety.  Be sure to make and go to all appointments, and call your doctor if you are having problems. It's also a good idea to know your test results and keep a list of the medicines you take. Where can you learn more? Go to http://mandy-yari.info/. Enter C159 in the search box to learn more about \"Knee Arthritis: Exercises. \"  Current as of: March 21, 2017  Content Version: 11.4  © 2006-2017 [employer]. Care instructions adapted under license by [employer] (which disclaims liability or warranty for this information). If you have questions about a medical condition or this instruction, always ask your healthcare professional. [place] [address] any warranty or liability for your use of this information. Joint Injections: Care Instructions  Your Care Instructions  Joint injections are shots into a joint, such as the knee. They may be used to put in medicines, such as pain relievers. Or they can be used to take out fluid. Sometimes the fluid is tested in a lab. This can help find the cause of a joint problem. A corticosteroid, or steroid, shot is used to reduce inflammation in tendons or joints. It is often used to treat problems such as arthritis, tendinitis, and bursitis. Steroids can be injected directly into a painful, inflamed joint. They can also help reduce inflammation of a bursa. A bursa is a sac of fluid. It cushions and lubricates areas where tendons, ligaments, skin, muscles, or bones rub against each other. A steroid shot can sometimes help with short-term pain relief when other treatments haven't worked. If steroid shots help, pain may improve for weeks or months. Follow-up care is a key part of your treatment and safety. Be sure to make and go to all appointments, and call your doctor if you are having problems. It's also a good idea to know your test results and keep a list of the medicines you take. How can you care for yourself at home?   · Put ice or a cold pack on the area for 10 to 20 minutes at a time. Put a thin cloth between the ice and your skin. · Take anti-inflammatory medicines to reduce pain, swelling, or inflammation. These include ibuprofen (Advil, Motrin) and naproxen (Aleve). Read and follow all instructions on the label. · Avoid strenuous activities for several days, especially those that put stress on the area where you got the shot. · If you have dressings over the area, keep them clean and dry. You may remove them when your doctor tells you to. When should you call for help? Call your doctor now or seek immediate medical care if:  ? · You have signs of infection, such as:  ¨ Increased pain, swelling, warmth, or redness. ¨ Red streaks leading from the site. ¨ Pus draining from the site. ¨ A fever. ? Watch closely for changes in your health, and be sure to contact your doctor if you have any problems. Where can you learn more? Go to http://mandy-yari.info/. Enter N616 in the search box to learn more about \"Joint Injections: Care Instructions. \"  Current as of: March 21, 2017  Content Version: 11.4  © 1975-5862 [employer]. Care instructions adapted under license by [employer] (which disclaims liability or warranty for this information). If you have questions about a medical condition or this instruction, always ask your healthcare professional. [place] [address] any warranty or liability for your use of this information.

## 2017-11-21 NOTE — MR AVS SNAPSHOT
Visit Information Date & Time Provider Department Dept. Phone Encounter #  
 11/21/2017  3:20 PM Sergio Dorado, [address] [facility] - [place] [id number] [phone] Follow-up Instructions Return in about 4 weeks (around 12/19/2017). Upcoming Health Maintenance Date Due Hepatitis C Screening 1950 FOBT Q 1 YEAR AGE 50-75 8/24/2000 ZOSTER VACCINE AGE 60> 6/24/2010 GLAUCOMA SCREENING Q2Y 8/24/2015 Pneumococcal 65+ Low/Medium Risk (1 of 2 - PCV13) 8/24/2015 MEDICARE YEARLY EXAM 8/24/2015 Influenza Age 5 to Adult 8/1/2017 BREAST CANCER SCRN MAMMOGRAM 8/18/2019 DTaP/Tdap/Td series (2 - Td) 5/17/2023 Allergies as of 11/21/2017  Review Complete On: 11/21/2017 By: Sergio Dorado MD  
  
 Severity Noted Reaction Type Reactions Topamax [Topiramate]  04/07/2016    Other (comments) Whole body tingling Blurry vision Current Immunizations  Never Reviewed Name Date Tdap 5/17/2013  7:57 PM  
  
 Not reviewed this visit You Were Diagnosed With   
  
 Codes Comments Primary osteoarthritis of left knee    -  Primary ICD-10-CM: M17.12 
ICD-9-CM: 715.16 Chronic pain of left knee     ICD-10-CM: M25.562, G89.29 ICD-9-CM: 719.46, 338.29 Popliteal cyst, left     ICD-10-CM: M71.22 
ICD-9-CM: 727.51 Primary osteoarthritis of first carpometacarpal joint of one hand, left     ICD-10-CM: M18.12 
ICD-9-CM: 715.14 Left hand pain     ICD-10-CM: W87.919 ICD-9-CM: 729.5 Vitals BP Pulse Temp Height(growth percentile) Weight(growth percentile) SpO2  
 120/67 78 97.7 °F (36.5 °C) (Oral) 5' 4\" (1.626 m) 139 lb (63 kg) 98% BMI OB Status Smoking Status 23.86 kg/m2 Postmenopausal Never Smoker BMI and BSA Data Body Mass Index Body Surface Area  
 23.86 kg/m 2 1.69 m 2 Preferred Pharmacy Pharm[facility] Name Phone [id number] [phone] Your Updated Medication List  
  
   
This list is accurate as of: 11/21/17  4:08 PM.  Always use your most recent med list.  
  
  
  
  
 aspirin 81 mg tablet Take 81 mg by mouth. B.infantis-B.ani-B.long-B.bifi 10-15 mg Tbec Take  by mouth. * betamethasone 6 mg/mL injection Commonly known as:  CELESTONE SOLUSPAN  
1 mL by Intra artICUlar route once for 1 dose. * betamethasone 6 mg/mL injection Commonly known as:  CELESTONE SOLUSPAN  
1 mL by Intra artICUlar route once for 1 dose. * bupivacaine (PF) 0.25 % (2.5 mg/mL) injection Commonly known as:  MARCAINE (PF)  
4 mL by Intra artICUlar route once for 1 dose. * bupivacaine (PF) 0.25 % (2.5 mg/mL) injection Commonly known as:  MARCAINE (PF)  
0.5 mL by Intra artICUlar route once for 1 dose. calcium 600 mg Cap Take  by mouth daily. FISH OIL 1,000 mg Cap Generic drug:  omega-3 fatty acids-vitamin e Take 1 Cap by mouth daily. * HYDROcodone-acetaminophen  mg tablet Commonly known as:  Iven Allen Take 1 Tab by mouth daily. Max Daily Amount: 1 Tab. Do not fill before 09/30/2016  Indications: PAIN  
  
 * HYDROcodone-acetaminophen  mg tablet Commonly known as:  Iven Allen Take 1 Tab by mouth daily as needed for Pain. Indications: PAIN  
  
 methocarbamol 500 mg tablet Commonly known as:  ROBAXIN Take 0.5-1 Tabs by mouth three (3) times daily as needed. Indications: MUSCLE SPASM  
  
 naproxen 500 mg tablet Commonly known as:  NAPROSYN Take 1 Tab by mouth two (2) times daily (with meals). Indications: OSTEOARTHRITIS PRILOSEC OTC PO Take 40 mg by mouth daily. PROzac 20 mg capsule Generic drug:  FLUoxetine Take 60 mg by mouth daily. SLOW FE PO Take  by mouth daily. traZODone 50 mg tablet Commonly known as:  Jann Handy Take 100 mg by mouth nightly. VITAMIN B-12 500 mcg tablet Generic drug:  cyanocobalamin Take 500 mcg by mouth daily. VITAMIN C 500 mg tablet Generic drug:  ascorbic acid (vitamin C) Take 500 mg by mouth daily. XANAX 0.5 mg tablet Generic drug:  ALPRAZolam  
Take  by mouth nightly as needed. XYZAL 5 mg tablet Generic drug:  levocetirizine Take 5 mg by mouth. q pm prn * Notice: This list has 6 medication(s) that are the same as other medications prescribed for you. Read the directions carefully, and ask your doctor or other care provider to review them with you. We Performed the Following BETAMETHASONE ACETATE & SODIUM PHOSPHATE INJECTION 3 MG EA. [ [facility]] BETAMETHASONE ACETATE & SODIUM PHOSPHATE INJECTION 3 MG EA. [ [facility]] DRAIN/INJECT LARGE JOINT/BURSA [id number] CPT(R)] DRAIN/INJECT SMALL JOINT/BURSA [id number] CPT(R)] PROCEDURE AUTHORIZATION TO  [GED4639 Custom] Comments:  
 Need authorization for Euflexxa left Follow-up Instructions Return in about 4 weeks (around 12/19/2017). Patient Instructions Knee Arthritis: Exercises Your Care Instructions Here are some examples of exercises for knee arthritis. Start each exercise slowly. Ease off the exercise if you start to have pain. Your doctor or physical therapist will tell you when you can start these exercises and which ones will work best for you. How to do the exercises Knee flexion with heel slide 1. Lie on your back with your knees bent. 2. Slide your heel back by bending your affected knee as far as you can. Then hook your other foot around your ankle to help pull your heel even farther back. 3. Hold for about 6 seconds, then rest for up to 10 seconds. 4. Repeat 8 to 12 times. 5. Switch legs and repeat steps 1 through 4, even if only one knee is sore. "[employer] 1. Sit with your affected leg straight and supported on the floor or a firm bed. Place a small, rolled-up towel under your knee.  Your other leg should be bent, with that foot flat on the floor. 2. Tighten the thigh muscles of your affected leg by pressing the back of your knee down into the towel. 3. Hold for about 6 seconds, then rest for up to 10 seconds. 4. Repeat 8 to 12 times. 5. Switch legs and repeat steps 1 through 4, even if only one knee is sore. Straight-leg raises to the front 1. Lie on your back with your good knee bent so that your foot rests flat on the floor. Your affected leg should be straight. Make sure that your low back has a normal curve. You should be able to slip your hand in between the floor and the small of your back, with your palm touching the floor and your back touching the back of your hand. 2. Tighten the thigh muscles in your affected leg by pressing the back of your knee flat down to the floor. Hold your knee straight. 3. Keeping the thigh muscles tight and your leg straight, lift your affected leg up so that your heel is about 12 inches off the floor. Hold for about 6 seconds, then lower slowly. 4. Relax for up to 10 seconds between repetitions. 5. Repeat 8 to 12 times. 6. Switch legs and repeat steps 1 through 5, even if only one knee is sore. Active knee flexion 1. Lie on your stomach with your knees straight. If your kneecap is uncomfortable, roll up a washcloth and put it under your leg just above your kneecap. 2. Lift the foot of your affected leg by bending the knee so that you bring the foot up toward your buttock. If this motion hurts, try it without bending your knee quite as far. This may help you avoid any painful motion. 3. Slowly move your leg up and down. 4. Repeat 8 to 12 times. 5. Switch legs and repeat steps 1 through 4, even if only one knee is sore. Quadriceps stretch (facedown) 1. Lie flat on your stomach, and rest your face on the floor. 2. Wrap a towel or belt strap around the lower part of your affected leg. Then use the towel or belt strap to slowly pull your heel toward your buttock until you feel a stretch. 3. Hold for about 15 to 30 seconds, then relax your leg against the towel or belt strap. 4. Repeat 2 to 4 times. 5. Switch legs and repeat steps 1 through 4, even if only one knee is sore. Stationary exercise bike 1. If you do not have a stationary exercise bike at home, you can find one to ride at your local health club or community center. 2. Adjust the height of the bike seat so that your knee is slightly bent when your leg is extended downward. If your knee hurts when the pedal reaches the top, you can raise the seat so that your knee does not bend as much. 3. Start slowly. At first, try to do 5 to 10 minutes of cycling with little to no resistance. Then increase your time and the resistance bit by bit until you can do 20 to 30 minutes without pain. 4. If you start to have pain, rest your knee until your pain gets back to the level that is normal for you. Or cycle for less time or with less effort. Follow-up care is a key part of your treatment and safety. Be sure to make and go to all appointments, and call your doctor if you are having problems. It's also a good idea to know your test results and keep a list of the medicines you take. Where can you learn more? Go to http://mandy-yari.info/. Enter C159 in the search box to learn more about \"Knee Arthritis: Exercises. \" Current as of: March 21, 2017 Content Version: 11.4 © 4920-0844 depict. Care instructions adapted under license by [employer] (which disclaims liability or warranty for this information). If you have questions about a medical condition or this instruction, always ask your healthcare professional. [address] any warranty or liability for your use of this information. Joint Injections: Care Instructions Your Care Instructions Joint injections are shots into a joint, such as the knee. They may be used to put in medicines, such as pain relievers. Or they can be used to take out fluid. Sometimes the fluid is tested in a lab. This can help find the cause of a joint problem. A corticosteroid, or steroid, shot is used to reduce inflammation in tendons or joints. It is often used to treat problems such as arthritis, tendinitis, and bursitis. Steroids can be injected directly into a painful, inflamed joint. They can also help reduce inflammation of a bursa. A bursa is a sac of fluid. It cushions and lubricates areas where tendons, ligaments, skin, muscles, or bones rub against each other. A steroid shot can sometimes help with short-term pain relief when other treatments haven't worked. If steroid shots help, pain may improve for weeks or months. Follow-up care is a key part of your treatment and safety. Be sure to make and go to all appointments, and call your doctor if you are having problems. It's also a good idea to know your test results and keep a list of the medicines you take. How can you care for yourself at home? · Put ice or a cold pack on the area for 10 to 20 minutes at a time. Put a thin cloth between the ice and your skin. · Take anti-inflammatory medicines to reduce pain, swelling, or inflammation. These include ibuprofen (Advil, Motrin) and naproxen (Aleve). Read and follow all instructions on the label. · Avoid strenuous activities for several days, especially those that put stress on the area where you got the shot. · If you have dressings over the area, keep them clean and dry. You may remove them when your doctor tells you to. When should you call for help? Call your doctor now or seek immediate medical care if: 
? · You have signs of infection, such as: 
¨ Increased pain, swelling, warmth, or redness. ¨ Red streaks leading from the site. ¨ Pus draining from the site. ¨ A fever. ?Watch closely for changes in your health, and be sure to contact your doctor if you have any problems. Where can you learn more? Go to http://mandy-yari.info/. Enter N616 in the search box to learn more about \"Joint Injections: Care Instructions. \" Current as of: March 21, 2017 Content Version: 11.4 © 8955-0260 [employer]. Care instructions adapted under license by [employer] (which disclaims liability or warranty for this information). If you have questions about a medical condition or this instruction, always ask your healthcare professional. [address] any warranty or liability for your use of this information. Introducing \[facility]\"" & HEALTH SERVICES! Efrain Alexander introduces [employer] patient portal. Now you can access parts of your medical record, email your doctor's office, and request medication refills online. 1. In your internet browser, go to [email or web address] 2. Click on the First Time User? Click Here link in the Sign In box. You will see the New Member Sign Up page. 3. Enter your [employer] Access Code exactly as it appears below. You will not need to use this code after youve completed the sign-up process. If you do not sign up before the expiration date, you must request a new code. · [employer] Access Code: [email or web address] Expires: 2/19/2018  4:08 PM 
 
4. Enter the last four digits of your Social Security Number (xxxx) and Date of Birth (mm/dd/yyyy) as indicated and click Submit. You will be taken to the next sign-up page. 5. Create a Clever[employer]izet ID. This will be your [employer] login ID and cannot be changed, so think of one that is secure and easy to remember. 6. Create a [employer] password. You can change your password at any time. 7. Enter your Password Reset Question and Answer. This can be used at a later time if you forget your password. 8. Enter your e-mail address. You will receive e-mail notification when new information is available in [address] 9. Click Sign Up. You can now view and download portions of your medical record. 10. Click the Download Summary menu link to download a portable copy of your medical information. If you have questions, please visit the Frequently Asked Questions section of the [employer] website. Remember, [employer] is NOT to be used for urgent needs. For medical emergencies, dial 911. Now available from your iPhone and Android! Please provide this summary of care documentation to your next provider. Your primary care clinician is listed as Ritika Leary. If you have any questions after today's visit, please call [phone].

## 2018-01-19 ENCOUNTER — OFFICE VISIT (OUTPATIENT)
Dept: ORTHOPEDIC SURGERY | Facility: CLINIC | Age: 68
End: 2018-01-19

## 2018-01-19 DIAGNOSIS — M71.22 POPLITEAL CYST, LEFT: ICD-10-CM

## 2018-01-19 DIAGNOSIS — G89.29 CHRONIC PAIN OF LEFT KNEE: ICD-10-CM

## 2018-01-19 DIAGNOSIS — M17.12 PRIMARY OSTEOARTHRITIS OF LEFT KNEE: Primary | ICD-10-CM

## 2018-01-19 DIAGNOSIS — M25.562 CHRONIC PAIN OF LEFT KNEE: ICD-10-CM

## 2018-01-19 RX ORDER — HYALURONATE SODIUM 10 MG/ML
2 SYRINGE (ML) INTRAARTICULAR ONCE
Qty: 2 ML | Refills: 0
Start: 2018-01-19 | End: 2018-01-19

## 2018-01-19 NOTE — PROGRESS NOTES
Patient: Doug Goodwin                MRN: [id number]       SSN: [id number]  YOB: 1950        AGE: 79 y.o. SEX: female    PCP: XIOMARA Zhao  01/19/18    No chief complaint on file. HISTORY:  Doug Goodwin is a 79 y.o. female who is seen for increased bilateral thumb L>R and left knee pain. Pt reports having pain when performing various activities. Pt also has had left knee pain while ambulating at times. She had temporary response to previous left knee and left basal joint cortisone injections. She is experiencing thumb pain with gripping and pinching. She has trouble opening jars. She notes worsened left thumb aching at night. She completed a Euflexxa series on 5/17/17. She reports increased left knee pain after working in the yard. She notes medial left knee pain. She notes pain with standing and walking. Pain Assessment  11/21/2017   Location of Pain Knee   Pain Location Comment -   Location Modifiers Left   Severity of Pain 10   Quality of Pain Sharp   Quality of Pain Comment -   Duration of Pain A few minutes   Frequency of Pain -   Frequency of Pain Comment -   Aggravating Factors Walking   Aggravating Factors Comment -   Limiting Behavior Some   Relieving Factors Rest   Relieving Factors Comment -   Result of Injury No   Work-Related Injury -   Type of Injury -     Occupation, etc:  Ms. Rod Dodge recently retired on 1/1/17 as a  at [employer] in [place]. Pt no longer works out at [facility] [employer]. Current weight is 139 pounds. She is 5'4\" tall. She states her  will be beginning dialysis shortly- he is leaning toward peritoneal dialysis. He reports her  was recently diagnosed with CHF so she does not feel ready to consider surgery. She is not diabetic or hypertensive.      Weight Metrics 11/21/2017 10/13/2017 8/25/2017 5/17/2017 5/10/2017 5/3/2017 4/5/2017   Weight 139 lb 139 lb 3.2 oz 139 lb 6.4 oz 140 lb 141 lb 142 lb 9.6 oz 143 lb   BMI 23.86 kg/m2 23.89 kg/m2 23.93 kg/m2 24.03 kg/m2 24.2 kg/m2 24.48 kg/m2 24.55 kg/m2     Patient Active Problem List   Diagnosis Code    Bilateral SI joint pain M25.50    Low back pain M54.5    Weakness R53.1    Tremor R25.1    Neck pain, chronic M54.2, G89.29    Unsteady gait R26.81    Headache(784.0) R51    Sacroiliac dysfunction M53.3    Lumbar pain M54.5    Bulging lumbar disc M51.26    Facet hypertrophy of lumbar region M47.896    HNP (herniated nucleus pulposus), cervical M50.20    Myofascial pain M79.1    Sacroiliac joint dysfunction of both sides M53.3    Facet arthritis of lumbar region ([facility] [address] M46.96    Spondylosis of cervical region without myelopathy or radiculopathy M47.812     REVIEW OF SYSTEMS: All Below are Negative except: See HPI   Constitutional: negative for fever, chills, and weight loss. Cardiovascular: negative for chest pain, claudication, leg swelling, SOB, MUNSON   Gastrointestinal: Negative for pain, N/V/C/D, Blood in stool or urine, dysuria,  hematuria, incontinence, pelvic pain. Musculoskeletal: See HPI   Neurological: Negative for dizziness and weakness. Negative for headaches, Visual changes, confusion, seizures   Phychiatric/Behavioral: Negative for depression, memory loss, substance  abuse. Extremities: Negative for hair changes, rash, or skin lesion changes. Hematologic: Negative for bleeding problems, bruising, pallor or swollen lymph  nodes   Peripheral Vascular: No calf pain, no circulation deficits.     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 Indications: PAIN    methocarbamol (ROBAXIN) 500 mg tablet Take 0.5-1 Tabs by mouth three (3) times daily as needed. Indications: MUSCLE SPASM    HYDROcodone-acetaminophen (NORCO)  mg tablet Take 1 Tab by mouth daily. Max Daily Amount: 1 Tab. Do not fill before 09/30/2016  Indications: PAIN    B.infantis-B.ani-B.long-B.bifi 10-15 mg TbEC Take  by mouth.  naproxen (NAPROSYN) 500 mg tablet Take 1 Tab by mouth two (2) times daily (with meals). Indications: OSTEOARTHRITIS    levocetirizine (XYZAL) 5 mg tablet Take 5 mg by mouth. q pm prn    FLUoxetine (PROZAC) 20 mg capsule Take 60 mg by mouth daily.  traZODone (DESYREL) 50 mg tablet Take 100 mg by mouth nightly.  ALPRAZolam (XANAX) 0.5 mg tablet Take  by mouth nightly as needed.  ascorbic acid (VITAMIN C) 500 mg tablet Take 500 mg by mouth daily.  cyanocobalamin (VITAMIN B-12) 500 mcg tablet Take 500 mcg by mouth daily.  omega-3 fatty acids-vitamin e (FISH OIL) 1,000 mg Cap Take 1 Cap by mouth daily.  calcium 600 mg Cap Take  by mouth daily.  aspirin 81 mg tablet Take 81 mg by mouth.  FERROUS SULFATE (SLOW FE PO) Take  by mouth daily.  OMEPRAZOLE MAGNESIUM (PRILOSEC OTC PO) Take 40 mg by mouth daily. No current facility-administered medications for this visit. PHYSICAL EXAMINATION:  There were no vitals taken for this visit.    ORTHO EXAMINATION:  Examination Right knee Left knee   Skin Intact Intact   Range of motion 120-0 115-0   Effusion - -   Medial joint line tenderness + +   Lateral joint line tenderness - -   Popliteal tenderness - -   Osteophytes palpable - +   Angles - -   Patella crepitus + +   Anterior drawer - - Lateral laxity - -   Medial laxity - -   Varus deformity - -   Valgus deformity - -   Pretibial edema - -   Calf tenderness - -     Examination Right Hand Left Hand   Skin Intact Intact   Deformity - -   Swelling - -   Tenderness + basal joint + basal joint    Finger flexion Full Full   Finger extension Full Full   Sensation Normal Normal   Capillary refill Normal Normal   Heberden's nodes + +   Dupuytren's - -   Palpable basal joint osteophytes  Prominence at the base of the left thumb  Positive crank and grind  Thenar atrophy    PROCEDURE:  After discussing treatment options, patient's left knee was injected with 2 cc of Euflexxa. After discussing treatment options, patient's left basal joint was injected with 1/2 cc Marcaine and 1/2 cc Celestone. Chart reviewed for the following:   Roberto Abarca MD, have reviewed the History, Physical and updated the Allergic reactions for [address] performed immediately prior to start of procedure:  Roberto Abarca MD, have performed the following reviews on Francisco Maldonadocher prior to the start of the procedure:            * Patient was identified by name and date of birth   * Agreement on procedure being performed was verified  * Risks and Benefits explained to the patient  * Procedure site verified and marked as necessary  * Patient was positioned for comfort  * Consent was obtained     Time: 1:*** PM     Date of procedure: 1/19/2018    Procedure performed by:  Dilshad Olmstead MD    Ms. Abdalla Patient tolerated the procedure well with no complications. RADIOGRAPHS:  XR LEFT KNEE 12/30/16  IMPRESSION:  1. Severe degenerative joint disease in medial compartment, with loss of the cartilage and radial tear in the medial meniscus. Edema surrounding the intact MCL. 2. Joint effusion and small popliteal cyst.    IMPRESSION:      ICD-10-CM ICD-9-CM    1.  Primary osteoarthritis of left knee M17.12 715.16 OH DRAIN/INJECT LARGE JOINT/BURSA EUFLEXXA INJECTION PER DOSE      sodium hyaluronate (SUPARTZ FX/HYALGAN/GENIVSC) 10 mg/mL syrg injection   2. Chronic pain of left knee M25.562 719.46 SC DRAIN/INJECT LARGE JOINT/BURSA    G89.29 338.29 EUFLEXXA INJECTION PER DOSE      sodium hyaluronate (SUPARTZ FX/HYALGAN/GENIVSC) 10 mg/mL syrg injection   3. Popliteal cyst, left M71.22 727.51 SC DRAIN/INJECT LARGE JOINT/BURSA      EUFLEXXA INJECTION PER DOSE      sodium hyaluronate (SUPARTZ FX/HYALGAN/GENIVSC) 10 mg/mL syrg injection     PLAN:  After discussing treatment options, patient's left knee was injected with 2 cc of Euflexxa. After discussing treatment options, patient's left basal joint was injected with 1/2 cc Marcaine and 1/2 cc Celestone. I will see her back in one week for her second Euflexxa injection. We discussed possible need for left knee arthroplasty potentially unicompartmental at some time in the future. There is no need for left thumb surgery at this time.        Scribed by Melvi Vega ([facility]) as dictated by Primitivo Kidd MD

## 2018-01-19 NOTE — PATIENT INSTRUCTIONS
Knee Arthritis: Exercises  Your Care Instructions  Here are some examples of exercises for knee arthritis. Start each exercise slowly. Ease off the exercise if you start to have pain. Your doctor or physical therapist will tell you when you can start these exercises and which ones will work best for you. How to do the exercises  Knee flexion with heel slide    1. Lie on your back with your knees bent. 2. Slide your heel back by bending your affected knee as far as you can. Then hook your other foot around your ankle to help pull your heel even farther back. 3. Hold for about 6 seconds, then rest for up to 10 seconds. 4. Repeat 8 to 12 times. 5. Switch legs and repeat steps 1 through 4, even if only one knee is sore. Quad sets    1. Sit with your affected leg straight and supported on the floor or a firm bed. Place a small, rolled-up towel under your knee. Your other leg should be bent, with that foot flat on the floor. 2. Tighten the thigh muscles of your affected leg by pressing the back of your knee down into the towel. 3. Hold for about 6 seconds, then rest for up to 10 seconds. 4. Repeat 8 to 12 times. 5. Switch legs and repeat steps 1 through 4, even if only one knee is sore. Straight-leg raises to the front    1. Lie on your back with your good knee bent so that your foot rests flat on the floor. Your affected leg should be straight. Make sure that your low back has a normal curve. You should be able to slip your hand in between the floor and the small of your back, with your palm touching the floor and your back touching the back of your hand. 2. Tighten the thigh muscles in your affected leg by pressing the back of your knee flat down to the floor. Hold your knee straight. 3. Keeping the thigh muscles tight and your leg straight, lift your affected leg up so that your heel is about 12 inches off the floor. Hold for about 6 seconds, then lower slowly.   4. Relax for up to 10 seconds between repetitions. 5. Repeat 8 to 12 times. 6. Switch legs and repeat steps 1 through 5, even if only one knee is sore. Active knee flexion    1. Lie on your stomach with your knees straight. If your kneecap is uncomfortable, roll up a washcloth and put it under your leg just above your kneecap. 2. Lift the foot of your affected leg by bending the knee so that you bring the foot up toward your buttock. If this motion hurts, try it without bending your knee quite as far. This may help you avoid any painful motion. 3. Slowly move your leg up and down. 4. Repeat 8 to 12 times. 5. Switch legs and repeat steps 1 through 4, even if only one knee is sore. Quadriceps stretch (facedown)    1. Lie flat on your stomach, and rest your face on the floor. 2. Wrap a towel or belt strap around the lower part of your affected leg. Then use the towel or belt strap to slowly pull your heel toward your buttock until you feel a stretch. 3. Hold for about 15 to 30 seconds, then relax your leg against the towel or belt strap. 4. Repeat 2 to 4 times. 5. Switch legs and repeat steps 1 through 4, even if only one knee is sore. Stationary exercise bike    1. If you do not have a stationary exercise bike at home, you can find one to ride at your local health club or community center. 2. Adjust the height of the bike seat so that your knee is slightly bent when your leg is extended downward. If your knee hurts when the pedal reaches the top, you can raise the seat so that your knee does not bend as much. 3. Start slowly. At first, try to do 5 to 10 minutes of cycling with little to no resistance. Then increase your time and the resistance bit by bit until you can do 20 to 30 minutes without pain. 4. If you start to have pain, rest your knee until your pain gets back to the level that is normal for you. Or cycle for less time or with less effort. Follow-up care is a key part of your treatment and safety.  Be sure to make and go to all appointments, and call your doctor if you are having problems. It's also a good idea to know your test results and keep a list of the medicines you take. Where can you learn more? Go to http://mandy-yari.info/. Enter C159 in the search box to learn more about \"Knee Arthritis: Exercises. \"  Current as of: March 21, 2017  Content Version: 11.4  © 2006-2017 [employer] Care instructions adapted under license by [employer] (which disclaims liability or warranty for this information). If you have questions about a medical condition or this instruction, always ask your healthcare professional. [address] any warranty or liability for your use of this information. Hyaluronic Acid (By injection)   Hyaluronic Acid (xtb-ou-mbn-ON-ate AS-id)  Treats severe pain in your knee due to osteoarthritis. Brand Name(s): Euflexxa, Gel-One, GelSyn-3, GenVisc 850, Hyalgan, Hymovis, Monovisc, Orthovisc, Supartz FX, Visco-3   There may be other brand names for this medicine. When This Medicine Should Not Be Used: This medicine is not right for everyone. You should not receive it if you had an allergic reaction to hyaluronic acid or if you have a bleeding disorder. How to Use This Medicine:   Injectable  · Your doctor will tell you how many injections you will need. This medicine is injected into your knee joint. · A nurse or other health provider will give you this medicine. Drugs and Foods to Avoid:      Ask your doctor or pharmacist before using any other medicine, including over-the-counter medicines, vitamins, and herbal products. Warnings While Using This Medicine:   · Tell your doctor if you are pregnant or breastfeeding, or if you have any allergies, including to birds, feathers, or eggs. · Rest your knee for 48 hours after you receive an injection. Do not do strenuous, weightbearing activities, such as jogging or tennis.  Avoid activities that keep you standing for longer than 1 hour. Possible Side Effects While Using This Medicine:   Call your doctor right away if you notice any of these side effects:  · Allergic reaction: Itching or hives, swelling in your face or hands, swelling or tingling in your mouth or throat, chest tightness, trouble breathing  If you notice these less serious side effects, talk with your doctor:   · Mild increase in pain or swelling in your knee  · Pain, redness, or swelling where the medicine is injected  If you notice other side effects that you think are caused by this medicine, tell your doctor. Call your doctor for medical advice about side effects. You may report side effects to FDA at 2-723-FDA-2182  © 2017 [facility] Information is for End User's use only and may not be sold, redistributed or otherwise used for commercial purposes. The above information is an  only. It is not intended as medical advice for individual conditions or treatments. Talk to your doctor, nurse or pharmacist before following any medical regimen to see if it is safe and effective for you.

## 2018-01-26 ENCOUNTER — OFFICE VISIT (OUTPATIENT)
Dept: ORTHOPEDIC SURGERY | Facility: CLINIC | Age: 68
End: 2018-01-26

## 2018-01-26 VITALS
TEMPERATURE: 97.8 F | SYSTOLIC BLOOD PRESSURE: 117 MMHG | WEIGHT: 142 LBS | BODY MASS INDEX: 24.24 KG/M2 | DIASTOLIC BLOOD PRESSURE: 64 MMHG | HEIGHT: 64 IN | OXYGEN SATURATION: 98 % | HEART RATE: 84 BPM

## 2018-01-26 DIAGNOSIS — M25.562 CHRONIC PAIN OF LEFT KNEE: ICD-10-CM

## 2018-01-26 DIAGNOSIS — M79.642 LEFT HAND PAIN: ICD-10-CM

## 2018-01-26 DIAGNOSIS — M18.12 PRIMARY OSTEOARTHRITIS OF FIRST CARPOMETACARPAL JOINT OF ONE HAND, LEFT: ICD-10-CM

## 2018-01-26 DIAGNOSIS — M17.12 PRIMARY OSTEOARTHRITIS OF LEFT KNEE: Primary | ICD-10-CM

## 2018-01-26 DIAGNOSIS — G89.29 CHRONIC PAIN OF LEFT KNEE: ICD-10-CM

## 2018-01-26 DIAGNOSIS — M71.22 POPLITEAL CYST, LEFT: ICD-10-CM

## 2018-01-26 RX ORDER — HYALURONATE SODIUM 10 MG/ML
2 SYRINGE (ML) INTRAARTICULAR ONCE
Qty: 2 ML | Refills: 0
Start: 2018-01-26 | End: 2018-01-26

## 2018-01-26 RX ORDER — BUPIVACAINE HYDROCHLORIDE 2.5 MG/ML
0.5 INJECTION, SOLUTION EPIDURAL; INFILTRATION; INTRACAUDAL ONCE
Qty: 0.5 ML | Refills: 0
Start: 2018-01-26 | End: 2018-01-26

## 2018-01-26 RX ORDER — BETAMETHASONE SODIUM PHOSPHATE AND BETAMETHASONE ACETATE 3; 3 MG/ML; MG/ML
6 INJECTION, SUSPENSION INTRA-ARTICULAR; INTRALESIONAL; INTRAMUSCULAR; SOFT TISSUE ONCE
Qty: 0.5 ML | Refills: 0
Start: 2018-01-26 | End: 2018-01-26
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
## 2018-01-26 NOTE — PATIENT INSTRUCTIONS
Knee Arthritis: Exercises  Your Care Instructions  Here are some examples of exercises for knee arthritis. Start each exercise slowly. Ease off the exercise if you start to have pain. Your doctor or physical therapist will tell you when you can start these exercises and which ones will work best for you. How to do the exercises  Knee flexion with heel slide    1. Lie on your back with your knees bent. 2. Slide your heel back by bending your affected knee as far as you can. Then hook your other foot around your ankle to help pull your heel even farther back. 3. Hold for about 6 seconds, then rest for up to 10 seconds. 4. Repeat 8 to 12 times. 5. Switch legs and repeat steps 1 through 4, even if only one knee is sore. Quad sets    1. Sit with your affected leg straight and supported on the floor or a firm bed. Place a small, rolled-up towel under your knee. Your other leg should be bent, with that foot flat on the floor. 2. Tighten the thigh muscles of your affected leg by pressing the back of your knee down into the towel. 3. Hold for about 6 seconds, then rest for up to 10 seconds. 4. Repeat 8 to 12 times. 5. Switch legs and repeat steps 1 through 4, even if only one knee is sore. Straight-leg raises to the front    1. Lie on your back with your good knee bent so that your foot rests flat on the floor. Your affected leg should be straight. Make sure that your low back has a normal curve. You should be able to slip your hand in between the floor and the small of your back, with your palm touching the floor and your back touching the back of your hand. 2. Tighten the thigh muscles in your affected leg by pressing the back of your knee flat down to the floor. Hold your knee straight. 3. Keeping the thigh muscles tight and your leg straight, lift your affected leg up so that your heel is about 12 inches off the floor. Hold for about 6 seconds, then lower slowly.   4. Relax for up to 10 seconds between repetitions. 5. Repeat 8 to 12 times. 6. Switch legs and repeat steps 1 through 5, even if only one knee is sore. Active knee flexion    1. Lie on your stomach with your knees straight. If your kneecap is uncomfortable, roll up a washcloth and put it under your leg just above your kneecap. 2. Lift the foot of your affected leg by bending the knee so that you bring the foot up toward your buttock. If this motion hurts, try it without bending your knee quite as far. This may help you avoid any painful motion. 3. Slowly move your leg up and down. 4. Repeat 8 to 12 times. 5. Switch legs and repeat steps 1 through 4, even if only one knee is sore. Quadriceps stretch (facedown)    1. Lie flat on your stomach, and rest your face on the floor. 2. Wrap a towel or belt strap around the lower part of your affected leg. Then use the towel or belt strap to slowly pull your heel toward your buttock until you feel a stretch. 3. Hold for about 15 to 30 seconds, then relax your leg against the towel or belt strap. 4. Repeat 2 to 4 times. 5. Switch legs and repeat steps 1 through 4, even if only one knee is sore. Stationary exercise bike    1. If you do not have a stationary exercise bike at home, you can find one to ride at your local health club or community center. 2. Adjust the height of the bike seat so that your knee is slightly bent when your leg is extended downward. If your knee hurts when the pedal reaches the top, you can raise the seat so that your knee does not bend as much. 3. Start slowly. At first, try to do 5 to 10 minutes of cycling with little to no resistance. Then increase your time and the resistance bit by bit until you can do 20 to 30 minutes without pain. 4. If you start to have pain, rest your knee until your pain gets back to the level that is normal for you. Or cycle for less time or with less effort. Follow-up care is a key part of your treatment and safety.  Be sure to make and go to all appointments, and call your doctor if you are having problems. It's also a good idea to know your test results and keep a list of the medicines you take. Where can you learn more? Go to http://mandy-yari.info/. Enter C159 in the search box to learn more about \"Knee Arthritis: Exercises. \"  Current as of: March 21, 2017  Content Version: 11.4  © 2006-2017 [employer]. Care instructions adapted under license by [employer] (which disclaims liability or warranty for this information). If you have questions about a medical condition or this instruction, always ask your healthcare professional. [address] any warranty or liability for your use of this information. Hyaluronic Acid (By injection)   Hyaluronic Acid (zqt-hs-gqb-ON-ate AS-id)  Treats severe pain in your knee due to osteoarthritis. Brand Name(s): Euflexxa, Gel-One, GelSyn-3, GenVisc 850, Hyalgan, Hymovis, Monovisc, Orthovisc, Supartz FX, Visco-3   There may be other brand names for this medicine. When This Medicine Should Not Be Used: This medicine is not right for everyone. You should not receive it if you had an allergic reaction to hyaluronic acid or if you have a bleeding disorder. How to Use This Medicine:   Injectable  · Your doctor will tell you how many injections you will need. This medicine is injected into your knee joint. · A nurse or other health provider will give you this medicine. Drugs and Foods to Avoid:      Ask your doctor or pharmacist before using any other medicine, including over-the-counter medicines, vitamins, and herbal products. Warnings While Using This Medicine:   · Tell your doctor if you are pregnant or breastfeeding, or if you have any allergies, including to birds, feathers, or eggs. · Rest your knee for 48 hours after you receive an injection. Do not do strenuous, weightbearing activities, such as jogging or tennis.  Avoid activities that keep you standing for longer than 1 hour. Possible Side Effects While Using This Medicine:   Call your doctor right away if you notice any of these side effects:  · Allergic reaction: Itching or hives, swelling in your face or hands, swelling or tingling in your mouth or throat, chest tightness, trouble breathing  If you notice these less serious side effects, talk with your doctor:   · Mild increase in pain or swelling in your knee  · Pain, redness, or swelling where the medicine is injected  If you notice other side effects that you think are caused by this medicine, tell your doctor. Call your doctor for medical advice about side effects. You may report side effects to FDA at 8-648-FDA-8394  © 2017 [address] Information is for End User's use only and may not be sold, redistributed or otherwise used for commercial purposes. The above information is an  only. It is not intended as medical advice for individual conditions or treatments. Talk to your doctor, nurse or pharmacist before following any medical regimen to see if it is safe and effective for you.

## 2018-02-02 ENCOUNTER — OFFICE VISIT (OUTPATIENT)
Dept: ORTHOPEDIC SURGERY | Facility: CLINIC | Age: 68
End: 2018-02-02

## 2018-02-02 VITALS
SYSTOLIC BLOOD PRESSURE: 114 MMHG | HEIGHT: 64 IN | TEMPERATURE: 96.4 F | HEART RATE: 84 BPM | RESPIRATION RATE: 18 BRPM | DIASTOLIC BLOOD PRESSURE: 67 MMHG | BODY MASS INDEX: 23.76 KG/M2 | OXYGEN SATURATION: 99 % | WEIGHT: 139.2 LBS

## 2018-02-02 DIAGNOSIS — M17.12 PRIMARY OSTEOARTHRITIS OF LEFT KNEE: Primary | ICD-10-CM

## 2018-02-02 DIAGNOSIS — G89.29 CHRONIC PAIN OF LEFT KNEE: ICD-10-CM

## 2018-02-02 DIAGNOSIS — M71.22 POPLITEAL CYST, LEFT: ICD-10-CM

## 2018-02-02 DIAGNOSIS — M25.562 CHRONIC PAIN OF LEFT KNEE: ICD-10-CM

## 2018-02-02 RX ORDER — HYALURONATE SODIUM 10 MG/ML
2 SYRINGE (ML) INTRAARTICULAR ONCE
Qty: 2 ML | Refills: 0
Start: 2018-02-02 | End: 2018-02-02

## 2018-02-02 NOTE — PROGRESS NOTES
Patient: Frannie Cintron                MRN: [id number]       SSN: [id number]  YOB: 1950        AGE: 79 y.o. SEX: female  Body mass index is 23.89 kg/(m^2). PCP: XIOMARA Poe  02/02/18    Chief Complaint   Patient presents with    Knee Pain     Left     HISTORY:  Frannie Cintron is a 79 y.o. female who is seen for left knee pain. PROCEDURE:  After discussing treatment options, Ms. Kanika Puga left knee was injected with 2 cc of Euflexxa. TIME OUT performed immediately prior to start of procedure:  Thai Hamm MD, have performed the following reviews on Frannie Cintron prior to the start of the procedure:            * Patient was identified by name and date of birth   * Agreement on procedure being performed was verified  * Risks and Benefits explained to the patient  * Procedure site verified and marked as necessary  * Patient was positioned for comfort  * Consent was obtained     Time: 12:04 PM     Date of procedure: 2/2/2018    Procedure performed by:  Velma Kennedy MD    Ms. Tamara Nj tolerated the procedure well with no complications      [id number] ICD-9-CM    1. Primary osteoarthritis of left knee M17.12 715.16 [place] DRAIN/INJECT LARGE JOINT/BURSA      EUFLEXXA INJECTION PER DOSE      sodium hyaluronate (SUPARTZ FX/HYALGAN/GENIVSC) 10 mg/mL syrg injection   2. Chronic pain of left knee M25.562 719.46 [place] DRAIN/INJECT LARGE JOINT/BURSA    G89.29 338.29 EUFLEXXA INJECTION PER DOSE      sodium hyaluronate (SUPARTZ FX/HYALGAN/GENIVSC) 10 mg/mL syrg injection   3. Popliteal cyst, left M71.22 727.51 NJ DRAIN/INJECT LARGE JOINT/BURSA      EUFLEXXA INJECTION PER DOSE      sodium hyaluronate (SUPARTZ FX/HYALGAN/GENIVSC) 10 mg/mL syrg injection     PLAN:  After discussing treatment options, patient's left knee was injected with 2 cc of Euflexxa. Ms. Tamara Nj will follow up PRN now that she has completed her Euflexxa injection series.       Scribed by Pauly Tapia ([address]) as dictated by Suzy Rios MD

## 2018-02-02 NOTE — PATIENT INSTRUCTIONS
Knee Arthritis: Exercises  Your Care Instructions  Here are some examples of exercises for knee arthritis. Start each exercise slowly. Ease off the exercise if you start to have pain. Your doctor or physical therapist will tell you when you can start these exercises and which ones will work best for you. How to do the exercises  Knee flexion with heel slide    1. Lie on your back with your knees bent. 2. Slide your heel back by bending your affected knee as far as you can. Then hook your other foot around your ankle to help pull your heel even farther back. 3. Hold for about 6 seconds, then rest for up to 10 seconds. 4. Repeat 8 to 12 times. 5. Switch legs and repeat steps 1 through 4, even if only one knee is sore. Quad sets    1. Sit with your affected leg straight and supported on the floor or a firm bed. Place a small, rolled-up towel under your knee. Your other leg should be bent, with that foot flat on the floor. 2. Tighten the thigh muscles of your affected leg by pressing the back of your knee down into the towel. 3. Hold for about 6 seconds, then rest for up to 10 seconds. 4. Repeat 8 to 12 times. 5. Switch legs and repeat steps 1 through 4, even if only one knee is sore. Straight-leg raises to the front    1. Lie on your back with your good knee bent so that your foot rests flat on the floor. Your affected leg should be straight. Make sure that your low back has a normal curve. You should be able to slip your hand in between the floor and the small of your back, with your palm touching the floor and your back touching the back of your hand. 2. Tighten the thigh muscles in your affected leg by pressing the back of your knee flat down to the floor. Hold your knee straight. 3. Keeping the thigh muscles tight and your leg straight, lift your affected leg up so that your heel is about 12 inches off the floor. Hold for about 6 seconds, then lower slowly.   4. Relax for up to 10 seconds between repetitions. 5. Repeat 8 to 12 times. 6. Switch legs and repeat steps 1 through 5, even if only one knee is sore. Active knee flexion    1. Lie on your stomach with your knees straight. If your kneecap is uncomfortable, roll up a washcloth and put it under your leg just above your kneecap. 2. Lift the foot of your affected leg by bending the knee so that you bring the foot up toward your buttock. If this motion hurts, try it without bending your knee quite as far. This may help you avoid any painful motion. 3. Slowly move your leg up and down. 4. Repeat 8 to 12 times. 5. Switch legs and repeat steps 1 through 4, even if only one knee is sore. Quadriceps stretch (facedown)    1. Lie flat on your stomach, and rest your face on the floor. 2. Wrap a towel or belt strap around the lower part of your affected leg. Then use the towel or belt strap to slowly pull your heel toward your buttock until you feel a stretch. 3. Hold for about 15 to 30 seconds, then relax your leg against the towel or belt strap. 4. Repeat 2 to 4 times. 5. Switch legs and repeat steps 1 through 4, even if only one knee is sore. Stationary exercise bike    1. If you do not have a stationary exercise bike at home, you can find one to ride at your local health club or community center. 2. Adjust the height of the bike seat so that your knee is slightly bent when your leg is extended downward. If your knee hurts when the pedal reaches the top, you can raise the seat so that your knee does not bend as much. 3. Start slowly. At first, try to do 5 to 10 minutes of cycling with little to no resistance. Then increase your time and the resistance bit by bit until you can do 20 to 30 minutes without pain. 4. If you start to have pain, rest your knee until your pain gets back to the level that is normal for you. Or cycle for less time or with less effort. Follow-up care is a key part of your treatment and safety.  Be sure to make and go to all appointments, and call your doctor if you are having problems. It's also a good idea to know your test results and keep a list of the medicines you take. Where can you learn more? Go to http://mandy-yari.info/. Enter C159 in the search box to learn more about \"Knee Arthritis: Exercises. \"  Current as of: March 21, 2017  Content Version: 11.4  © 2006-2017 [employer]. Care instructions adapted under license by [employer] (which disclaims liability or warranty for this information). If you have questions about a medical condition or this instruction, always ask your healthcare professional. [address] any warranty or liability for your use of this information. Hyaluronic Acid (By injection)   Hyaluronic Acid (ufi-ul-cle-ON-ate AS-id)  Treats severe pain in your knee due to osteoarthritis. Brand Name(s): Euflexxa, Gel-One, GelSyn-3, GenVisc 850, Hyalgan, Hymovis, Monovisc, Orthovisc, Supartz FX, Visco-3   There may be other brand names for this medicine. When This Medicine Should Not Be Used: This medicine is not right for everyone. You should not receive it if you had an allergic reaction to hyaluronic acid or if you have a bleeding disorder. How to Use This Medicine:   Injectable  · Your doctor will tell you how many injections you will need. This medicine is injected into your knee joint. · A nurse or other health provider will give you this medicine. Drugs and Foods to Avoid:      Ask your doctor or pharmacist before using any other medicine, including over-the-counter medicines, vitamins, and herbal products. Warnings While Using This Medicine:   · Tell your doctor if you are pregnant or breastfeeding, or if you have any allergies, including to birds, feathers, or eggs. · Rest your knee for 48 hours after you receive an injection. Do not do strenuous, weightbearing activities, such as jogging or tennis.  Avoid activities that keep you standing for longer than 1 hour. Possible Side Effects While Using This Medicine:   Call your doctor right away if you notice any of these side effects:  · Allergic reaction: Itching or hives, swelling in your face or hands, swelling or tingling in your mouth or throat, chest tightness, trouble breathing  If you notice these less serious side effects, talk with your doctor:   · Mild increase in pain or swelling in your knee  · Pain, redness, or swelling where the medicine is injected  If you notice other side effects that you think are caused by this medicine, tell your doctor. Call your doctor for medical advice about side effects. You may report side effects to FDA at 6-778-FDA-2768  © 2017 [facility] Information is for End User's use only and may not be sold, redistributed or otherwise used for commercial purposes. The above information is an  only. It is not intended as medical advice for individual conditions or treatments. Talk to your doctor, nurse or pharmacist before following any medical regimen to see if it is safe and effective for you.

## 2018-02-09 ENCOUNTER — OFFICE VISIT (OUTPATIENT)
Dept: ORTHOPEDIC SURGERY | Facility: CLINIC | Age: 68
End: 2018-02-09

## 2018-02-09 VITALS
BODY MASS INDEX: 24.31 KG/M2 | TEMPERATURE: 97.3 F | OXYGEN SATURATION: 98 % | SYSTOLIC BLOOD PRESSURE: 116 MMHG | WEIGHT: 142.4 LBS | HEART RATE: 80 BPM | DIASTOLIC BLOOD PRESSURE: 67 MMHG | RESPIRATION RATE: 18 BRPM | HEIGHT: 64 IN

## 2018-02-09 DIAGNOSIS — M17.12 PRIMARY OSTEOARTHRITIS OF LEFT KNEE: Primary | ICD-10-CM

## 2018-02-09 DIAGNOSIS — M71.22 POPLITEAL CYST, LEFT: ICD-10-CM

## 2018-02-09 DIAGNOSIS — G89.29 CHRONIC PAIN OF LEFT KNEE: ICD-10-CM

## 2018-02-09 DIAGNOSIS — M25.562 CHRONIC PAIN OF LEFT KNEE: ICD-10-CM

## 2018-02-09 RX ORDER — HYALURONATE SODIUM 10 MG/ML
2 SYRINGE (ML) INTRAARTICULAR ONCE
Qty: 2 ML | Refills: 0
Start: 2018-02-09 | End: 2018-02-09

## 2018-02-09 NOTE — PROGRESS NOTES
Patient: Amber Jones                MRN: [id number]       SSN: [id number]  YOB: 1950        AGE: 79 y.o. SEX: female  Body mass index is 24.44 kg/(m^2). PCP: XIOMARA Reis  02/09/18    Chief Complaint   Patient presents with    Knee Pain     Left     HISTORY:  Amber Jones is a 79 y.o. female who is seen for left knee pain. PROCEDURE:  After discussing treatment options, Ms. Gerhardt Dikes left knee was injected with 2 cc of Euflexxa. TIME OUT performed immediately prior to start of procedure:  Carlos Cintron MD, have performed the following reviews on Amber Jones prior to the start of the procedure:            * Patient was identified by name and date of birth   * Agreement on procedure being performed was verified  * Risks and Benefits explained to the patient  * Procedure site verified and marked as necessary  * Patient was positioned for comfort  * Consent was obtained     Time: 11:38 AM     Date of procedure: 2/9/2018    Procedure performed by:  Jerry Arreguin MD    Ms. Ulises Vasquez tolerated the procedure well with no complications      VRQ-[id number] ICD-9-CM    1. Primary osteoarthritis of left knee M17.12 715.16 [place] DRAIN/INJECT LARGE JOINT/BURSA      EUFLEXXA INJECTION PER DOSE      sodium hyaluronate (SUPARTZ FX/HYALGAN/GENIVSC) 10 mg/mL syrg injection   2. Chronic pain of left knee M25.562 719.46 ND DRAIN/INJECT LARGE JOINT/BURSA    G89.29 338.29 EUFLEXXA INJECTION PER DOSE      sodium hyaluronate (SUPARTZ FX/HYALGAN/GENIVSC) 10 mg/mL syrg injection   3. Popliteal cyst, left M71.22 727.51 ND DRAIN/INJECT LARGE JOINT/BURSA      EUFLEXXA INJECTION PER DOSE      sodium hyaluronate (SUPARTZ FX/HYALGAN/GENIVSC) 10 mg/mL syrg injection     PLAN:  After discussing treatment options, patient's left knee was injected with 2 cc of Euflexxa. Ms. Ulises Vasquez will follow up PRN now that she has completed her Euflexxa injection series.       Scribed by Renetta Bland ([address]) as dictated by Reginald Dorado MD

## 2018-02-09 NOTE — PATIENT INSTRUCTIONS
Knee Arthritis: Exercises  Your Care Instructions  Here are some examples of exercises for knee arthritis. Start each exercise slowly. Ease off the exercise if you start to have pain. Your doctor or physical therapist will tell you when you can start these exercises and which ones will work best for you. How to do the exercises  Knee flexion with heel slide    1. Lie on your back with your knees bent. 2. Slide your heel back by bending your affected knee as far as you can. Then hook your other foot around your ankle to help pull your heel even farther back. 3. Hold for about 6 seconds, then rest for up to 10 seconds. 4. Repeat 8 to 12 times. 5. Switch legs and repeat steps 1 through 4, even if only one knee is sore. Quad sets    1. Sit with your affected leg straight and supported on the floor or a firm bed. Place a small, rolled-up towel under your knee. Your other leg should be bent, with that foot flat on the floor. 2. Tighten the thigh muscles of your affected leg by pressing the back of your knee down into the towel. 3. Hold for about 6 seconds, then rest for up to 10 seconds. 4. Repeat 8 to 12 times. 5. Switch legs and repeat steps 1 through 4, even if only one knee is sore. Straight-leg raises to the front    1. Lie on your back with your good knee bent so that your foot rests flat on the floor. Your affected leg should be straight. Make sure that your low back has a normal curve. You should be able to slip your hand in between the floor and the small of your back, with your palm touching the floor and your back touching the back of your hand. 2. Tighten the thigh muscles in your affected leg by pressing the back of your knee flat down to the floor. Hold your knee straight. 3. Keeping the thigh muscles tight and your leg straight, lift your affected leg up so that your heel is about 12 inches off the floor. Hold for about 6 seconds, then lower slowly.   4. Relax for up to 10 seconds between repetitions. 5. Repeat 8 to 12 times. 6. Switch legs and repeat steps 1 through 5, even if only one knee is sore. Active knee flexion    1. Lie on your stomach with your knees straight. If your kneecap is uncomfortable, roll up a washcloth and put it under your leg just above your kneecap. 2. Lift the foot of your affected leg by bending the knee so that you bring the foot up toward your buttock. If this motion hurts, try it without bending your knee quite as far. This may help you avoid any painful motion. 3. Slowly move your leg up and down. 4. Repeat 8 to 12 times. 5. Switch legs and repeat steps 1 through 4, even if only one knee is sore. Quadriceps stretch (facedown)    1. Lie flat on your stomach, and rest your face on the floor. 2. Wrap a towel or belt strap around the lower part of your affected leg. Then use the towel or belt strap to slowly pull your heel toward your buttock until you feel a stretch. 3. Hold for about 15 to 30 seconds, then relax your leg against the towel or belt strap. 4. Repeat 2 to 4 times. 5. Switch legs and repeat steps 1 through 4, even if only one knee is sore. Stationary exercise bike    1. If you do not have a stationary exercise bike at home, you can find one to ride at your local health club or community center. 2. Adjust the height of the bike seat so that your knee is slightly bent when your leg is extended downward. If your knee hurts when the pedal reaches the top, you can raise the seat so that your knee does not bend as much. 3. Start slowly. At first, try to do 5 to 10 minutes of cycling with little to no resistance. Then increase your time and the resistance bit by bit until you can do 20 to 30 minutes without pain. 4. If you start to have pain, rest your knee until your pain gets back to the level that is normal for you. Or cycle for less time or with less effort. Follow-up care is a key part of your treatment and safety.  Be sure to make and go to all appointments, and call your doctor if you are having problems. It's also a good idea to know your test results and keep a list of the medicines you take. Where can you learn more? Go to http://mandy-yari.info/. Enter C159 in the search box to learn more about \"Knee Arthritis: Exercises. \"  Current as of: March 21, 2017  Content Version: 11.4  © 2006-2017 [employer]. Care instructions adapted under license by [employer] (which disclaims liability or warranty for this information). If you have questions about a medical condition or this instruction, always ask your healthcare professional. [address] any warranty or liability for your use of this information. Hyaluronic Acid (By injection)   Hyaluronic Acid (ams-od-yqx-ON-ate AS-id)  Treats severe pain in your knee due to osteoarthritis. Brand Name(s): Euflexxa, Gel-One, GelSyn-3, GenVisc 850, Hyalgan, Hymovis, Monovisc, Orthovisc, Supartz FX, Visco-3   There may be other brand names for this medicine. When This Medicine Should Not Be Used: This medicine is not right for everyone. You should not receive it if you had an allergic reaction to hyaluronic acid or if you have a bleeding disorder. How to Use This Medicine:   Injectable  · Your doctor will tell you how many injections you will need. This medicine is injected into your knee joint. · A nurse or other health provider will give you this medicine. Drugs and Foods to Avoid:      Ask your doctor or pharmacist before using any other medicine, including over-the-counter medicines, vitamins, and herbal products. Warnings While Using This Medicine:   · Tell your doctor if you are pregnant or breastfeeding, or if you have any allergies, including to birds, feathers, or eggs. · Rest your knee for 48 hours after you receive an injection. Do not do strenuous, weightbearing activities, such as jogging or tennis.  Avoid activities that keep you standing for longer than 1 hour. Possible Side Effects While Using This Medicine:   Call your doctor right away if you notice any of these side effects:  · Allergic reaction: Itching or hives, swelling in your face or hands, swelling or tingling in your mouth or throat, chest tightness, trouble breathing  If you notice these less serious side effects, talk with your doctor:   · Mild increase in pain or swelling in your knee  · Pain, redness, or swelling where the medicine is injected  If you notice other side effects that you think are caused by this medicine, tell your doctor. Call your doctor for medical advice about side effects. You may report side effects to FDA at 4-652-FDA-1509  © 2017 [address] Information is for End User's use only and may not be sold, redistributed or otherwise used for commercial purposes. The above information is an  only. It is not intended as medical advice for individual conditions or treatments. Talk to your doctor, nurse or pharmacist before following any medical regimen to see if it is safe and effective for you.

## 2018-04-19 ENCOUNTER — OFFICE VISIT (OUTPATIENT)
Dept: ORTHOPEDIC SURGERY | Facility: CLINIC | Age: 68
End: 2018-04-19

## 2018-04-19 VITALS
HEIGHT: 64 IN | RESPIRATION RATE: 16 BRPM | WEIGHT: 140.2 LBS | HEART RATE: 79 BPM | DIASTOLIC BLOOD PRESSURE: 58 MMHG | BODY MASS INDEX: 23.93 KG/M2 | OXYGEN SATURATION: 97 % | TEMPERATURE: 96.9 F | SYSTOLIC BLOOD PRESSURE: 107 MMHG

## 2018-04-19 DIAGNOSIS — M18.12 PRIMARY OSTEOARTHRITIS OF FIRST CARPOMETACARPAL JOINT OF ONE HAND, LEFT: ICD-10-CM

## 2018-04-19 DIAGNOSIS — M25.562 CHRONIC PAIN OF LEFT KNEE: ICD-10-CM

## 2018-04-19 DIAGNOSIS — M79.642 LEFT HAND PAIN: ICD-10-CM

## 2018-04-19 DIAGNOSIS — M71.22 POPLITEAL CYST, LEFT: ICD-10-CM

## 2018-04-19 DIAGNOSIS — M17.12 PRIMARY OSTEOARTHRITIS OF LEFT KNEE: Primary | ICD-10-CM

## 2018-04-19 DIAGNOSIS — G89.29 CHRONIC PAIN OF LEFT KNEE: ICD-10-CM

## 2018-04-19 RX ORDER — BETAMETHASONE SODIUM PHOSPHATE AND BETAMETHASONE ACETATE 3; 3 MG/ML; MG/ML
6 INJECTION, SUSPENSION INTRA-ARTICULAR; INTRALESIONAL; INTRAMUSCULAR; SOFT TISSUE ONCE
Qty: 0.5 ML | Refills: 0 | Status: CANCELLED
Start: 2018-04-19 | End: 2018-04-19

## 2018-04-19 RX ORDER — BETAMETHASONE SODIUM PHOSPHATE AND BETAMETHASONE ACETATE 3; 3 MG/ML; MG/ML
6 INJECTION, SUSPENSION INTRA-ARTICULAR; INTRALESIONAL; INTRAMUSCULAR; SOFT TISSUE ONCE
Qty: 0.5 ML | Refills: 0
Start: 2018-04-19 | End: 2018-04-19

## 2018-04-19 RX ORDER — BUPIVACAINE HYDROCHLORIDE 2.5 MG/ML
4 INJECTION, SOLUTION EPIDURAL; INFILTRATION; INTRACAUDAL ONCE
Qty: 4 ML | Refills: 0 | Status: CANCELLED
Start: 2018-04-19 | End: 2018-04-19

## 2018-04-19 RX ORDER — BUPIVACAINE HYDROCHLORIDE 2.5 MG/ML
0.5 INJECTION, SOLUTION EPIDURAL; INFILTRATION; INTRACAUDAL ONCE
Qty: 0.5 ML | Refills: 0
Start: 2018-04-19 | End: 2018-04-19

## 2018-04-19 NOTE — PATIENT INSTRUCTIONS
Thumb Arthritis: Exercises  Your Care Instructions  Here are some examples of exercises for thumb arthritis. Start each exercise slowly. Ease off the exercise if you start to have pain. Your doctor or your physical or occupational therapist will tell you when you can start these exercises and which ones will work best for you. How to do the exercises  Thumb IP flexion    1. Place your forearm and hand on a table with your affected thumb pointing up. 2. With your other hand, hold your thumb steady just below the joint nearest your thumbnail. 3. Bend the tip of your thumb downward, then straighten it. 4. Repeat 8 to 12 times. 5. Switch hands and repeat steps 1 through 4, even if only one thumb is sore. Thumb MP flexion    1. Place your forearm and hand on a table with your affected thumb pointing up. 2. With your other hand, hold the base of your thumb and palm steady. 3. Bend your thumb downward where it meets your palm, then straighten it. 4. Repeat 8 to 12 times. 5. Switch hands and repeat steps 1 through 4, even if only one thumb is sore. Thumb opposition    1. With your affected hand, point your fingers and thumb straight up. Your wrist should be relaxed, following the line of your fingers and thumb. 2. Touch your affected thumb to each finger, one finger at a time. This will look like an \"okay\" sign, but try to keep your other fingers straight and pointing upward as much as you can. 3. Repeat 8 to 12 times. 4. Switch hands and repeat steps 1 through 3, even if only one thumb is sore. Follow-up care is a key part of your treatment and safety. Be sure to make and go to all appointments, and call your doctor if you are having problems. It's also a good idea to know your test results and keep a list of the medicines you take. Where can you learn more? Go to http://mandy-yari.info/. Enter C457 in the search box to learn more about \"Thumb Arthritis: Exercises. \"  Current as of: March 21, 2017  Content Version: 11.4  © 8380-2440 [employer]. Care instructions adapted under license by [employer] (which disclaims liability or warranty for this information). If you have questions about a medical condition or this instruction, always ask your healthcare professional. [address] any warranty or liability for your use of this information. Joint Injections: Care Instructions  Your Care Instructions  Joint injections are shots into a joint, such as the knee. They may be used to put in medicines, such as pain relievers. Or they can be used to take out fluid. Sometimes the fluid is tested in a lab. This can help find the cause of a joint problem. A corticosteroid, or steroid, shot is used to reduce inflammation in tendons or joints. It is often used to treat problems such as arthritis, tendinitis, and bursitis. Steroids can be injected directly into a painful, inflamed joint. They can also help reduce inflammation of a bursa. A bursa is a sac of fluid. It cushions and lubricates areas where tendons, ligaments, skin, muscles, or bones rub against each other. A steroid shot can sometimes help with short-term pain relief when other treatments haven't worked. If steroid shots help, pain may improve for weeks or months. Follow-up care is a key part of your treatment and safety. Be sure to make and go to all appointments, and call your doctor if you are having problems. It's also a good idea to know your test results and keep a list of the medicines you take. How can you care for yourself at home? · Put ice or a cold pack on the area for 10 to 20 minutes at a time. Put a thin cloth between the ice and your skin. · Take anti-inflammatory medicines to reduce pain, swelling, or inflammation. These include ibuprofen (Advil, Motrin) and naproxen (Aleve). Read and follow all instructions on the label.   · Avoid strenuous activities for several days, especially those that put stress on the area where you got the shot. · If you have dressings over the area, keep them clean and dry. You may remove them when your doctor tells you to. When should you call for help? Call your doctor now or seek immediate medical care if:  ? · You have signs of infection, such as:  ¨ Increased pain, swelling, warmth, or redness. ¨ Red streaks leading from the site. ¨ Pus draining from the site. ¨ A fever. ? Watch closely for changes in your health, and be sure to contact your doctor if you have any problems. Where can you learn more? Go to http://mandy-yari.info/. Enter N616 in the search box to learn more about \"Joint Injections: Care Instructions. \"  Current as of: March 21, 2017  Content Version: 11.4  © 3243-1414 [employer]. Care instructions adapted under license by [employer] (which disclaims liability or warranty for this information). If you have questions about a medical condition or this instruction, always ask your healthcare professional. [place] [address] any warranty or liability for your use of this information.
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
## 2018-04-19 NOTE — MR AVS SNAPSHOT
[address] [place] [address], [address] [place] [address] 
[phone] Patient: Natalie Woods MRN: [id number] [facility]:[birth year] Visit Information Date & Time Provider Department Dept. Phone Encounter #  
 4/19/2018  8:55 AM Tova Bills MD [address] [facility] [phone] Follow-up Instructions Return if symptoms worsen or fail to improve. Upcoming Health Maintenance Date Due Hepatitis C Screening 1950 FOBT Q 1 YEAR AGE 50-75 8/24/2000 ZOSTER VACCINE AGE 60> 6/24/2010 GLAUCOMA SCREENING Q2Y 8/24/2015 Pneumococcal 65+ Low/Medium Risk (1 of 2 - PCV13) 8/24/2015 Influenza Age 5 to Adult 8/1/2017 MEDICARE YEARLY EXAM 3/14/2018 BREAST CANCER SCRN MAMMOGRAM 8/18/2019 DTaP/Tdap/Td series (2 - Td) 5/17/2023 Allergies as of 4/19/2018  Review Complete On: 2/9/2018 By: Tova Bills MD  
  
 Severity Noted Reaction Type Reactions Topamax [Topiramate]  04/07/2016    Other (comments) Whole body tingling Blurry vision Current Immunizations  Never Reviewed Name Date Tdap 5/17/2013  7:57 PM  
  
 Not reviewed this visit You Were Diagnosed With   
  
 Codes Comments Primary osteoarthritis of left knee    -  Primary ICD-10-CM: M17.12 
ICD-9-CM: 715.16 Chronic pain of left knee     ICD-10-CM: M25.562, G89.29 ICD-9-CM: 719.46, 338.29 Popliteal cyst, left     ICD-10-CM: M71.22 
ICD-9-CM: 727.51 Primary osteoarthritis of first carpometacarpal joint of one hand, left     ICD-10-CM: M18.12 
ICD-9-CM: 715.14 Left hand pain     ICD-10-CM: R56.341 ICD-9-CM: 729.5 Vitals BP Pulse Temp Resp Height(growth percentile) Weight(growth percentile) 107/58 79 96.9 °F (36.1 °C) (Oral) 16 5' 4\" (1.626 m) 140 lb 3.2 oz (63.6 kg) SpO2 BMI OB Status Smoking Status 97% 24.07 kg/m2 Postmenopausal Former Smoker BMI and BSA Data Body Mass Index Body Surface Area 24.07 kg/m 2 1.69 m 2 Preferred Pharmacy Pharmacy Name Phone [facility] [address] [place] [address]. [phone] Your Updated Medication List  
  
   
This list is accurate as of 4/19/18 10:21 AM.  Always use your most recent med list.  
  
  
  
  
 aspirin 81 mg tablet Take 81 mg by mouth. B.infantis-B.ani-B.long-B.bifi 10-15 mg Tbec Take  by mouth.  
  
 calcium 600 mg Cap Take  by mouth daily. FISH OIL 1,000 mg Cap Generic drug:  omega-3 fatty acids-vitamin e Take 1 Cap by mouth daily. PRILOSEC OTC PO Take 40 mg by mouth daily. PROzac 20 mg capsule Generic drug:  FLUoxetine Take 60 mg by mouth daily. SLOW FE PO Take  by mouth daily. traZODone 50 mg tablet Commonly known as:  Stevie Caruso Take 100 mg by mouth nightly. VITAMIN B-12 500 mcg tablet Generic drug:  cyanocobalamin Take 500 mcg by mouth daily. VITAMIN C 500 mg tablet Generic drug:  ascorbic acid (vitamin C) Take 500 mg by mouth daily. XANAX 0.5 mg tablet Generic drug:  ALPRAZolam  
Take  by mouth nightly as needed. XYZAL 5 mg tablet Generic drug:  levocetirizine Take 5 mg by mouth. q pm prn Follow-up Instructions Return if symptoms worsen or fail to improve. Patient Instructions Thumb Arthritis: Exercises Your Care Instructions Here are some examples of exercises for thumb arthritis. Start each exercise slowly. Ease off the exercise if you start to have pain. Your doctor or your physical or occupational therapist will tell you when you can start these exercises and which ones will work best for you. How to do the exercises Thumb IP flexion 1. Place your forearm and hand on a table with your affected thumb pointing up. 2. With your other hand, hold your thumb steady just below the joint nearest your thumbnail. 3. Bend the tip of your thumb downward, then straighten it. 4. Repeat 8 to 12 times. 5. Switch hands and repeat steps 1 through 4, even if only one thumb is sore. Thumb MP flexion 1. Place your forearm and hand on a table with your affected thumb pointing up. 2. With your other hand, hold the base of your thumb and palm steady. 3. Bend your thumb downward where it meets your palm, then straighten it. 4. Repeat 8 to 12 times. 5. Switch hands and repeat steps 1 through 4, even if only one thumb is sore. Thumb opposition 1. With your affected hand, point your fingers and thumb straight up. Your wrist should be relaxed, following the line of your fingers and thumb. 2. Touch your affected thumb to each finger, one finger at a time. This will look like an \"okay\" sign, but try to keep your other fingers straight and pointing upward as much as you can. 3. Repeat 8 to 12 times. 4. Switch hands and repeat steps 1 through 3, even if only one thumb is sore. Follow-up care is a key part of your treatment and safety. Be sure to make and go to all appointments, and call your doctor if you are having problems. It's also a good idea to know your test results and keep a list of the medicines you take. Where can you learn more? Go to http://mandy-yari.info/. Enter T451 in the search box to learn more about \"Thumb Arthritis: Exercises. \" Current as of: March 21, 2017 Content Version: 11.4 © 9826-8829 [employer]. Care instructions adapted under license by [employer] (which disclaims liability or warranty for this information). If you have questions about a medical condition or this instruction, always ask your healthcare professional. [address] any warranty or liability for your use of this information. Joint Injections: Care Instructions Your Care Instructions Joint injections are shots into a joint, such as the knee.  They may be used to put in medicines, such as pain relievers. Or they can be used to take out fluid. Sometimes the fluid is tested in a lab. This can help find the cause of a joint problem. A corticosteroid, or steroid, shot is used to reduce inflammation in tendons or joints. It is often used to treat problems such as arthritis, tendinitis, and bursitis. Steroids can be injected directly into a painful, inflamed joint. They can also help reduce inflammation of a bursa. A bursa is a sac of fluid. It cushions and lubricates areas where tendons, ligaments, skin, muscles, or bones rub against each other. A steroid shot can sometimes help with short-term pain relief when other treatments haven't worked. If steroid shots help, pain may improve for weeks or months. Follow-up care is a key part of your treatment and safety. Be sure to make and go to all appointments, and call your doctor if you are having problems. It's also a good idea to know your test results and keep a list of the medicines you take. How can you care for yourself at home? · Put ice or a cold pack on the area for 10 to 20 minutes at a time. Put a thin cloth between the ice and your skin. · Take anti-inflammatory medicines to reduce pain, swelling, or inflammation. These include ibuprofen (Advil, Motrin) and naproxen (Aleve). Read and follow all instructions on the label. · Avoid strenuous activities for several days, especially those that put stress on the area where you got the shot. · If you have dressings over the area, keep them clean and dry. You may remove them when your doctor tells you to. When should you call for help? Call your doctor now or seek immediate medical care if: 
? · You have signs of infection, such as: 
¨ Increased pain, swelling, warmth, or redness. ¨ Red streaks leading from the site. ¨ Pus draining from the site. ¨ A fever. ? Watch closely for changes in your health, and be sure to contact your doctor if you have any problems. Where can you learn more? Go to http://mandy-yari.info/. Enter N616 in the search box to learn more about \"Joint Injections: Care Instructions. \" Current as of: March 21, 2017 Content Version: 11.4 © 7801-0776 [employer]. Care instructions adapted under license by [employer] (which disclaims liability or warranty for this information). If you have questions about a medical condition or this instruction, always ask your healthcare professional. [address] any warranty or liability for your use of this information. Introducing [facility]! Lori Pillai introduces eX[employer] patient portal. Now you can access parts of your medical record, email your doctor's office, and request medication refills online. 1. In your internet browser, go to [email or web address]://[employer]. [employer] 2. Click on the First Time User? Click Here link in the Sign In box. You will see the New Member Sign Up page. 3. Enter your eXenSa Access Code exactly as it appears below. You will not need to use this code after youve completed the sign-up process. If you do not sign up before the expiration date, you must request a new code. · eXenSa Access Code: [email or web address] Expires: 7/18/2018 10:21 AM 
 
4. Enter the last four digits of your Social Security Number (xxxx) and Date of Birth (mm/dd/yyyy) as indicated and click Submit. You will be taken to the next sign-up page. 5. Create a eXenSa ID. This will be your eXenSa login ID and cannot be changed, so think of one that is secure and easy to remember. 6. Create a eXenSa password. You can change your password at any time. 7. Enter your Password Reset Question and Answer. This can be used at a later time if you forget your password. 8. Enter your e-mail address. You will receive e-mail notification when new information is available in [address] 9. Click Sign Up. You can now view and download portions of your medical record. 10. Click the Download Summary menu link to download a portable copy of your medical information. If you have questions, please visit the Frequently Asked Questions section of the [employer] website. Remember, [employer] is NOT to be used for urgent needs. For medical emergencies, dial 911. Now available from your iPhone and Android! Please provide this summary of care documentation to your next provider. Your primary care clinician is listed as Willard Santos. If you have any questions after today's visit, please call [phone].

## 2018-05-18 ENCOUNTER — HOSPITAL ENCOUNTER (OUTPATIENT)
Dept: CT IMAGING | Age: 68
Discharge: HOME | End: 2018-05-18
Attending: OTOLARYNGOLOGY
Payer: MEDICARE

## 2018-05-18 DIAGNOSIS — J32.9 CHRONIC INFECTION OF SINUS: ICD-10-CM

## 2018-05-18 PROCEDURE — 70486 CT MAXILLOFACIAL W/O DYE: CPT

## 2018-07-16 ENCOUNTER — OFFICE VISIT (OUTPATIENT)
Dept: ORTHOPEDIC SURGERY | Facility: CLINIC | Age: 68
End: 2018-07-16

## 2018-07-16 VITALS
TEMPERATURE: 97.4 F | BODY MASS INDEX: 23.83 KG/M2 | RESPIRATION RATE: 16 BRPM | SYSTOLIC BLOOD PRESSURE: 117 MMHG | DIASTOLIC BLOOD PRESSURE: 56 MMHG | WEIGHT: 139.6 LBS | HEIGHT: 64 IN | HEART RATE: 80 BPM | OXYGEN SATURATION: 99 %

## 2018-07-16 DIAGNOSIS — M25.562 CHRONIC PAIN OF LEFT KNEE: ICD-10-CM

## 2018-07-16 DIAGNOSIS — M79.642 LEFT HAND PAIN: ICD-10-CM

## 2018-07-16 DIAGNOSIS — G89.29 CHRONIC PAIN OF LEFT KNEE: ICD-10-CM

## 2018-07-16 DIAGNOSIS — M17.12 PRIMARY OSTEOARTHRITIS OF LEFT KNEE: Primary | ICD-10-CM

## 2018-07-16 RX ORDER — BUPIVACAINE HYDROCHLORIDE 2.5 MG/ML
0.5 INJECTION, SOLUTION EPIDURAL; INFILTRATION; INTRACAUDAL ONCE
Qty: 0.5 ML | Refills: 0
Start: 2018-07-16 | End: 2018-07-16

## 2018-07-16 RX ORDER — BUPIVACAINE HYDROCHLORIDE 2.5 MG/ML
4 INJECTION, SOLUTION EPIDURAL; INFILTRATION; INTRACAUDAL ONCE
Qty: 4 ML | Refills: 0
Start: 2018-07-16 | End: 2018-07-16

## 2018-07-16 RX ORDER — BETAMETHASONE SODIUM PHOSPHATE AND BETAMETHASONE ACETATE 3; 3 MG/ML; MG/ML
6 INJECTION, SUSPENSION INTRA-ARTICULAR; INTRALESIONAL; INTRAMUSCULAR; SOFT TISSUE ONCE
Qty: 0.5 ML | Refills: 0
Start: 2018-07-16 | End: 2018-07-16
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
## 2018-07-24 ENCOUNTER — HOSPITAL ENCOUNTER (OUTPATIENT)
Dept: LAB | Age: 68
Discharge: HOME | End: 2018-07-24
Payer: MEDICARE

## 2018-07-24 LAB
ALBUMIN SERPL-MCNC: 3.6 G/DL (ref 3.4–5)
ALBUMIN/GLOB SERPL: 1.1 {RATIO} (ref 0.8–1.7)
ALP SERPL-CCNC: 94 U/L (ref 45–117)
ALT SERPL-CCNC: 16 U/L (ref 13–56)
ANION GAP SERPL CALC-SCNC: 5 MMOL/L (ref 3–18)
AST SERPL-CCNC: 14 U/L (ref 15–37)
BASOPHILS # BLD: 0 K/UL (ref 0–0.1)
BASOPHILS NFR BLD: 0 % (ref 0–2)
BILIRUB SERPL-MCNC: 0.2 MG/DL (ref 0.2–1)
BUN SERPL-MCNC: 17 MG/DL (ref 7–18)
BUN/CREAT SERPL: 23 (ref 12–20)
CALCIUM SERPL-MCNC: 8.8 MG/DL (ref 8.5–10.1)
CHLORIDE SERPL-SCNC: 104 MMOL/L (ref 100–108)
CO2 SERPL-SCNC: 32 MMOL/L (ref 21–32)
CREAT SERPL-MCNC: 0.74 MG/DL (ref 0.6–1.3)
DIFFERENTIAL METHOD BLD: ABNORMAL
EOSINOPHIL # BLD: 0.1 K/UL (ref 0–0.4)
EOSINOPHIL NFR BLD: 1 % (ref 0–5)
ERYTHROCYTE [DISTWIDTH] IN BLOOD BY AUTOMATED COUNT: 12.9 % (ref 11.6–14.5)
GLOBULIN SER CALC-MCNC: 3.2 G/DL (ref 2–4)
GLUCOSE SERPL-MCNC: 102 MG/DL (ref 74–99)
HCT VFR BLD AUTO: 36.6 % (ref 35–45)
HGB BLD-MCNC: 11.6 G/DL (ref 12–16)
LYMPHOCYTES # BLD: 2.4 K/UL (ref 0.9–3.6)
LYMPHOCYTES NFR BLD: 31 % (ref 21–52)
MCH RBC QN AUTO: 28.9 PG (ref 24–34)
MCHC RBC AUTO-ENTMCNC: 31.7 G/DL (ref 31–37)
MCV RBC AUTO: 91.3 FL (ref 74–97)
MONOCYTES # BLD: 0.7 K/UL (ref 0.05–1.2)
MONOCYTES NFR BLD: 9 % (ref 3–10)
NEUTS SEG # BLD: 4.4 K/UL (ref 1.8–8)
NEUTS SEG NFR BLD: 59 % (ref 40–73)
PLATELET # BLD AUTO: 327 K/UL (ref 135–420)
PMV BLD AUTO: 8.8 FL (ref 9.2–11.8)
POTASSIUM SERPL-SCNC: 4.1 MMOL/L (ref 3.5–5.5)
PROT SERPL-MCNC: 6.8 G/DL (ref 6.4–8.2)
RBC # BLD AUTO: 4.01 M/UL (ref 4.2–5.3)
SODIUM SERPL-SCNC: 141 MMOL/L (ref 136–145)
WBC # BLD AUTO: 7.6 K/UL (ref 4.6–13.2)

## 2018-07-24 PROCEDURE — 85025 COMPLETE CBC W/AUTO DIFF WBC: CPT | Performed by: OTOLARYNGOLOGY

## 2018-07-24 PROCEDURE — 80053 COMPREHEN METABOLIC PANEL: CPT | Performed by: OTOLARYNGOLOGY

## 2018-07-24 PROCEDURE — 36415 COLL VENOUS BLD VENIPUNCTURE: CPT | Performed by: OTOLARYNGOLOGY

## 2018-08-21 ENCOUNTER — HOSPITAL ENCOUNTER (OUTPATIENT)
Dept: MAMMOGRAPHY | Age: 68
Discharge: HOME | End: 2018-08-21
Attending: PHYSICIAN ASSISTANT
Payer: MEDICARE

## 2018-08-21 DIAGNOSIS — Z12.31 VISIT FOR SCREENING MAMMOGRAM: ICD-10-CM

## 2018-08-21 PROCEDURE — 77067 SCR MAMMO BI INCL CAD: CPT

## 2018-10-12 ENCOUNTER — OFFICE VISIT (OUTPATIENT)
Dept: ORTHOPEDIC SURGERY | Facility: CLINIC | Age: 68
End: 2018-10-12

## 2018-10-12 VITALS
BODY MASS INDEX: 24.07 KG/M2 | WEIGHT: 141 LBS | TEMPERATURE: 98 F | OXYGEN SATURATION: 97 % | DIASTOLIC BLOOD PRESSURE: 60 MMHG | RESPIRATION RATE: 16 BRPM | HEIGHT: 64 IN | SYSTOLIC BLOOD PRESSURE: 109 MMHG | HEART RATE: 84 BPM

## 2018-10-12 DIAGNOSIS — M25.562 CHRONIC PAIN OF LEFT KNEE: ICD-10-CM

## 2018-10-12 DIAGNOSIS — G89.29 CHRONIC PAIN OF LEFT KNEE: ICD-10-CM

## 2018-10-12 DIAGNOSIS — M17.12 PRIMARY OSTEOARTHRITIS OF LEFT KNEE: Primary | ICD-10-CM

## 2018-10-12 RX ORDER — BETAMETHASONE SODIUM PHOSPHATE AND BETAMETHASONE ACETATE 3; 3 MG/ML; MG/ML
6 INJECTION, SUSPENSION INTRA-ARTICULAR; INTRALESIONAL; INTRAMUSCULAR; SOFT TISSUE ONCE
Qty: 0.5 ML | Refills: 0
Start: 2018-10-12 | End: 2018-10-12

## 2018-10-12 RX ORDER — BUPIVACAINE HYDROCHLORIDE 2.5 MG/ML
4 INJECTION, SOLUTION EPIDURAL; INFILTRATION; INTRACAUDAL ONCE
Qty: 4 ML | Refills: 0
Start: 2018-10-12 | End: 2018-10-12

## 2018-10-12 NOTE — PATIENT INSTRUCTIONS
Knee Arthritis: Exercises Your Care Instructions Here are some examples of exercises for knee arthritis. Start each exercise slowly. Ease off the exercise if you start to have pain. Your doctor or physical therapist will tell you when you can start these exercises and which ones will work best for you. How to do the exercises Knee flexion with heel slide 1. Lie on your back with your knees bent. 2. Slide your heel back by bending your affected knee as far as you can. Then hook your other foot around your ankle to help pull your heel even farther back. 3. Hold for about 6 seconds, then rest for up to 10 seconds. 4. Repeat 8 to 12 times. 5. Switch legs and repeat steps 1 through 4, even if only one knee is sore. TierPM 1. Sit with your affected leg straight and supported on the floor or a firm bed. Place a small, rolled-up towel under your knee. Your other leg should be bent, with that foot flat on the floor. 2. Tighten the thigh muscles of your affected leg by pressing the back of your knee down into the towel. 3. Hold for about 6 seconds, then rest for up to 10 seconds. 4. Repeat 8 to 12 times. 5. Switch legs and repeat steps 1 through 4, even if only one knee is sore. Straight-leg raises to the front 1. Lie on your back with your good knee bent so that your foot rests flat on the floor. Your affected leg should be straight. Make sure that your low back has a normal curve. You should be able to slip your hand in between the floor and the small of your back, with your palm touching the floor and your back touching the back of your hand. 2. Tighten the thigh muscles in your affected leg by pressing the back of your knee flat down to the floor. Hold your knee straight. 3. Keeping the thigh muscles tight and your leg straight, lift your affected leg up so that your heel is about 12 inches off the floor. Hold for about 6 seconds, then lower slowly. 4. Relax for up to 10 seconds between repetitions. 5. Repeat 8 to 12 times. 6. Switch legs and repeat steps 1 through 5, even if only one knee is sore. Active knee flexion 1. Lie on your stomach with your knees straight. If your kneecap is uncomfortable, roll up a washcloth and put it under your leg just above your kneecap. 2. Lift the foot of your affected leg by bending the knee so that you bring the foot up toward your buttock. If this motion hurts, try it without bending your knee quite as far. This may help you avoid any painful motion. 3. Slowly move your leg up and down. 4. Repeat 8 to 12 times. 5. Switch legs and repeat steps 1 through 4, even if only one knee is sore. Quadriceps stretch (facedown) 1. Lie flat on your stomach, and rest your face on the floor. 2. Wrap a towel or belt strap around the lower part of your affected leg. Then use the towel or belt strap to slowly pull your heel toward your buttock until you feel a stretch. 3. Hold for about 15 to 30 seconds, then relax your leg against the towel or belt strap. 4. Repeat 2 to 4 times. 5. Switch legs and repeat steps 1 through 4, even if only one knee is sore. Stationary exercise bike 1. If you do not have a stationary exercise bike at home, you can find one to ride at your local health club or community center. 2. Adjust the height of the bike seat so that your knee is slightly bent when your leg is extended downward. If your knee hurts when the pedal reaches the top, you can raise the seat so that your knee does not bend as much. 3. Start slowly. At first, try to do 5 to 10 minutes of cycling with little to no resistance. Then increase your time and the resistance bit by bit until you can do 20 to 30 minutes without pain. 4. If you start to have pain, rest your knee until your pain gets back to the level that is normal for you. Or cycle for less time or with less effort. Follow-up care is a key part of your treatment and safety. Be sure to make and go to all appointments, and call your doctor if you are having problems. It's also a good idea to know your test results and keep a list of the medicines you take. Where can you learn more? Go to http://mandy-yari.info/. Enter C159 in the search box to learn more about \"Knee Arthritis: Exercises. \" Current as of: November 29, 2017 Content Version: 11.8 © 2810-2956 [employer]wise[employer]. Care instructions adapted under license by [employer] (which disclaims liability or warranty for this information). If you have questions about a medical condition or this instruction, always ask your healthcare professional. [address] any warranty or liability for your use of this information.
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
## 2018-11-23 ENCOUNTER — HOSPITAL ENCOUNTER (OUTPATIENT)
Dept: BONE DENSITY | Age: 68
Discharge: HOME | End: 2018-11-23
Attending: PHYSICIAN ASSISTANT
Payer: MEDICARE

## 2018-11-23 DIAGNOSIS — M94.9 DISORDER OF BONE AND CARTILAGE: ICD-10-CM

## 2018-11-23 DIAGNOSIS — M89.9 DISORDER OF BONE AND CARTILAGE: ICD-10-CM

## 2018-11-23 PROCEDURE — 77080 DXA BONE DENSITY AXIAL: CPT

## 2019-03-21 ENCOUNTER — OFFICE VISIT (OUTPATIENT)
Dept: ORTHOPEDIC SURGERY | Facility: CLINIC | Age: 69
End: 2019-03-21

## 2019-03-21 VITALS
DIASTOLIC BLOOD PRESSURE: 53 MMHG | SYSTOLIC BLOOD PRESSURE: 107 MMHG | HEIGHT: 64 IN | TEMPERATURE: 98 F | RESPIRATION RATE: 16 BRPM | HEART RATE: 84 BPM | OXYGEN SATURATION: 96 % | BODY MASS INDEX: 23.25 KG/M2 | WEIGHT: 136.2 LBS

## 2019-03-21 DIAGNOSIS — M25.562 CHRONIC PAIN OF LEFT KNEE: ICD-10-CM

## 2019-03-21 DIAGNOSIS — M79.642 LEFT HAND PAIN: ICD-10-CM

## 2019-03-21 DIAGNOSIS — M18.12 PRIMARY OSTEOARTHRITIS OF FIRST CARPOMETACARPAL JOINT OF ONE HAND, LEFT: ICD-10-CM

## 2019-03-21 DIAGNOSIS — G89.29 CHRONIC PAIN OF LEFT KNEE: ICD-10-CM

## 2019-03-21 DIAGNOSIS — M17.12 PRIMARY OSTEOARTHRITIS OF LEFT KNEE: Primary | ICD-10-CM

## 2019-03-21 RX ORDER — BUPIVACAINE HYDROCHLORIDE 2.5 MG/ML
4 INJECTION, SOLUTION EPIDURAL; INFILTRATION; INTRACAUDAL ONCE
Qty: 4 ML | Refills: 0
Start: 2019-03-21 | End: 2019-03-21

## 2019-03-21 RX ORDER — TRIAMCINOLONE ACETONIDE 40 MG/ML
40 INJECTION, SUSPENSION INTRA-ARTICULAR; INTRAMUSCULAR ONCE
Qty: 0.5 ML | Refills: 0
Start: 2019-03-21 | End: 2019-03-21

## 2019-03-21 RX ORDER — BUPIVACAINE HYDROCHLORIDE 2.5 MG/ML
0.5 INJECTION, SOLUTION EPIDURAL; INFILTRATION; INTRACAUDAL ONCE
Qty: 0.5 ML | Refills: 0
Start: 2019-03-21 | End: 2019-03-21
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
## 2019-03-21 NOTE — PROGRESS NOTES
1. Have you been to the ER, urgent care clinic since your last visit? Hospitalized since your last visit? No 
 
2. Have you seen or consulted any other health care providers outside of the [address] [place] [address] since your last visit? Include any pap smears or colon screening.  No

## 2019-03-21 NOTE — PATIENT INSTRUCTIONS
Learning About Arthritis at the [facility] of the Thumb What is it? Arthritis at the base of the thumb joint is wear and tear on the cartilage. Cartilage is a firm, thick, slippery tissue. It covers and protects the ends of bones where they meet to form a joint. When you have arthritis, there are changes in the cartilage that cause it to break down. The bones rub together and cause joint damage and pain. What causes it? Experts don't know what causes arthritis at the base of the thumb. But aging, a lot of use, an injury, or family history may play a part. What are the symptoms? Symptoms of arthritis at the base of the thumb include aching in your joint. Or the pain may feel burning or sharp. You may feel clicking, creaking, or catching in the joint. It may get stiff. You may have more pain and less strength when you pinch or  things. Symptoms may come and go, stay the same, or get worse over time. How is it diagnosed? Your doctor can often diagnose arthritis by asking you questions about your joint pain and other symptoms and examining you. You may also have X-rays and blood tests. Blood tests can help make sure another disease isn't causing your symptoms. How is it treated? Arthritis at the base of your thumb may be treated with rest, pain relievers, steroid medicines, using a brace or splint, andin some casessurgery. To help relieve pain in the joint, rest your sore hand. Switch hands for some activities. You can try heat and cold therapy, such as hot compresses, paraffin wax, cold packs, or ice massage. Your doctor may give you a splint to wear during some activities or when pain flares up. You can often manage mild or moderate arthritis pain with over-the-counter pain relievers. These include medicines that reduce swelling, such as ibuprofen or naproxen. You can also use acetaminophen. Sometimes these medicines are in creams that you can rub on your thumb and hand.  Your doctor may also prescribe other medicine for your pain. For some people, steroid shots may be an option. If none of the treatments work, your doctor may discuss surgery with you. Follow-up care is a key part of your treatment and safety. Be sure to make and go to all appointments, and call your doctor if you are having problems. It's also a good idea to know your test results and keep a list of the medicines you take. Where can you learn more? Go to http://mandy-yari.info/. Enter T110 in the search box to learn more about \"Learning About Arthritis at the [facility] of the Thumb. \" Current as of: Maya 10, 2018 Content Version: 11.9 © 9834-3307 [employer]. Care instructions adapted under license by [employer] (which disclaims liability or warranty for this information). If you have questions about a medical condition or this instruction, always ask your healthcare professional. [place] [address] any warranty or liability for your use of this information. Thumb Arthritis: Exercises Your Care Instructions Here are some examples of exercises for thumb arthritis. Start each exercise slowly. Ease off the exercise if you start to have pain. Your doctor or your physical or occupational therapist will tell you when you can start these exercises and which ones will work best for you. How to do the exercises Thumb IP flexion 1. Place your forearm and hand on a table with your affected thumb pointing up. 2. With your other hand, hold your thumb steady just below the joint nearest your thumbnail. 3. Bend the tip of your thumb downward, then straighten it. 4. Repeat 8 to 12 times. 5. Switch hands and repeat steps 1 through 4, even if only one thumb is sore. Thumb MP flexion 1. Place your forearm and hand on a table with your affected thumb pointing up. 2. With your other hand, hold the base of your thumb and palm steady. 3. Bend your thumb downward where it meets your palm, then straighten it. 4. Repeat 8 to 12 times. 5. Switch hands and repeat steps 1 through 4, even if only one thumb is sore. Thumb opposition 1. With your affected hand, point your fingers and thumb straight up. Your wrist should be relaxed, following the line of your fingers and thumb. 2. Touch your affected thumb to each finger, one finger at a time. This will look like an \"okay\" sign, but try to keep your other fingers straight and pointing upward as much as you can. 3. Repeat 8 to 12 times. 4. Switch hands and repeat steps 1 through 3, even if only one thumb is sore. Follow-up care is a key part of your treatment and safety. Be sure to make and go to all appointments, and call your doctor if you are having problems. It's also a good idea to know your test results and keep a list of the medicines you take. Where can you learn more? Go to http://mandy-yari.info/. Enter V304 in the search box to learn more about \"Thumb Arthritis: Exercises. \" Current as of: September 20, 2018 Content Version: 11.9 © 6466-2957 [employer]. Care instructions adapted under license by [employer] (which disclaims liability or warranty for this information). If you have questions about a medical condition or this instruction, always ask your healthcare professional. [address] any warranty or liability for your use of this information.

## 2019-04-12 ENCOUNTER — HOSPITAL ENCOUNTER (OUTPATIENT)
Age: 69
Discharge: HOME | End: 2019-04-12
Attending: PHYSICIAN ASSISTANT
Payer: MEDICARE

## 2019-04-12 DIAGNOSIS — H91.90 HEARING LOSS: ICD-10-CM

## 2019-04-12 LAB — CREAT UR-MCNC: 0.8 MG/DL (ref 0.6–1.3)

## 2019-04-12 PROCEDURE — 70553 MRI BRAIN STEM W/O & W/DYE: CPT

## 2019-04-12 PROCEDURE — A9575 INJ GADOTERATE MEGLUMI 0.1ML: HCPCS | Performed by: PHYSICIAN ASSISTANT

## 2019-04-12 PROCEDURE — 82565 ASSAY OF CREATININE: CPT

## 2019-04-12 PROCEDURE — 74011636320 HC RX REV CODE- 636/320: Performed by: PHYSICIAN ASSISTANT

## 2019-04-12 RX ADMIN — GADOTERATE MEGLUMINE 12 ML: 376.9 INJECTION INTRAVENOUS at 17:00

## 2019-05-15 ENCOUNTER — HOSPITAL ENCOUNTER (OUTPATIENT)
Dept: PHYSICAL THERAPY | Age: 69
Discharge: HOME | End: 2019-05-15
Payer: MEDICARE

## 2019-05-15 PROCEDURE — 97161 PT EVAL LOW COMPLEX 20 MIN: CPT

## 2019-05-15 PROCEDURE — 97530 THERAPEUTIC ACTIVITIES: CPT

## 2019-05-15 NOTE — PROGRESS NOTES
PT DAILY TREATMENT NOTE 10-18 Patient Name: Slime Samano Date:5/15/2019 : 1950 [x]  Patient  Verified Payor: [place] MEDICARE / Plan: Shun Mustafa / Product Type: Medicare / In time:1205  Out time:1250 Total Treatment Time (min): 45 Visit #: 1 of 10-12 Medicare/BCBS Only Total Timed Codes (min):  45 1:1 Treatment Time:  25 Treatment Area: Labyrinthine dysfunction, left ear [H83.2X2] SUBJECTIVE Pain Level (0-10 scale): 0/10 Any medication changes, allergies to medications, adverse drug reactions, diagnosis change, or new procedure performed?: [x] No    [] Yes (see summary sheet for update) Subjective functional status/changes:   [] No changes reported Onset: 4 months ago gradual. Stand up with dizziness. Getting worst now. \" Reports Swwimmy feeliing \" Dizziness lasts seconds. Feels like passing out. \"a white sheet\" Blood work and ck ups mormal. New glasses prescription. Difficulty with shopping looking aroud, head turns. OBJECTIVE Eval : 20 mins 25 min Therapeutic Activity:  []  See flow sheet :  
Rationale: increase ROM, increase strength and improve coordination  to improve the patients ability to determine level of dizziness and stability. With 
 [] TE 
 [] TA 
 [] neuro 
 [] other: Patient Education: [x] Review HEP [] Progressed/Changed HEP based on:  
[] positioning   [] body mechanics   [] transfers   [] heat/ice application   
[] other:   
 
Other Objective/Functional Measuresocculomotor test Normal, LE strength =WNL. Romberg EC with sway, no lob. DGI=16/24 Vor x 1 with fair tolrerance. Dizziness increases with head turns,. Unstable walking straight line. Pain Level (0-10 scale) post treatment: 0/10 ASSESSMENT/Changes in Function: see poc Patient will continue to benefit from skilled PT services to modify and progress therapeutic interventions, address functional mobility deficits, address ROM deficits, address strength deficits, analyze and address soft tissue restrictions, analyze and cue movement patterns, analyze and modify body mechanics/ergonomics, assess and modify postural abnormalities and address imbalance/dizziness to attain remaining goals. [x]  See Plan of Care 
[]  See progress note/recertification 
[]  See Discharge Summary Progress towards goals / Updated goals: 
See poc PLAN [x]  Upgrade activities as tolerated     [x]  Continue plan of care 
[]  Update interventions per flow sheet      
[]  Discharge due to:_ 
[]  Other:_ Katerina Santiago, PT 5/15/2019  12:19 PM 
 
No future appointments.

## 2019-05-16 NOTE — PROGRESS NOTES
[facility] [address] [facility] 
[phone] fax Plan of Care/ Statement of Necessity for Physical Therapy Services Patient name: Rome León Start of Care: 2019 Referral source: Jono De La Rosa MD : 1950 Medical Diagnosis: Labyrinthine dysfunction, left ear [H83.2X2] Payor: [place] MEDICARE / Plan: [address] [place] [address] / Product Type: Medicare /  Onset Date:4 months ago Treatment Diagnosis: Dizziness Prior Hospitalization: see medical history Provider#: [id number] Medications: Verified on Patient summary List  
 Comorbidities: Arthritis, Hearing Impaired, Anxiety. Prior Level of Function: Independent. The Plan of Care and following information is based on the information from the initial evaluation. Assessment/ key information: Pt is a 76 yr old female who reports onset of dizziness 4 months ago that began during transfers, sit to stand. PT had VNG testing done with a finding of 27% Right Unilateral Weakness. She reports difficulty with turns, walking a straight line, shopping activities. She stated the dizziness lasts seconds mostly. She denies tinnitus of FERGUSON's. Romberg EC with AP sway, Performs head turns with onset of dizziness and looses balance during gait with head turns mostly vertical. Occulomotor Tests =WNL., DHI=26%, DGI=. SOT will be performed at follow up visit. Pt will benefit from skilled Therapy to improve sx of dizziness and imbalance and to improve QOL to return to OF. Evaluation Complexity History MEDIUM  Complexity : 1-2 comorbidities / personal factors will impact the outcome/ POC ; Examination MEDIUM Complexity : 3 Standardized tests and measures addressing body structure, function, activity limitation and / or participation in recreation  ;Presentation LOW Complexity : Stable, uncomplicated  ;Clinical Decision Making MEDIUM Complexity : FOTO score of 26-74 Overall Complexity Rating: LOW Problem List: pain affecting function, decrease ROM, decrease strength, impaired gait/ balance, decrease ADL/ functional abilitiies, decrease activity tolerance, decrease flexibility/ joint mobility and decrease transfer abilities Treatment Plan may include any combination of the following: Therapeutic exercise, Therapeutic activities, Neuromuscular re-education, Physical agent/modality, Gait/balance training, Manual therapy, Patient education, Self Care training, Functional mobility training, Home safety training and Stair training Patient / Family readiness to learn indicated by: asking questions, trying to perform skills and interest 
Persons(s) to be included in education: patient (P) Barriers to Learning/Limitations: None Patient Goal (s): Sergey Medico Patient Self Reported Health Status: excellent Rehabilitation Potential: good Short Term Goals: To be accomplished in 1 weeks: 1. Pt will be compliant with a HEP to improve function. 2. Pt will perform SOT to determine Level of sensory Input. Long Term Goals: To be accomplished in 8 weeks: 1. Pt will increase FOTO score by 7    pts to ease with ADL's. 
 2. Pt will increase DGI to >18/24 to ease with safety during ambulation. 3. Pt will report >75% improvement in sx to return to ADL's and PLOF. 4. Pt will decrease DHI by 10% to improve sx of dizziness. Frequency / Duration: Patient to be seen 2 times per week for 8 weeks. Patient/ Caregiver education and instruction: Diagnosis, prognosis, self care, activity modification and exercises 
 [x]  Plan of care has been reviewed with PTA Certification Period: 5/15/19 - 6/14/19 Daisy Whitaker, PT 5/16/2019 2:30 PM 
 
________________________________________________________________________ I certify that the above Therapy Services are being furnished while the patient is under my care.  I agree with the treatment plan and certify that this therapy is necessary. [de-identified] Signature:____________Date:_________TIME:________ 
 
[employer], Date and Time must be completed for valid certification ** Please sign and return to [address] [facility] 
[phone] fax

## 2019-05-20 ENCOUNTER — HOSPITAL ENCOUNTER (OUTPATIENT)
Dept: PHYSICAL THERAPY | Age: 69
Discharge: HOME | End: 2019-05-20
Payer: MEDICARE

## 2019-05-20 PROCEDURE — 97112 NEUROMUSCULAR REEDUCATION: CPT

## 2019-05-20 NOTE — PROGRESS NOTES
PT DAILY TREATMENT NOTE 10-18 Patient Name: April Hogan Date:2019 : 1950 [x]  Patient  Verified Payor: [place] MED[place]ARE / Plan: Shun Nathy / Product Type: Medicare / In time:300  Out time:329 Total Treatment Time (min): 29 Visit #: 2 of 16 Medicare/BCBS Only Total Timed Codes (min):  29 1:1 Treatment Time:  34 Treatment Area: Dizziness and giddiness [R42] SUBJECTIVE Pain Level (0-10 scale): 7/10 Any medication changes, allergies to medications, adverse drug reactions, diagnosis change, or new procedure performed?: [x] No    [] Yes (see summary sheet for update) Subjective functional status/changes:   [] No changes reported Pt stated that she has some dizziness today, about like last time OBJECTIVE 29 min Neuromuscular Re-education:  [x]  See flow sheet :  
Rationale: improve coordination, improve balance and increase proprioception  to improve the patients ability to decrease fall risk With 
 [x] TE 
 [] TA [x] neuro 
 [] other: Patient Education: [x] Review HEP [] Progressed/Changed HEP based on:  
[] positioning   [] body mechanics   [] transfers   [] heat/ice application   
[] other:   
 
Other Objective/Functional Measures:  
Had difficulty with slow marches No increased dizziness with exercises Tandem ambulation was challenging All EC static balance was challenging Pain Level (0-10 scale) post treatment: 7/10 ASSESSMENT/Changes in Function:  
Initiated therex today per flow sheet. Pt reported compliance with HEP. Pt put forth good effort with all exercises Patient will continue to benefit from skilled PT services to address functional mobility deficits, address ROM deficits and address imbalance/dizziness to attain remaining goals. [x]  See Plan of Care 
[]  See progress note/recertification 
[]  See Discharge Summary Progress towards goals / Updated goals: 
Short Term Goals: To be accomplished in 1 weeks: 1. Pt will be compliant with a HEP to improve function. Goal met. 5/20/19 2. Pt will perform SOT to determine Level of sensory Input. Long Term Goals: To be accomplished in 8 weeks: 1. Pt will increase FOTO score by 7    pts to ease with ADL's. 
            2. Pt will increase DGI to >18/24 to ease with safety during ambulation. 3. Pt will report >75% improvement in sx to return to ADL's and PLOF. 4. Pt will decrease DHI by 10% to improve sx of dizziness. PLAN 
[]  Upgrade activities as tolerated     [x]  Continue plan of care 
[]  Update interventions per flow sheet      
[]  Discharge due to:_ 
[]  Other:_ Gera Barreto PTA 5/20/2019  3:05 PM 
 
Future Appointments Date Time Provider Dewayne Gamboa 5/24/2019  1:30 PM Mario Hartman, [facility]  
5/29/2019  2:30 PM Nella Castro PTA [facility]  
5/31/2019  2:00 PM Nella Castro PTA [facility]  
6/3/2019 11:00 AM Nella Castro PTA [facility]  
6/5/2019 12:00 PM Mario Hartman, [facility]  
6/10/2019 10:30 AM [facility]  
6/13/2019  4:00 PM [facility]  
6/17/2019 10:30 AM Mario Hartman PT [facility]  
6/19/2019 12:00 PM Bárbara aVlladares PT [facility]

## 2019-05-24 ENCOUNTER — HOSPITAL ENCOUNTER (OUTPATIENT)
Dept: PHYSICAL THERAPY | Age: 69
Discharge: HOME | End: 2019-05-24
Payer: MEDICARE

## 2019-05-24 PROCEDURE — 97530 THERAPEUTIC ACTIVITIES: CPT

## 2019-05-24 PROCEDURE — 97112 NEUROMUSCULAR REEDUCATION: CPT

## 2019-05-24 NOTE — PROGRESS NOTES
1PT DAILY TREATMENT NOTE 10-18    Patient Name: Dory Willis  Date:2019  : 1950  [x]  Patient  Verified  Payor: [place] MEDICARE / Plan: [place] MEDICARE PART A & B / Product Type: Medicare /    In time:131  Out time:215  Total Treatment Time (min): 46  Visit #: 3 of 16    Medicare/BCBS Only   Total Timed Codes (min):  46 1:1 Treatment Time:  38       Treatment Area: Dizziness and giddiness [R42]    SUBJECTIVE  Pain Level (0-10 scale): 10  Any medication changes, allergies to medications, adverse drug reactions, diagnosis change, or new procedure performed?: [x] No    [] Yes (see summary sheet for update)  Subjective functional status/changes:   [] No changes reported  Not sure if she is making progress. OBJECTIVE      30 min Therapeutic Activity:  []  See flow sheet :   Rationale: increase ROM, increase strength, improve coordination and improve balance  to improve the patients ability to ease with ADl's. 15 min Neuromuscular Re-education:  []  See flow sheet :   Rationale: improve coordination, improve balance and increase proprioception  to improve the patients ability to ease with ADL's       With   [] TE   [] TA   [] neuro   [] other: Patient Education: [x] Review HEP    [] Progressed/Changed HEP based on:   [] positioning   [] body mechanics   [] transfers   [] heat/ice application    [] other:      Other Objective/Functional Measures: SOT=73 All Sensory Input WNL. Corrected VORx1      Pain Level (0-10 scale) post treatment: 5/10    ASSESSMENT/Changes in Function: progressing well clinically. Pt required correction for VOR x1 ex's fo HEP. She will continue to work on ambulation tolerance in a straight path.      Patient will continue to benefit from skilled PT services to modify and progress therapeutic interventions, address functional mobility deficits, address ROM deficits, address strength deficits, analyze and address soft tissue restrictions, analyze and cue movement patterns, analyze and modify body mechanics/ergonomics, assess and modify postural abnormalities and address imbalance/dizziness to attain remaining goals. [x]  See Plan of Care  []  See progress note/recertification  []  See Discharge Summary         Progress towards goals / Updated goals:  Short Term Goals: To be accomplished in 1 weeks:              1. Pt will be compliant with a HEP to improve function. Goal met. 5/20/19              2. Pt will perform SOT to determine Level of sensory Input.    MET. 5/24/19  Long Term Goals: To be accomplished in 8 weeks:              5. Pt will increase FOTO score by 7    pts to ease with ADL's.              2. Pt will increase DGI to >18/24 to ease with safety during ambulation.               3. Pt will report >75% improvement in sx to return to ADL's and PLOF.                4. Pt will decrease DHI by 10% to improve sx of dizziness.        PLAN  [x]  Upgrade activities as tolerated     [x]  Continue plan of care  []  Update interventions per flow sheet       []  Discharge due to:_  []  Other:_      Annette Warren, PT 5/24/2019  9:57 AM    Future Appointments   Date Time Provider Dewayne Gamboa   5/24/2019  1:30 PM Marky Rueda, PT [facility]   5/29/2019  2:30 PM Mary Yoder PTA [facility]   5/31/2019  2:00 PM Mary Yoder PTA [facility]   6/3/2019 11:00 AM Mary Yoder PTA [facility]   6/5/2019 12:00 PM Marky Rueda PT [facility]   6/10/2019 10:30 AM Marky Rueda, PT [facility]   6/13/2019  4:00 PM Dane Crow PT [facility]   6/17/2019 10:30 AM Marky Rueda PT [facility]   6/19/2019 12:00 PM Dane Crow, PT [facility]

## 2019-05-29 ENCOUNTER — HOSPITAL ENCOUNTER (OUTPATIENT)
Dept: PHYSICAL THERAPY | Age: 69
Discharge: HOME | End: 2019-05-29
Payer: MEDICARE

## 2019-05-29 PROCEDURE — 97112 NEUROMUSCULAR REEDUCATION: CPT

## 2019-05-29 NOTE — PROGRESS NOTES
PT DAILY TREATMENT NOTE 10-18    Patient Name: Arcelia Chen  Date:2019  : 1950  [x]  Patient  Verified  Payor: [place] MEDICARE / Plan: [place] MEDICARE PART A & B / Product Type: Medicare /    In time:230  Out time:255  Total Treatment Time (min): 25  Visit #: 4 of 16    Medicare/BCBS Only   Total Timed Codes (min):  25 1:1 Treatment Time:  25       Treatment Area: Dizziness and giddiness [R42]    SUBJECTIVE  Pain Level (0-10 scale): 5/10  Any medication changes, allergies to medications, adverse drug reactions, diagnosis change, or new procedure performed?: [x] No    [] Yes (see summary sheet for update)  Subjective functional status/changes:   [] No changes reported  Pt stated that her dizziness is about the same    OBJECTIVE    25 min Neuromuscular Re-education:  [x]  See flow sheet :   Rationale: improve coordination, improve balance and increase proprioception  to improve the patients ability to decrease fall risk    With   [] TE   [] TA   [x] neuro   [] other: Patient Education: [x] Review HEP    [] Progressed/Changed HEP based on:   [] positioning   [] body mechanics   [] transfers   [] heat/ice application    [] other:      Other Objective/Functional Measures:   Had increased dizziness with HT activities  Slight LOB with Romberg on foam with EC  Mild difficulty with tandem walks     Pain Level (0-10 scale) post treatment: 5/10    ASSESSMENT/Changes in Function:   Pt is making slow progress toward goals. Pt had decrease in dizziness that is caused by exercises today. Cont with decreased static and dynamic balance. Patient will continue to benefit from skilled PT services to modify and progress therapeutic interventions, address functional mobility deficits and address imbalance/dizziness to attain remaining goals.      [x]  See Plan of Care  []  See progress note/recertification  []  See Discharge Summary         Progress towards goals / Updated goals:  Short Term Goals: To be accomplished in 1 weeks:              5. Pt will be compliant with a HEP to improve function.   Goal met. 5/20/19              2. Pt will perform SOT to determine Level of sensory Input.    MET. 5/24/19  Long Term Goals: To be accomplished in 8 weeks:              0. Pt will increase FOTO score by 7    pts to ease with ADL's.              2. Pt will increase DGI to >18/24 to ease with safety during ambulation.               3. Pt will report >75% improvement in sx to return to ADL's and PLOF.                4. Pt will decrease DHI by 10% to improve sx of dizziness.     PLAN  []  Upgrade activities as tolerated     [x]  Continue plan of care  []  Update interventions per flow sheet       []  Discharge due to:_  []  Other:_      Alondra Muñoz PTA 5/29/2019  2:34 PM    Future Appointments   Date Time Provider Dewayne Gamboa   5/31/2019  2:00 PM Ashli Dee [facility]   6/3/2019 11:00 AM Tammy Rand PTA [facility]   6/5/2019 12:00 PM Lisa Montenegro, PT [facility]   6/10/2019 10:30 AM Lisa Montenegro PT [facility]   6/13/2019  4:00 PM Micah Lynn PT [facility]   6/17/2019 10:30 AM Lisa Montenegro PT [facility]   6/19/2019 12:00 PM Micah Lynn PT [facility]

## 2019-05-31 ENCOUNTER — APPOINTMENT (OUTPATIENT)
Dept: PHYSICAL THERAPY | Age: 69
End: 2019-05-31
Payer: MEDICARE

## 2019-06-03 ENCOUNTER — HOSPITAL ENCOUNTER (OUTPATIENT)
Dept: PHYSICAL THERAPY | Age: 69
Discharge: HOME | End: 2019-06-03
Payer: MEDICARE

## 2019-06-03 PROCEDURE — 97112 NEUROMUSCULAR REEDUCATION: CPT

## 2019-06-03 NOTE — PROGRESS NOTES
PT DAILY TREATMENT NOTE 10-18    Patient Name: April Hogan  Date:6/3/2019  : 1950  [x]  Patient  Verified  Payor: [place] MEDICARE / Plan: [place] MEDICARE PART A & B / Product Type: Medicare /    In time:1100  Out time:1130  Total Treatment Time (min): 30  Visit #: 5 of 16    Medicare/BCBS Only   Total Timed Codes (min):  30 1:1 Treatment Time:  30       Treatment Area: Dizziness and giddiness [R42]    SUBJECTIVE  Pain Level (0-10 scale): 8/10  Any medication changes, allergies to medications, adverse drug reactions, diagnosis change, or new procedure performed?: [x] No    [] Yes (see summary sheet for update)  Subjective functional status/changes:   [] No changes reported  Pt stated that she has increased dizziness today    OBJECTIVE    30 min Neuromuscular Re-education:  [x]  See flow sheet :   Rationale: improve coordination, improve balance and increase proprioception  to improve the patients ability to decrease fall risk    With   [x] TE   [] TA   [] neuro   [] other: Patient Education: [x] Review HEP    [] Progressed/Changed HEP based on:   [] positioning   [] body mechanics   [] transfers   [] heat/ice application    [] other:      Other Objective/Functional Measures:   Had difficulty with EC static balance today  No increased dizziness with VOR exercise  Tandem ambulation was very challenging today and pt had multiple mis-steps, but no LOB  Had significant increase in dizziness with backward walking     Pain Level (0-10 scale) post treatment: 8/10    ASSESSMENT/Changes in Function:   Pt is making limited progress toward goals. Pt reported feeling as though one of her medications is making her dizzy. Pt is going to make appt with MD to discuss medication. Static balance is improving.  Pt cont to have some difficulty with EC static balance and most dynamic balance    Patient will continue to benefit from skilled PT services to modify and progress therapeutic interventions, address functional mobility deficits, analyze and cue movement patterns, address imbalance/dizziness and instruct in home and community integration to attain remaining goals. [x]  See Plan of Care  []  See progress note/recertification  []  See Discharge Summary         Progress towards goals / Updated goals:  Short Term Goals: To be accomplished in 1 weeks:              1. Pt will be compliant with a HEP to improve function.   Goal met. 5/20/19              2. Pt will perform SOT to determine Level of sensory Input.    MET. 5/24/19  Long Term Goals: To be accomplished in 8 weeks:              7. Pt will increase FOTO score by 7    pts to ease with ADL's.              2. Pt will increase DGI to >18/24 to ease with safety during ambulation.               3. Pt will report >75% improvement in sx to return to ADL's and PLOF.                4. Pt will decrease DHI by 10% to improve sx of dizziness.     PLAN  []  Upgrade activities as tolerated     [x]  Continue plan of care  []  Update interventions per flow sheet       []  Discharge due to:_  []  Other:_      Peter Mac, NIMO 6/3/2019  11:04 AM    Future Appointments   Date Time Provider Dewayne Gamboa   6/5/2019 12:00 PM Hussain Robert PT [facility]   6/10/2019 10:30 AM Hussain Robert PT [facility]   6/13/2019  4:00 PM Candy Espinosa, PT [facility]   6/17/2019 10:30 AM [facility] Grijalva [facility]   6/19/2019 12:00 PM Candy Espinosa PT [facility]

## 2019-06-05 ENCOUNTER — HOSPITAL ENCOUNTER (OUTPATIENT)
Dept: PHYSICAL THERAPY | Age: 69
Discharge: HOME | End: 2019-06-05
Payer: MEDICARE

## 2019-06-05 PROCEDURE — 97530 THERAPEUTIC ACTIVITIES: CPT

## 2019-06-05 NOTE — PROGRESS NOTES
PT DAILY TREATMENT NOTE 10-18    Patient Name: Dory Willis  Date:2019  : 1950  [x]  Patient  Verified  Payor: [place] MEDICARE / Plan: VA MEDICARE PART A & B / Product Type: Medicare /    In time:1201  Out time:1236  Total Treatment Time (min): 35  Visit #: 6 of 16    Medicare/BCBS Only   Total Timed Codes (min):  35 1:1 Treatment Time:  35       Treatment Area: Dizziness and giddiness [R42]    SUBJECTIVE  Pain Level (0-10 scale): 5/10  Any medication changes, allergies to medications, adverse drug reactions, diagnosis change, or new procedure performed?: [x] No    [] Yes (see summary sheet for update)  Subjective functional status/changes:   [] No changes reported  Pt thinks the Welbutrin she is on for depression is the cause of her dizziness. She is not any better since stating therapy  OBJECTIVE        35 min Therapeutic Activity:  []  See flow sheet :   Rationale: increase ROM, increase strength, improve coordination and improve balance  to improve the patients ability to ease with ADL's           With   [] TE   [] TA   [] neuro   [] other: Patient Education: [x] Review HEP    [] Progressed/Changed HEP based on:   [] positioning   [] body mechanics   [] transfers   [] heat/ice application    [] other:      Other Objective/Functional Measures: DGI=   Unstable during dynamic gait with head turns.  Ater first few reps during cone stepovers she was able to improve stability  Worked on sit/stand during gardening activities while sitting on 12 inch step with feet on a foam . Pain Level (0-10 scale) post treatment: 5/10    ASSESSMENT/Changes in Function: Slow progress. Pt will call MD today to inquire about side effects including dizziness. Patient will continue to benefit from skilled PT services to modify and progress therapeutic interventions, address functional mobility deficits, address ROM deficits, analyze and cue movement patterns, analyze and modify body mechanics/ergonomics, assess and modify postural abnormalities and address imbalance/dizziness to attain remaining goals. []  See Plan of Care  []  See progress note/recertification  []  See Discharge Summary         Progress towards goals / Updated goals:  Short Term Goals: To be accomplished in 1 weeks:              1. Pt will be compliant with a HEP to improve function.   Goal met. 5/20/19              2. Pt will perform SOT to determine Level of sensory Input.    MET. 5/24/19  Long Term Goals: To be accomplished in 8 weeks:              1. Pt will increase FOTO score by 7    pts to ease with ADL's.              2. Pt will increase DGI to >18/24 to ease with safety during ambulation. Not met. 6/5/19              3. Pt will report >75% improvement in sx to return to ADL's and PLOF.                4. Pt will decrease DHI by 10% to improve sx of dizziness.        PLAN  [x]  Upgrade activities as tolerated     [x]  Continue plan of care  []  Update interventions per flow sheet       []  Discharge due to:_  []  Other:_      Ernesto Cruz, PT 6/5/2019  12:50 PM                  Future Appointments   Date Time Provider Dewayne Gamboa   6/10/2019 10:30 AM Andreina Harris, [facility]   6/13/2019  4:00 PM Lauren Benjamin PT [facility]   6/17/2019 10:30 AM Nathalia Lee, [facility] TIKVAEVA [facility]   6/19/2019 12:00 PM Lauren Benjamin PT [facility]

## 2019-06-10 ENCOUNTER — HOSPITAL ENCOUNTER (OUTPATIENT)
Dept: PHYSICAL THERAPY | Age: 69
Discharge: HOME | End: 2019-06-10
Payer: MEDICARE

## 2019-06-10 PROCEDURE — 97530 THERAPEUTIC ACTIVITIES: CPT

## 2019-06-10 PROCEDURE — 97110 THERAPEUTIC EXERCISES: CPT

## 2019-06-10 NOTE — PROGRESS NOTES
PT DAILY TREATMENT NOTE 10-18    Patient Name: Dory Willis  Date:6/10/2019  : 1950  [x]  Patient  Verified  Payor: [place] MEDICARE / Plan: VA MEDICARE PART A & B / Product Type: Medicare /    In CTPF:[id number]  Out time:1100  Total Treatment Time (min): 28  Visit #: 7 of 16    Medicare/BCBS Only   Total Timed Codes (min):  28 1:1 Treatment Time:  28       Treatment Area: Dizziness and giddiness [R42]    SUBJECTIVE  Pain Level (0-10 scale): 4/10  Any medication changes, allergies to medications, adverse drug reactions, diagnosis change, or new procedure performed?: [x] No    [] Yes (see summary sheet for update)  Subjective functional status/changes:   [] No changes reported  Reports she stopped the Wellbutrin last Wednesday and the dizziness is less now. She stopped this med due to possible side effects of dizziness (top 3)    OBJECTIVE      13 min Therapeutic Exercise:  [] See flow sheet :   Rationale: increase ROM, increase strength, improve coordination and improve balance to improve the patients ability to ease with ADL's    15 min Therapeutic Activity:  []  See flow sheet :   Rationale: improve balance and increase proprioception  to improve the patients ability to ease with community ambulation           With   [] TE   [] TA   [] neuro   [] other: Patient Education: [x] Review HEP    [] Progressed/Changed HEP based on:   [] positioning   [] body mechanics   [] transfers   [] heat/ice application    [] other:      Other Objective/Functional Measures: DGI=18/24  Pt able to perform plank ambulation, cones, foam and BB#1 x4/CGA  Minimal dizziness that lasted seconds only during treatment  LE MMT=4+/5  VOR on BB#1 w/o dizziness. Pain Level (0-10 scale) post treatment: 0/10    ASSESSMENT/Changes in Function: Progressing well. Pt reports no dizziness at end of session, only minimal dizziness that lasted seconds during treatment. Pt is progressing with sx improvement.  She will benefit from continued dynamic balance activities and DC soon as sx continues to improve. Pt progressing well toward goals and DC. Patient will continue to benefit from skilled PT services to modify and progress therapeutic interventions, address functional mobility deficits, address ROM deficits, address strength deficits, analyze and address soft tissue restrictions, analyze and cue movement patterns, analyze and modify body mechanics/ergonomics, assess and modify postural abnormalities and address imbalance/dizziness to attain remaining goals. [x]  See Plan of Care  []  See progress note/recertification  []  See Discharge Summary         Progress towards goals / Updated goals:  Short Term Goals: To be accomplished in 1 weeks:              1. Pt will be compliant with a HEP to improve function.   Goal met. 5/20/19              2. Pt will perform SOT to determine Level of sensory Input.    MET. 5/24/19  Long Term Goals: To be accomplished in 8 weeks:              8. Pt will increase FOTO score by 7    pts to ease with ADL's.              2. Pt will increase DGI to >18/24 to ease with safety during ambulation.    met. 6/10/19              3. Pt will report >75% improvement in sx to return to ADL's and PLOF.                4. Pt will decrease DHI by 10% to improve sx of dizziness.            PLAN  []  Upgrade activities as tolerated     [x]  Continue plan of care  []  Update interventions per flow sheet       []  Discharge due to:_  []  Other:_      Jenn Bravo, PT 6/10/2019  10:13 AM    Future Appointments   Date Time Provider Dewayne Gamboa   6/10/2019 10:30 AM Lisa Montenegro, PT [facility]   6/13/2019  4:00 PM Micah Lynn PT [facility]   6/17/2019 10:30 AM Breonna Triplett, PT [facility]   6/19/2019 12:00 PM Micah Lynn PT [facility]

## 2019-06-17 ENCOUNTER — HOSPITAL ENCOUNTER (OUTPATIENT)
Dept: PHYSICAL THERAPY | Age: 69
Discharge: HOME | End: 2019-06-17
Payer: MEDICARE

## 2019-06-17 PROCEDURE — 97164 PT RE-EVAL EST PLAN CARE: CPT

## 2019-06-17 NOTE — PROGRESS NOTES
PT DISCHARGE DAILY NOTE AND [id number]    Patient name: Slime Samano Start of Care: 19   Referral source: Carmita Nicholas MD : 1950   Medical/Treatment Diagnosis: Dizziness and giddiness [R42] Onset Date:4 months ago     Prior Hospitalization: see medical history Provider#: [id number]   Medications: Verified on Patient Summary List    Comorbidities: Arthritis, Hearing Impaired, Anxiety. Prior Level of Function: Independent. Visits from Start of Care: 8    Missed Visits: 1    Reporting Period : 5/15/19 to 19    Date:2019  : 1950  [x]  Patient  Verified  Payor: Carolin Breath / Plan: [place] MEDICARE PART A & B / Product Type: Medicare /    In time:10:30  Out time:10:46  Total Treatment Time (min): 16  Visit #: 8 of 16    Medicare/BCBS Only   Total Timed Codes (min):  0 1:1 Treatment Time:  16       SUBJECTIVE  Pain Level (0-10 scale): 1/10 dizzy   Any medication changes, allergies to medications, adverse drug reactions, diagnosis change, or new procedure performed?: [x] No    [] Yes (see summary sheet for update)  Subjective functional status/changes:   [] No changes reported  Pt reports 95% improvement in sx. She would like to be DC today. OBJECTIVE    16 min []Eval                  [x]Re-Eval             With   [] TE   [] TA   [] neuro   [] other: Patient Education: [x] Review HEP    [] Progressed/Changed HEP based on:   [] positioning   [] body mechanics   [] transfers   [] heat/ice application    [] other:      Other Objective/Functional Measures:     FOTO 97  DHI 10  DGI 23/24  Required 1 HR when descending stairs during DGI d/t knee impairments     Reviewed final HEP, no questions/concerns     Pain Level (0-10 scale) post treatment: 0/10    Summary of Care:  Goal: Pt will be compliant with a HEP to improve function.   Status at last note/certification: Goal met.    Status at discharge: met    Goal: Pt will perform SOT to determine Level of sensory Input.   Status at last note/certification: Goal met. Status at discharge: met    Goal: Pt will increase FOTO score by 7    pts to ease with ADL's. Status at last note/certification: Goal met. Improved 29 points   Status at discharge: met    Goal: Pt will increase DGI to >18/24 to ease with safety during ambulation. Status at last note/certification: Goal met. 23/24  Status at discharge: met    Goal: Pt will report >75% improvement in sx to return to ADL's and PLOF. Status at last note/certification: Goal met. Reports 95% improvement   Status at discharge: met    Goal: Pt will decrease DHI by 10% to improve sx of dizziness.   Status at last note/certification:Goal met. Decreased 16 points   Status at discharge: met    ASSESSMENT/Changes in Function:     Pt has made steady progress in therapy, meeting 100% of initial goals. She reports 95% improvement in sx and no functional limitations at this time. DC as skilled intervention is no longer required.      Thank you for this referral!      PLAN  [x]Discontinue therapy: [x]Patient has reached or is progressing toward set goals      []Patient is non-compliant or has abdicated      []Due to lack of appreciable progress towards set goals    Cem Prater, PT 6/17/2019  10:32 AM

## 2019-06-19 ENCOUNTER — APPOINTMENT (OUTPATIENT)
Dept: PHYSICAL THERAPY | Age: 69
End: 2019-06-19
Payer: MEDICARE

## 2019-06-27 ENCOUNTER — OFFICE VISIT (OUTPATIENT)
Dept: ORTHOPEDIC SURGERY | Facility: CLINIC | Age: 69
End: 2019-06-27

## 2019-06-27 VITALS
HEIGHT: 64 IN | RESPIRATION RATE: 16 BRPM | DIASTOLIC BLOOD PRESSURE: 76 MMHG | WEIGHT: 135 LBS | TEMPERATURE: 97.5 F | OXYGEN SATURATION: 99 % | BODY MASS INDEX: 23.05 KG/M2 | SYSTOLIC BLOOD PRESSURE: 102 MMHG | HEART RATE: 68 BPM

## 2019-06-27 DIAGNOSIS — M17.12 PRIMARY OSTEOARTHRITIS OF LEFT KNEE: ICD-10-CM

## 2019-06-27 DIAGNOSIS — M25.562 ACUTE PAIN OF LEFT KNEE: Primary | ICD-10-CM

## 2019-06-27 RX ORDER — BETAMETHASONE SODIUM PHOSPHATE AND BETAMETHASONE ACETATE 3; 3 MG/ML; MG/ML
6 INJECTION, SUSPENSION INTRA-ARTICULAR; INTRALESIONAL; INTRAMUSCULAR; SOFT TISSUE ONCE
Qty: 0.5 ML | Refills: 0
Start: 2019-06-27 | End: 2019-06-27
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
## 2019-08-23 ENCOUNTER — HOSPITAL ENCOUNTER (OUTPATIENT)
Dept: MAMMOGRAPHY | Age: 69
Discharge: HOME | End: 2019-08-23
Attending: PHYSICIAN ASSISTANT
Payer: MEDICARE

## 2019-08-23 DIAGNOSIS — Z12.31 VISIT FOR SCREENING MAMMOGRAM: ICD-10-CM

## 2019-08-23 PROCEDURE — 77067 SCR MAMMO BI INCL CAD: CPT

## 2019-09-20 ENCOUNTER — HOSPITAL ENCOUNTER (OUTPATIENT)
Dept: ULTRASOUND IMAGING | Age: 69
Discharge: HOME | End: 2019-09-20
Attending: PHYSICIAN ASSISTANT
Payer: MEDICARE

## 2019-09-20 DIAGNOSIS — R10.11 RUQ PAIN: ICD-10-CM

## 2019-09-20 PROCEDURE — 76705 ECHO EXAM OF ABDOMEN: CPT

## 2020-05-15 ENCOUNTER — HOSPITAL ENCOUNTER (OUTPATIENT)
Dept: LAB | Age: 70
Discharge: HOME | End: 2020-05-15
Payer: MEDICARE

## 2020-05-15 DIAGNOSIS — F33.40 RECURRENT MAJOR DEPRESSION IN REMISSION (HCC): ICD-10-CM

## 2020-05-15 DIAGNOSIS — F51.01 PRIMARY INSOMNIA: ICD-10-CM

## 2020-05-15 DIAGNOSIS — E78.5 HYPERLIPEMIA: ICD-10-CM

## 2020-05-15 LAB
ALBUMIN SERPL-MCNC: 3.4 G/DL (ref 3.4–5)
ALBUMIN/GLOB SERPL: 1.1 {RATIO} (ref 0.8–1.7)
ALP SERPL-CCNC: 79 U/L (ref 45–117)
ALT SERPL-CCNC: 31 U/L (ref 13–56)
ANION GAP SERPL CALC-SCNC: 3 MMOL/L (ref 3–18)
AST SERPL-CCNC: 25 U/L (ref 10–38)
BASOPHILS # BLD: 0 K/UL (ref 0–0.1)
BASOPHILS NFR BLD: 1 % (ref 0–2)
BILIRUB SERPL-MCNC: 0.3 MG/DL (ref 0.2–1)
BUN SERPL-MCNC: 15 MG/DL (ref 7–18)
BUN/CREAT SERPL: 19 (ref 12–20)
CALCIUM SERPL-MCNC: 8.5 MG/DL (ref 8.5–10.1)
CHLORIDE SERPL-SCNC: 110 MMOL/L (ref 100–111)
CHOLEST SERPL-MCNC: 208 MG/DL
CO2 SERPL-SCNC: 30 MMOL/L (ref 21–32)
CREAT SERPL-MCNC: 0.77 MG/DL (ref 0.6–1.3)
DIFFERENTIAL METHOD BLD: ABNORMAL
EOSINOPHIL # BLD: 0.1 K/UL (ref 0–0.4)
EOSINOPHIL NFR BLD: 1 % (ref 0–5)
ERYTHROCYTE [DISTWIDTH] IN BLOOD BY AUTOMATED COUNT: 13 % (ref 11.6–14.5)
GLOBULIN SER CALC-MCNC: 3.2 G/DL (ref 2–4)
GLUCOSE SERPL-MCNC: 79 MG/DL (ref 74–99)
HCT VFR BLD AUTO: 34.6 % (ref 35–45)
HDLC SERPL-MCNC: 82 MG/DL (ref 40–60)
HDLC SERPL: 2.5 {RATIO} (ref 0–5)
HGB BLD-MCNC: 11.2 G/DL (ref 12–16)
LDLC SERPL CALC-MCNC: 113.6 MG/DL (ref 0–100)
LIPID PROFILE,FLP: ABNORMAL
LYMPHOCYTES # BLD: 1.8 K/UL (ref 0.9–3.6)
LYMPHOCYTES NFR BLD: 41 % (ref 21–52)
MCH RBC QN AUTO: 29.1 PG (ref 24–34)
MCHC RBC AUTO-ENTMCNC: 32.4 G/DL (ref 31–37)
MCV RBC AUTO: 89.9 FL (ref 74–97)
MONOCYTES # BLD: 0.5 K/UL (ref 0.05–1.2)
MONOCYTES NFR BLD: 12 % (ref 3–10)
NEUTS SEG # BLD: 2 K/UL (ref 1.8–8)
NEUTS SEG NFR BLD: 45 % (ref 40–73)
PLATELET # BLD AUTO: 342 K/UL (ref 135–420)
PMV BLD AUTO: 8.8 FL (ref 9.2–11.8)
POTASSIUM SERPL-SCNC: 4.5 MMOL/L (ref 3.5–5.5)
PROT SERPL-MCNC: 6.6 G/DL (ref 6.4–8.2)
RBC # BLD AUTO: 3.85 M/UL (ref 4.2–5.3)
SODIUM SERPL-SCNC: 143 MMOL/L (ref 136–145)
T4 FREE SERPL-MCNC: 1 NG/DL (ref 0.7–1.5)
TRIGL SERPL-MCNC: 62 MG/DL (ref ?–150)
TSH SERPL DL<=0.05 MIU/L-ACNC: 1.5 UIU/ML (ref 0.36–3.74)
VLDLC SERPL CALC-MCNC: 12.4 MG/DL
WBC # BLD AUTO: 4.4 K/UL (ref 4.6–13.2)

## 2020-05-15 PROCEDURE — 84439 ASSAY OF FREE THYROXINE: CPT

## 2020-05-15 PROCEDURE — 80061 LIPID PANEL: CPT

## 2020-05-15 PROCEDURE — 85025 COMPLETE CBC W/AUTO DIFF WBC: CPT

## 2020-05-15 PROCEDURE — 84443 ASSAY THYROID STIM HORMONE: CPT

## 2020-05-15 PROCEDURE — 36415 COLL VENOUS BLD VENIPUNCTURE: CPT

## 2020-05-15 PROCEDURE — 80053 COMPREHEN METABOLIC PANEL: CPT

## 2020-08-10 ENCOUNTER — HOSPITAL ENCOUNTER (OUTPATIENT)
Dept: PREADMISSION TESTING | Age: 70
Discharge: HOME | End: 2020-08-10
Payer: MEDICARE

## 2020-08-10 DIAGNOSIS — Z01.812 BLOOD TESTS PRIOR TO TREATMENT OR PROCEDURE: ICD-10-CM

## 2020-08-10 DIAGNOSIS — M17.12 DEGENERATIVE ARTHRITIS OF LEFT KNEE: ICD-10-CM

## 2020-08-10 LAB
ALBUMIN SERPL-MCNC: 3.5 G/DL (ref 3.4–5)
ALBUMIN/GLOB SERPL: 1.1 {RATIO} (ref 0.8–1.7)
ALP SERPL-CCNC: 89 U/L (ref 45–117)
ALT SERPL-CCNC: 19 U/L (ref 13–56)
ANION GAP SERPL CALC-SCNC: 2 MMOL/L (ref 3–18)
APPEARANCE UR: CLEAR
APTT PPP: 23.3 SEC (ref 23–36.4)
AST SERPL-CCNC: 16 U/L (ref 10–38)
ATRIAL RATE: 79 BPM
BACTERIA URNS QL MICRO: NEGATIVE /HPF
BASOPHILS # BLD: 0 K/UL (ref 0–0.1)
BASOPHILS NFR BLD: 0 % (ref 0–2)
BILIRUB SERPL-MCNC: 0.2 MG/DL (ref 0.2–1)
BILIRUB UR QL: NEGATIVE
BUN SERPL-MCNC: 15 MG/DL (ref 7–18)
BUN/CREAT SERPL: 19 (ref 12–20)
CALCIUM SERPL-MCNC: 8.8 MG/DL (ref 8.5–10.1)
CALCULATED P AXIS, ECG09: 78 DEGREES
CALCULATED R AXIS, ECG10: 68 DEGREES
CALCULATED T AXIS, ECG11: 72 DEGREES
CHLORIDE SERPL-SCNC: 108 MMOL/L (ref 100–111)
CO2 SERPL-SCNC: 32 MMOL/L (ref 21–32)
COLOR UR: YELLOW
CREAT SERPL-MCNC: 0.77 MG/DL (ref 0.6–1.3)
DIAGNOSIS, 93000: NORMAL
DIFFERENTIAL METHOD BLD: ABNORMAL
EOSINOPHIL # BLD: 0.1 K/UL (ref 0–0.4)
EOSINOPHIL NFR BLD: 1 % (ref 0–5)
EPITH CASTS URNS QL MICRO: NORMAL /LPF (ref 0–5)
ERYTHROCYTE [DISTWIDTH] IN BLOOD BY AUTOMATED COUNT: 12.6 % (ref 11.6–14.5)
ERYTHROCYTE [SEDIMENTATION RATE] IN BLOOD: 22 MM/HR (ref 0–30)
EST. AVERAGE GLUCOSE BLD GHB EST-MCNC: 114 MG/DL
GLOBULIN SER CALC-MCNC: 3.2 G/DL (ref 2–4)
GLUCOSE SERPL-MCNC: 116 MG/DL (ref 74–99)
GLUCOSE UR STRIP.AUTO-MCNC: NEGATIVE MG/DL
HBA1C MFR BLD: 5.6 % (ref 4.2–5.6)
HCT VFR BLD AUTO: 37.8 % (ref 35–45)
HGB BLD-MCNC: 11.6 G/DL (ref 12–16)
HGB UR QL STRIP: NEGATIVE
INR PPP: 0.9 (ref 0.8–1.2)
KETONES UR QL STRIP.AUTO: NEGATIVE MG/DL
LEUKOCYTE ESTERASE UR QL STRIP.AUTO: ABNORMAL
LYMPHOCYTES # BLD: 2.5 K/UL (ref 0.9–3.6)
LYMPHOCYTES NFR BLD: 37 % (ref 21–52)
MCH RBC QN AUTO: 28.6 PG (ref 24–34)
MCHC RBC AUTO-ENTMCNC: 30.7 G/DL (ref 31–37)
MCV RBC AUTO: 93.3 FL (ref 74–97)
MONOCYTES # BLD: 0.7 K/UL (ref 0.05–1.2)
MONOCYTES NFR BLD: 10 % (ref 3–10)
NEUTS SEG # BLD: 3.4 K/UL (ref 1.8–8)
NEUTS SEG NFR BLD: 52 % (ref 40–73)
NITRITE UR QL STRIP.AUTO: NEGATIVE
P-R INTERVAL, ECG05: 174 MS
PH UR STRIP: 5.5 [PH] (ref 5–8)
PLATELET # BLD AUTO: 347 K/UL (ref 135–420)
PMV BLD AUTO: 8.6 FL (ref 9.2–11.8)
POTASSIUM SERPL-SCNC: 3.8 MMOL/L (ref 3.5–5.5)
PROT SERPL-MCNC: 6.7 G/DL (ref 6.4–8.2)
PROT UR STRIP-MCNC: NEGATIVE MG/DL
PROTHROMBIN TIME: 12.5 SEC (ref 11.5–15.2)
Q-T INTERVAL, ECG07: 420 MS
QRS DURATION, ECG06: 90 MS
QTC CALCULATION (BEZET), ECG08: 481 MS
RBC # BLD AUTO: 4.05 M/UL (ref 4.2–5.3)
RBC #/AREA URNS HPF: NEGATIVE /HPF (ref 0–5)
SODIUM SERPL-SCNC: 142 MMOL/L (ref 136–145)
SP GR UR REFRACTOMETRY: 1 (ref 1–1.03)
UROBILINOGEN UR QL STRIP.AUTO: 0.2 EU/DL (ref 0.2–1)
VENTRICULAR RATE, ECG03: 79 BPM
WBC # BLD AUTO: 6.7 K/UL (ref 4.6–13.2)
WBC URNS QL MICRO: NORMAL /HPF (ref 0–5)

## 2020-08-10 PROCEDURE — 85652 RBC SED RATE AUTOMATED: CPT

## 2020-08-10 PROCEDURE — 81001 URINALYSIS AUTO W/SCOPE: CPT

## 2020-08-10 PROCEDURE — 83036 HEMOGLOBIN GLYCOSYLATED A1C: CPT

## 2020-08-10 PROCEDURE — 36415 COLL VENOUS BLD VENIPUNCTURE: CPT

## 2020-08-10 PROCEDURE — 85025 COMPLETE CBC W/AUTO DIFF WBC: CPT

## 2020-08-10 PROCEDURE — 80053 COMPREHEN METABOLIC PANEL: CPT

## 2020-08-10 PROCEDURE — 85610 PROTHROMBIN TIME: CPT

## 2020-08-10 PROCEDURE — 93005 ELECTROCARDIOGRAM TRACING: CPT

## 2020-08-10 PROCEDURE — 85730 THROMBOPLASTIN TIME PARTIAL: CPT

## 2020-08-12 LAB
BACTERIA SPEC CULT: NORMAL
BACTERIA SPEC CULT: NORMAL
SERVICE CMNT-IMP: NORMAL

## 2020-08-17 PROBLEM — M17.12 PRIMARY LOCALIZED OSTEOARTHRITIS OF LEFT KNEE: Chronic | Status: ACTIVE | Noted: 2020-08-17

## 2020-08-17 NOTE — DISCHARGE INSTRUCTIONS
[address] [place] [address] Sports Medicine   Patient Discharge Instructions    Juan Low / [id number] : 1950    Admitted (Not on file) Discharged: 2020     IF YOU HAVE ANY PROBLEMS ONCE YOU ARE  [facility]:   Main office number: [phone]    Your follow up appointment to see either Dr. Herold Leventhal PA-C, or [facility] RICHELLE as scheduled in 2 weeks. If you are unsure of your appointment date call the office at [phone]. Medication Instructions     · Resume your home medictions as directed, you may have directed not to resume supplements until after your follow up. · A prescription for pain medication has been given   · It is important that you take the medication exactly as they are prescribed. · Keep your medication in the bottles provided by the pharmacist and keep a list of the medication names, dosages, and times to be taken in your wallet. · Do not take other medications without consulting your doctor. What to do at [address] [place] [address] Ave your prehospital diet. If you have excessive nausea or vomitting call your doctor's office. Be sure to maintain adequate fluid intake. Some pain medications may cause constipation. Remember to drink fluids, stay as active as possible, and eat plenty of fiber-rich foods. Begin In-Home Physical Therapy; 3 times a week to work on gait training, range of motion, strengthening, and weight bearing exercises as tolerable. Continue to use your walker or cane when walking. May progress from the walker to a cane to complete total bearing as tolerable. Patient may shower. Wrap incision with plastic wrap/covering to prevent incision from getting wet. Avoid complete immersion. YOUR DRESSING SHOULD BE CHANGED IN 3-5 DAYS BY [facility] NURSE.       When to Call    - Call if you have a temperature greater then 101  - Unable to keep food down  - Are unable to bear any wieght   - Need a pain medication refill     Information obtained by :  I understand that if any problems occur once I am at home I am to contact my physician. I understand and acknowledge receipt of the instructions indicated above.                                                                                                                                            Physician's or R.N.'s Signature                                                                  Date/Time                                                                                                                                              Patient or Representative Signature                                                          Date/Time

## 2020-08-17 NOTE — H&P
[address] [facility] [address]  History and Physical Exam    Patient: Margret Echols MRN: [id number]  SSN: [id number]    YOB: 1950  Age: 71 y.o. Sex: female      Subjective:      Chief Complaint: Left knee pain    History of Present Illness:  Patient complains of pain to the left knee and difficulty ambulating, which has progressively worsened over several months. X-rays showed osteoarthritis of the joint. The patient's pain has persisted and progressed despite conservative treatments and therapies. The patient has been previously treated with nsaids. The patient has at this time opted for surgical intervention. Past Medical History:   Diagnosis Date    Anxiety disorder     Arthritis     Basal joint arthritis, left thumb    GERD (gastroesophageal reflux disease)     Headache(784.0)     Hearing reduced     Left knee pain     Lower back pain July 2010    Menopause     Age 39    Osteoarthritis of left knee     Pain of left thumb     Primary localized osteoarthritis of left knee 8/17/2020    Psychiatric disorder     depression    Sacroiliitis ([address] 9/15/2010    SI (sacroiliac) joint dysfunction     Sinus trouble     Ulcer 2006    polyp, positive heme occult stools, denies ulcer     Past Surgical History:   Procedure Laterality Date    HX DILATION AND CURETTAGE      HX ORTHOPAEDIC  9/15/2010    Left SI joint injection 30mg Celestone and 4 cc Lidocaine    HX TUBAL LIGATION       Social History     Occupational History    Occupation: teacher's asst     Comment: full time   Tobacco Use    Smoking status: Never Smoker    Smokeless tobacco: Never Used    Tobacco comment:  SMOKED    Substance and Sexual Activity    Alcohol use: No     Alcohol/week: 0.0 standard drinks    Drug use: No    Sexual activity: Not Currently     Comment: BTL     Prior to Admission medications    Medication Sig Start Date End Date Taking?  Authorizing Provider   buPROPion SR [facility] SR) 150 mg SR tablet Take 150 mg by mouth daily. Indications: anxiousness associated with depression    Provider, Historical   rosuvastatin (Crestor) 10 mg tablet Take 10 mg by mouth nightly. Provider, Historical   krill-omega-3-dha-epa-lipids [id number] mg cap Take  by mouth daily. Provider, Historical   FLUoxetine (PROZAC) 20 mg capsule Take 60 mg by mouth daily. Indications: anxiousness associated with depression    Provider, Historical   traZODone (DESYREL) 50 mg tablet Take 100 mg by mouth nightly. Provider, Historical   ALPRAZolam (XANAX) 0.5 mg tablet Take  by mouth nightly as needed. Provider, Historical   cyanocobalamin (VITAMIN B-12) 500 mcg tablet Take 500 mcg by mouth daily. Provider, Historical   calcium 600 mg Cap Take  by mouth daily. Provider, Historical   OMEPRAZOLE MAGNESIUM (PRILOSEC OTC PO) Take 40 mg by mouth daily. Provider, Historical       Allergies: Allergies   Allergen Reactions    Topamax [Topiramate] Other (comments)     Whole body tingling  Blurry vision        Review of Systems:  A comprehensive review of systems was negative except for that written in the History of Present Illness. Objective:       Physical Exam:  HEENT: Normocephalic, atraumatic  Lungs:  Clear to auscultation  Heart:   Regular rate and rhythm  Abdomen: Soft  Extremities:  Pain with range of motion of the left knee. Active extension decreased, active flexion decreased   Tenderness generalized. No deformity. No effusion. Positive crepitus. Antalgic gait. Assessment:      Arthritis of the left knee. Plan:       Proceed with scheduled LEFT TOTAL KNEE ARTHROPLASTY. The various methods of treatment have been discussed with the patient and family. After consideration of risks, benefits, and other options for treatment, the patient has consented to surgical interventions. Questions were answered and preoperative teaching was done by Dr Cortney Paiz.      Signed By: Uziel Cintron, [address]     August 17, 2020

## 2020-08-18 ENCOUNTER — TELEPHONE (OUTPATIENT)
Dept: OTHER | Age: 70
End: 2020-08-18

## 2020-08-18 NOTE — TELEPHONE ENCOUNTER
Spoke with Bruce Mendoza about their total knee replacement. Educated patient about getting ready for surgery, what to expect after surgery during hospital stay, and how to get ready now for discharge. Discussion included:  1) Importance of good nutrition before and after surgery. 2) Preventing nausea by eating before taking pain medications. 3) Getting Medical Equipment before coming to the hospital.  4) Reading the education book before surgery. 5) Wearing comfortable clothes that are easy to put on and take off.  6) Buying a stool softener and taking one everyday after surgery to prevent constipation. 7) Getting home ready for after surgery. 8) Home health after surgery. 9) Drinking lots of fluids to make sure urine is light yellow. 10) Moving after surgery. 11) What to expect the day of surgery. Informed patient that increased swelling, bruising and pain are normal at home after knee replacement. Instructed Bruce Mendoza it is safe to elevate whole leg on pillows above heart level to help decrease swelling after surgery. Instructed not to put anything directly under the knee so that they are working on straightening the leg when resting. Patient was given the opportunity to ask questions. Orth[facility] Navigators phone number given to patient for any questions that they need answered before or after surgery.      Orthopaedic Navigator

## 2020-08-21 ENCOUNTER — HOSPITAL ENCOUNTER (OUTPATIENT)
Dept: PREADMISSION TESTING | Age: 70
Discharge: HOME | End: 2020-08-21
Attending: ORTHOPAEDIC SURGERY
Payer: MEDICARE

## 2020-08-21 PROCEDURE — 87635 SARS-COV-2 COVID-19 AMP PRB: CPT

## 2020-08-22 LAB — SARS-COV-2, COV2NT: NOT DETECTED

## 2020-08-26 ENCOUNTER — ANESTHESIA EVENT (OUTPATIENT)
Dept: SURGERY | Age: 70
End: 2020-08-26
Payer: MEDICARE

## 2020-08-27 ENCOUNTER — HOSPITAL ENCOUNTER (OUTPATIENT)
Age: 70
Discharge: HOME HEALTH | End: 2020-08-27
Attending: ORTHOPAEDIC SURGERY | Admitting: ORTHOPAEDIC SURGERY
Payer: MEDICARE

## 2020-08-27 ENCOUNTER — APPOINTMENT (OUTPATIENT)
Dept: GENERAL RADIOLOGY | Age: 70
End: 2020-08-27
Attending: PHYSICIAN ASSISTANT
Payer: MEDICARE

## 2020-08-27 ENCOUNTER — HOME HEALTH ADMISSION (OUTPATIENT)
Dept: HOME HEALTH SERVICES | Facility: HOME HEALTH | Age: 70
End: 2020-08-27
Payer: MEDICARE

## 2020-08-27 ENCOUNTER — ANESTHESIA (OUTPATIENT)
Dept: SURGERY | Age: 70
End: 2020-08-27
Payer: MEDICARE

## 2020-08-27 VITALS
WEIGHT: 137.38 LBS | HEART RATE: 82 BPM | OXYGEN SATURATION: 98 % | TEMPERATURE: 97.4 F | BODY MASS INDEX: 23.45 KG/M2 | DIASTOLIC BLOOD PRESSURE: 59 MMHG | RESPIRATION RATE: 16 BRPM | HEIGHT: 64 IN | SYSTOLIC BLOOD PRESSURE: 121 MMHG

## 2020-08-27 DIAGNOSIS — M17.12 PRIMARY LOCALIZED OSTEOARTHRITIS OF LEFT KNEE: Primary | Chronic | ICD-10-CM

## 2020-08-27 LAB
ABO + RH BLD: NORMAL
BLOOD GROUP ANTIBODIES SERPL: NORMAL
SPECIMEN EXP DATE BLD: NORMAL

## 2020-08-27 PROCEDURE — 76060000033 HC ANESTHESIA 1 TO 1.5 HR: Performed by: ORTHOPAEDIC SURGERY

## 2020-08-27 PROCEDURE — 97535 SELF CARE MNGMENT TRAINING: CPT

## 2020-08-27 PROCEDURE — 77030016060 HC NDL NRV BLK TELE -A: Performed by: ANESTHESIOLOGY

## 2020-08-27 PROCEDURE — 77030037713 HC CLOSR DEV INCIS ZIP STRY -B: Performed by: ORTHOPAEDIC SURGERY

## 2020-08-27 PROCEDURE — 77030020782 HC GWN BAIR PAWS FLX 3M -B: Performed by: ORTHOPAEDIC SURGERY

## 2020-08-27 PROCEDURE — C1776 JOINT DEVICE (IMPLANTABLE): HCPCS | Performed by: ORTHOPAEDIC SURGERY

## 2020-08-27 PROCEDURE — 77030034694 HC SCPL CANADY PLSM DISP USMD -E: Performed by: ORTHOPAEDIC SURGERY

## 2020-08-27 PROCEDURE — 86900 BLOOD TYPING SEROLOGIC ABO: CPT

## 2020-08-27 PROCEDURE — 64447 NJX AA&/STRD FEMORAL NRV IMG: CPT | Performed by: ANESTHESIOLOGY

## 2020-08-27 PROCEDURE — 97116 GAIT TRAINING THERAPY: CPT

## 2020-08-27 PROCEDURE — 74011000258 HC RX REV CODE- 258: Performed by: ORTHOPAEDIC SURGERY

## 2020-08-27 PROCEDURE — 74011250636 HC RX REV CODE- 250/636: Performed by: NURSE ANESTHETIST, CERTIFIED REGISTERED

## 2020-08-27 PROCEDURE — 74011000250 HC RX REV CODE- 250: Performed by: NURSE ANESTHETIST, CERTIFIED REGISTERED

## 2020-08-27 PROCEDURE — 77030011628: Performed by: ORTHOPAEDIC SURGERY

## 2020-08-27 PROCEDURE — 36415 COLL VENOUS BLD VENIPUNCTURE: CPT

## 2020-08-27 PROCEDURE — L1830 KO IMMOB CANVAS LONG PRE OTS: HCPCS

## 2020-08-27 PROCEDURE — 76942 ECHO GUIDE FOR BIOPSY: CPT | Performed by: ORTHOPAEDIC SURGERY

## 2020-08-27 PROCEDURE — 97161 PT EVAL LOW COMPLEX 20 MIN: CPT

## 2020-08-27 PROCEDURE — 76210000063 HC OR PH I REC FIRST 0.5 HR: Performed by: ORTHOPAEDIC SURGERY

## 2020-08-27 PROCEDURE — 77030012893: Performed by: ORTHOPAEDIC SURGERY

## 2020-08-27 PROCEDURE — 74011250636 HC RX REV CODE- 250/636: Performed by: PHYSICIAN ASSISTANT

## 2020-08-27 PROCEDURE — 77030040361 HC SLV COMPR DVT MDII -B: Performed by: ORTHOPAEDIC SURGERY

## 2020-08-27 PROCEDURE — 74011250636 HC RX REV CODE- 250/636: Performed by: ORTHOPAEDIC SURGERY

## 2020-08-27 PROCEDURE — 73560 X-RAY EXAM OF KNEE 1 OR 2: CPT

## 2020-08-27 PROCEDURE — C9290 INJ, BUPIVACAINE LIPOSOME: HCPCS | Performed by: ORTHOPAEDIC SURGERY

## 2020-08-27 PROCEDURE — 77030012508 HC MSK AIRWY LMA AMBU -A: Performed by: ANESTHESIOLOGY

## 2020-08-27 PROCEDURE — 77030027138 HC INCENT SPIROMETER -A: Performed by: ORTHOPAEDIC SURGERY

## 2020-08-27 PROCEDURE — 77030033263 HC DRSG MEPILEX 16-48IN BORD MOLN -B: Performed by: ORTHOPAEDIC SURGERY

## 2020-08-27 PROCEDURE — 74011000250 HC RX REV CODE- 250: Performed by: ORTHOPAEDIC SURGERY

## 2020-08-27 PROCEDURE — 77030003666 HC NDL SPINAL BD -A: Performed by: ORTHOPAEDIC SURGERY

## 2020-08-27 PROCEDURE — 77030038010: Performed by: ORTHOPAEDIC SURGERY

## 2020-08-27 PROCEDURE — C1713 ANCHOR/SCREW BN/BN,TIS/BN: HCPCS | Performed by: ORTHOPAEDIC SURGERY

## 2020-08-27 PROCEDURE — 77030002934 HC SUT MCRYL J&J -B: Performed by: ORTHOPAEDIC SURGERY

## 2020-08-27 PROCEDURE — 77030031139 HC SUT VCRL2 J&J -A: Performed by: ORTHOPAEDIC SURGERY

## 2020-08-27 PROCEDURE — 97166 OT EVAL MOD COMPLEX 45 MIN: CPT

## 2020-08-27 PROCEDURE — 74011000250 HC RX REV CODE- 250: Performed by: ANESTHESIOLOGY

## 2020-08-27 PROCEDURE — 74011250637 HC RX REV CODE- 250/637: Performed by: ANESTHESIOLOGY

## 2020-08-27 PROCEDURE — 74011250637 HC RX REV CODE- 250/637: Performed by: PHYSICIAN ASSISTANT

## 2020-08-27 PROCEDURE — 74011250636 HC RX REV CODE- 250/636: Performed by: ANESTHESIOLOGY

## 2020-08-27 PROCEDURE — 77030013708 HC HNDPC SUC IRR PULS STRY –B: Performed by: ORTHOPAEDIC SURGERY

## 2020-08-27 PROCEDURE — 97530 THERAPEUTIC ACTIVITIES: CPT

## 2020-08-27 PROCEDURE — 76010000149 HC OR TIME 1 TO 1.5 HR: Performed by: ORTHOPAEDIC SURGERY

## 2020-08-27 PROCEDURE — 77030039760: Performed by: ORTHOPAEDIC SURGERY

## 2020-08-27 DEVICE — LT SIZE 4 NARROW FEMORAL CR NONPOROUS
Type: IMPLANTABLE DEVICE | Site: KNEE | Status: FUNCTIONAL
Brand: BKS TRIMAX

## 2020-08-27 DEVICE — CEMENT BNE 40GM FULL DOSE PMMA W/O ANTIBIO HI VISC N RADPQ: Type: IMPLANTABLE DEVICE | Site: KNEE | Status: FUNCTIONAL

## 2020-08-27 DEVICE — SIZE 3 9MM TIBIAL INSERT CR
Type: IMPLANTABLE DEVICE | Site: KNEE | Status: FUNCTIONAL
Brand: BKS E-VITALIZE

## 2020-08-27 DEVICE — SIZE 3 TIBIAL TRAY NONPOROUS
Type: IMPLANTABLE DEVICE | Site: KNEE | Status: FUNCTIONAL
Brand: BALANCED KNEE SYSTEM

## 2020-08-27 DEVICE — KNEE K1 TOT HEMI STD CEM IMPL CAPPED K1 OD: Type: IMPLANTABLE DEVICE | Site: KNEE | Status: FUNCTIONAL

## 2020-08-27 DEVICE — 29MM PATELLA, VITAMIN E
Type: IMPLANTABLE DEVICE | Site: KNEE | Status: FUNCTIONAL
Brand: BKS E-VITALIZE

## 2020-08-27 RX ORDER — HYDROMORPHONE HYDROCHLORIDE 2 MG/ML
0.2 INJECTION, SOLUTION INTRAMUSCULAR; INTRAVENOUS; SUBCUTANEOUS AS NEEDED
Status: DISCONTINUED | OUTPATIENT
Start: 2020-08-27 | End: 2020-08-27 | Stop reason: HOSPADM

## 2020-08-27 RX ORDER — ALBUTEROL SULFATE 0.83 MG/ML
2.5 SOLUTION RESPIRATORY (INHALATION) AS NEEDED
Status: DISCONTINUED | OUTPATIENT
Start: 2020-08-27 | End: 2020-08-27 | Stop reason: HOSPADM

## 2020-08-27 RX ORDER — SODIUM CHLORIDE 9 MG/ML
125 INJECTION, SOLUTION INTRAVENOUS CONTINUOUS
Status: DISCONTINUED | OUTPATIENT
Start: 2020-08-27 | End: 2020-08-27 | Stop reason: HOSPADM

## 2020-08-27 RX ORDER — PANTOPRAZOLE SODIUM 40 MG/1
40 TABLET, DELAYED RELEASE ORAL DAILY
Status: DISCONTINUED | OUTPATIENT
Start: 2020-08-27 | End: 2020-08-27 | Stop reason: HOSPADM

## 2020-08-27 RX ORDER — INSULIN LISPRO 100 [IU]/ML
INJECTION, SOLUTION INTRAVENOUS; SUBCUTANEOUS ONCE
Status: DISCONTINUED | OUTPATIENT
Start: 2020-08-27 | End: 2020-08-27 | Stop reason: HOSPADM

## 2020-08-27 RX ORDER — ALPRAZOLAM 0.5 MG/1
0.5 TABLET ORAL
Status: DISCONTINUED | OUTPATIENT
Start: 2020-08-27 | End: 2020-08-27 | Stop reason: HOSPADM

## 2020-08-27 RX ORDER — ONDANSETRON 2 MG/ML
4 INJECTION INTRAMUSCULAR; INTRAVENOUS ONCE
Status: DISCONTINUED | OUTPATIENT
Start: 2020-08-27 | End: 2020-08-27 | Stop reason: HOSPADM

## 2020-08-27 RX ORDER — HYDROMORPHONE HYDROCHLORIDE 1 MG/ML
INJECTION, SOLUTION INTRAMUSCULAR; INTRAVENOUS; SUBCUTANEOUS AS NEEDED
Status: DISCONTINUED | OUTPATIENT
Start: 2020-08-27 | End: 2020-08-27 | Stop reason: HOSPADM

## 2020-08-27 RX ORDER — MIDAZOLAM HYDROCHLORIDE 1 MG/ML
INJECTION, SOLUTION INTRAMUSCULAR; INTRAVENOUS
Status: SHIPPED | OUTPATIENT
Start: 2020-08-27 | End: 2020-08-27

## 2020-08-27 RX ORDER — METOCLOPRAMIDE HYDROCHLORIDE 5 MG/ML
10 INJECTION INTRAMUSCULAR; INTRAVENOUS
Status: DISCONTINUED | OUTPATIENT
Start: 2020-08-27 | End: 2020-08-27 | Stop reason: HOSPADM

## 2020-08-27 RX ORDER — DEXAMETHASONE SODIUM PHOSPHATE 4 MG/ML
4 INJECTION, SOLUTION INTRA-ARTICULAR; INTRALESIONAL; INTRAMUSCULAR; INTRAVENOUS; SOFT TISSUE ONCE
Status: COMPLETED | OUTPATIENT
Start: 2020-08-27 | End: 2020-08-27

## 2020-08-27 RX ORDER — CELECOXIB 100 MG/1
200 CAPSULE ORAL
Status: COMPLETED | OUTPATIENT
Start: 2020-08-27 | End: 2020-08-27

## 2020-08-27 RX ORDER — SODIUM CHLORIDE 0.9 % (FLUSH) 0.9 %
5-40 SYRINGE (ML) INJECTION EVERY 8 HOURS
Status: DISCONTINUED | OUTPATIENT
Start: 2020-08-27 | End: 2020-08-27 | Stop reason: HOSPADM

## 2020-08-27 RX ORDER — GLYCOPYRROLATE 0.2 MG/ML
INJECTION INTRAMUSCULAR; INTRAVENOUS AS NEEDED
Status: DISCONTINUED | OUTPATIENT
Start: 2020-08-27 | End: 2020-08-27 | Stop reason: HOSPADM

## 2020-08-27 RX ORDER — NALOXONE HYDROCHLORIDE 0.4 MG/ML
0.1 INJECTION, SOLUTION INTRAMUSCULAR; INTRAVENOUS; SUBCUTANEOUS AS NEEDED
Status: DISCONTINUED | OUTPATIENT
Start: 2020-08-27 | End: 2020-08-27 | Stop reason: HOSPADM

## 2020-08-27 RX ORDER — SODIUM CHLORIDE 0.9 % (FLUSH) 0.9 %
5-40 SYRINGE (ML) INJECTION AS NEEDED
Status: DISCONTINUED | OUTPATIENT
Start: 2020-08-27 | End: 2020-08-27 | Stop reason: HOSPADM

## 2020-08-27 RX ORDER — DOCUSATE SODIUM 100 MG/1
100 CAPSULE, LIQUID FILLED ORAL 2 TIMES DAILY
Status: DISCONTINUED | OUTPATIENT
Start: 2020-08-27 | End: 2020-08-27 | Stop reason: HOSPADM

## 2020-08-27 RX ORDER — PHENYLEPHRINE HCL IN 0.9% NACL 1 MG/10 ML
SYRINGE (ML) INTRAVENOUS AS NEEDED
Status: DISCONTINUED | OUTPATIENT
Start: 2020-08-27 | End: 2020-08-27 | Stop reason: HOSPADM

## 2020-08-27 RX ORDER — FENTANYL CITRATE 50 UG/ML
25 INJECTION, SOLUTION INTRAMUSCULAR; INTRAVENOUS
Status: DISCONTINUED | OUTPATIENT
Start: 2020-08-27 | End: 2020-08-27 | Stop reason: HOSPADM

## 2020-08-27 RX ORDER — KETOROLAC TROMETHAMINE 30 MG/ML
15 INJECTION, SOLUTION INTRAMUSCULAR; INTRAVENOUS EVERY 6 HOURS
Status: DISCONTINUED | OUTPATIENT
Start: 2020-08-27 | End: 2020-08-27 | Stop reason: HOSPADM

## 2020-08-27 RX ORDER — ASPIRIN 81 MG/1
81 TABLET ORAL 2 TIMES DAILY
Status: DISCONTINUED | OUTPATIENT
Start: 2020-08-27 | End: 2020-08-27 | Stop reason: HOSPADM

## 2020-08-27 RX ORDER — ACETAMINOPHEN 500 MG
1000 TABLET ORAL
Status: COMPLETED | OUTPATIENT
Start: 2020-08-27 | End: 2020-08-27

## 2020-08-27 RX ORDER — CEFAZOLIN SODIUM 2 G/50ML
2 SOLUTION INTRAVENOUS ONCE
Status: COMPLETED | OUTPATIENT
Start: 2020-08-27 | End: 2020-08-27

## 2020-08-27 RX ORDER — DEXAMETHASONE SODIUM PHOSPHATE 4 MG/ML
8 INJECTION, SOLUTION INTRA-ARTICULAR; INTRALESIONAL; INTRAMUSCULAR; INTRAVENOUS; SOFT TISSUE ONCE
Status: DISCONTINUED | OUTPATIENT
Start: 2020-08-27 | End: 2020-08-27

## 2020-08-27 RX ORDER — SODIUM CHLORIDE 9 MG/ML
300 INJECTION, SOLUTION INTRAVENOUS CONTINUOUS
Status: DISPENSED | OUTPATIENT
Start: 2020-08-27 | End: 2020-08-27

## 2020-08-27 RX ORDER — PROPOFOL 10 MG/ML
INJECTION, EMULSION INTRAVENOUS AS NEEDED
Status: DISCONTINUED | OUTPATIENT
Start: 2020-08-27 | End: 2020-08-27 | Stop reason: HOSPADM

## 2020-08-27 RX ORDER — DEXMEDETOMIDINE HYDROCHLORIDE 100 UG/ML
INJECTION, SOLUTION INTRAVENOUS
Status: SHIPPED | OUTPATIENT
Start: 2020-08-27 | End: 2020-08-27

## 2020-08-27 RX ORDER — ROSUVASTATIN CALCIUM 10 MG/1
10 TABLET, COATED ORAL
Status: DISCONTINUED | OUTPATIENT
Start: 2020-08-27 | End: 2020-08-27 | Stop reason: HOSPADM

## 2020-08-27 RX ORDER — LIDOCAINE HYDROCHLORIDE 20 MG/ML
INJECTION, SOLUTION EPIDURAL; INFILTRATION; INTRACAUDAL; PERINEURAL AS NEEDED
Status: DISCONTINUED | OUTPATIENT
Start: 2020-08-27 | End: 2020-08-27 | Stop reason: HOSPADM

## 2020-08-27 RX ORDER — MAGNESIUM SULFATE 100 %
4 CRYSTALS MISCELLANEOUS AS NEEDED
Status: DISCONTINUED | OUTPATIENT
Start: 2020-08-27 | End: 2020-08-27 | Stop reason: HOSPADM

## 2020-08-27 RX ORDER — ACETAMINOPHEN 325 MG/1
650 TABLET ORAL EVERY 6 HOURS
Status: DISCONTINUED | OUTPATIENT
Start: 2020-08-27 | End: 2020-08-27 | Stop reason: HOSPADM

## 2020-08-27 RX ORDER — PANTOPRAZOLE SODIUM 40 MG/1
40 TABLET, DELAYED RELEASE ORAL DAILY
Status: DISCONTINUED | OUTPATIENT
Start: 2020-08-27 | End: 2020-08-27

## 2020-08-27 RX ORDER — ONDANSETRON 2 MG/ML
4 INJECTION INTRAMUSCULAR; INTRAVENOUS
Status: DISCONTINUED | OUTPATIENT
Start: 2020-08-27 | End: 2020-08-27 | Stop reason: HOSPADM

## 2020-08-27 RX ORDER — BUPROPION HYDROCHLORIDE 150 MG/1
150 TABLET, EXTENDED RELEASE ORAL DAILY
Status: DISCONTINUED | OUTPATIENT
Start: 2020-08-27 | End: 2020-08-27 | Stop reason: HOSPADM

## 2020-08-27 RX ORDER — LANOLIN ALCOHOL/MO/W.PET/CERES
500 CREAM (GRAM) TOPICAL DAILY
Status: DISCONTINUED | OUTPATIENT
Start: 2020-08-27 | End: 2020-08-27 | Stop reason: HOSPADM

## 2020-08-27 RX ORDER — HYDROCODONE BITARTRATE AND ACETAMINOPHEN 5; 325 MG/1; MG/1
1 TABLET ORAL AS NEEDED
Status: DISCONTINUED | OUTPATIENT
Start: 2020-08-27 | End: 2020-08-27 | Stop reason: HOSPADM

## 2020-08-27 RX ORDER — GUAIFENESIN 100 MG/5ML
81 LIQUID (ML) ORAL 2 TIMES DAILY
Qty: 42 TAB | Refills: 0 | Status: SHIPPED | OUTPATIENT
Start: 2020-08-27 | End: 2020-09-17

## 2020-08-27 RX ORDER — ONDANSETRON 2 MG/ML
INJECTION INTRAMUSCULAR; INTRAVENOUS AS NEEDED
Status: DISCONTINUED | OUTPATIENT
Start: 2020-08-27 | End: 2020-08-27 | Stop reason: HOSPADM

## 2020-08-27 RX ORDER — MELOXICAM 7.5 MG/1
7.5 TABLET ORAL 2 TIMES DAILY
Qty: 28 TAB | Refills: 0 | Status: SHIPPED | OUTPATIENT
Start: 2020-08-27 | End: 2020-09-10

## 2020-08-27 RX ORDER — DIPHENHYDRAMINE HYDROCHLORIDE 50 MG/ML
12.5 INJECTION, SOLUTION INTRAMUSCULAR; INTRAVENOUS
Status: DISCONTINUED | OUTPATIENT
Start: 2020-08-27 | End: 2020-08-27 | Stop reason: HOSPADM

## 2020-08-27 RX ORDER — TRAZODONE HYDROCHLORIDE 100 MG/1
100 TABLET ORAL
Status: DISCONTINUED | OUTPATIENT
Start: 2020-08-27 | End: 2020-08-27 | Stop reason: HOSPADM

## 2020-08-27 RX ORDER — FLUOXETINE HYDROCHLORIDE 20 MG/1
60 CAPSULE ORAL DAILY
Status: DISCONTINUED | OUTPATIENT
Start: 2020-08-27 | End: 2020-08-27 | Stop reason: HOSPADM

## 2020-08-27 RX ORDER — ZOLPIDEM TARTRATE 5 MG/1
5-10 TABLET ORAL
Status: DISCONTINUED | OUTPATIENT
Start: 2020-08-27 | End: 2020-08-27 | Stop reason: HOSPADM

## 2020-08-27 RX ORDER — KETAMINE HCL IN 0.9 % NACL 50 MG/5 ML
SYRINGE (ML) INTRAVENOUS AS NEEDED
Status: DISCONTINUED | OUTPATIENT
Start: 2020-08-27 | End: 2020-08-27 | Stop reason: HOSPADM

## 2020-08-27 RX ORDER — CALCIUM CARBONATE 500(1250)
500 TABLET ORAL DAILY
Status: DISCONTINUED | OUTPATIENT
Start: 2020-08-27 | End: 2020-08-27 | Stop reason: HOSPADM

## 2020-08-27 RX ORDER — SODIUM CHLORIDE, SODIUM LACTATE, POTASSIUM CHLORIDE, CALCIUM CHLORIDE 600; 310; 30; 20 MG/100ML; MG/100ML; MG/100ML; MG/100ML
125 INJECTION, SOLUTION INTRAVENOUS CONTINUOUS
Status: DISCONTINUED | OUTPATIENT
Start: 2020-08-27 | End: 2020-08-27 | Stop reason: HOSPADM

## 2020-08-27 RX ORDER — ROPIVACAINE HYDROCHLORIDE 5 MG/ML
INJECTION, SOLUTION EPIDURAL; INFILTRATION; PERINEURAL
Status: SHIPPED | OUTPATIENT
Start: 2020-08-27 | End: 2020-08-27

## 2020-08-27 RX ORDER — NALOXONE HYDROCHLORIDE 0.4 MG/ML
0.4 INJECTION, SOLUTION INTRAMUSCULAR; INTRAVENOUS; SUBCUTANEOUS AS NEEDED
Status: DISCONTINUED | OUTPATIENT
Start: 2020-08-27 | End: 2020-08-27 | Stop reason: HOSPADM

## 2020-08-27 RX ORDER — LANOLIN ALCOHOL/MO/W.PET/CERES
1 CREAM (GRAM) TOPICAL 3 TIMES DAILY
Status: DISCONTINUED | OUTPATIENT
Start: 2020-08-27 | End: 2020-08-27 | Stop reason: HOSPADM

## 2020-08-27 RX ORDER — CEFADROXIL 500 MG/1
500 CAPSULE ORAL 2 TIMES DAILY
Qty: 10 CAP | Refills: 0 | Status: SHIPPED | OUTPATIENT
Start: 2020-08-27 | End: 2020-09-01

## 2020-08-27 RX ORDER — DEXAMETHASONE SODIUM PHOSPHATE 4 MG/ML
INJECTION, SOLUTION INTRA-ARTICULAR; INTRALESIONAL; INTRAMUSCULAR; INTRAVENOUS; SOFT TISSUE
Status: SHIPPED | OUTPATIENT
Start: 2020-08-27 | End: 2020-08-27

## 2020-08-27 RX ORDER — OXYCODONE HYDROCHLORIDE 5 MG/1
5-10 TABLET ORAL
Status: DISCONTINUED | OUTPATIENT
Start: 2020-08-27 | End: 2020-08-27 | Stop reason: HOSPADM

## 2020-08-27 RX ORDER — CEFAZOLIN SODIUM 2 G/50ML
2 SOLUTION INTRAVENOUS EVERY 8 HOURS
Status: DISCONTINUED | OUTPATIENT
Start: 2020-08-27 | End: 2020-08-27 | Stop reason: HOSPADM

## 2020-08-27 RX ORDER — SODIUM CHLORIDE, SODIUM LACTATE, POTASSIUM CHLORIDE, CALCIUM CHLORIDE 600; 310; 30; 20 MG/100ML; MG/100ML; MG/100ML; MG/100ML
150 INJECTION, SOLUTION INTRAVENOUS CONTINUOUS
Status: DISCONTINUED | OUTPATIENT
Start: 2020-08-27 | End: 2020-08-27 | Stop reason: HOSPADM

## 2020-08-27 RX ORDER — DEXTROSE MONOHYDRATE 100 MG/ML
125-250 INJECTION, SOLUTION INTRAVENOUS AS NEEDED
Status: DISCONTINUED | OUTPATIENT
Start: 2020-08-27 | End: 2020-08-27 | Stop reason: HOSPADM

## 2020-08-27 RX ORDER — OXYCODONE AND ACETAMINOPHEN 5; 325 MG/1; MG/1
1 TABLET ORAL
Qty: 60 TAB | Refills: 0 | Status: SHIPPED | OUTPATIENT
Start: 2020-08-27 | End: 2020-09-03

## 2020-08-27 RX ORDER — TRANEXAMIC ACID 650 1/1
1950 TABLET ORAL ONCE
Status: COMPLETED | OUTPATIENT
Start: 2020-08-27 | End: 2020-08-27

## 2020-08-27 RX ADMIN — Medication 50 MCG: at 09:31

## 2020-08-27 RX ADMIN — Medication 10 MG: at 09:49

## 2020-08-27 RX ADMIN — LIDOCAINE HYDROCHLORIDE 100 MG: 20 INJECTION, SOLUTION EPIDURAL; INFILTRATION; INTRACAUDAL; PERINEURAL at 09:12

## 2020-08-27 RX ADMIN — Medication 100 MCG: at 09:19

## 2020-08-27 RX ADMIN — DEXAMETHASONE SODIUM PHOSPHATE 4 MG: 4 INJECTION, SOLUTION INTRAMUSCULAR; INTRAVENOUS at 07:44

## 2020-08-27 RX ADMIN — DEXMEDETOMIDINE HYDROCHLORIDE 15 MCG: 100 INJECTION, SOLUTION INTRAVENOUS at 08:21

## 2020-08-27 RX ADMIN — ASPIRIN 81 MG: 81 TABLET, COATED ORAL at 12:07

## 2020-08-27 RX ADMIN — HYDROMORPHONE HYDROCHLORIDE 1 MG: 1 INJECTION, SOLUTION INTRAMUSCULAR; INTRAVENOUS; SUBCUTANEOUS at 09:08

## 2020-08-27 RX ADMIN — TRANEXAMIC ACID 1950 MG: 650 TABLET ORAL at 07:44

## 2020-08-27 RX ADMIN — ONDANSETRON 4 MG: 2 INJECTION INTRAMUSCULAR; INTRAVENOUS at 17:23

## 2020-08-27 RX ADMIN — ACETAMINOPHEN 650 MG: 325 TABLET ORAL at 12:07

## 2020-08-27 RX ADMIN — SODIUM CHLORIDE 300 ML/HR: 900 INJECTION, SOLUTION INTRAVENOUS at 12:00

## 2020-08-27 RX ADMIN — ONDANSETRON HYDROCHLORIDE 4 MG: 2 INJECTION INTRAMUSCULAR; INTRAVENOUS at 09:14

## 2020-08-27 RX ADMIN — MIDAZOLAM 2 MG: 1 INJECTION INTRAMUSCULAR; INTRAVENOUS at 08:21

## 2020-08-27 RX ADMIN — CEFAZOLIN SODIUM 2 G: 2 SOLUTION INTRAVENOUS at 16:06

## 2020-08-27 RX ADMIN — BUPROPION HYDROCHLORIDE 150 MG: 150 TABLET, EXTENDED RELEASE ORAL at 15:29

## 2020-08-27 RX ADMIN — SODIUM CHLORIDE, SODIUM LACTATE, POTASSIUM CHLORIDE, AND CALCIUM CHLORIDE 125 ML/HR: 600; 310; 30; 20 INJECTION, SOLUTION INTRAVENOUS at 07:42

## 2020-08-27 RX ADMIN — CALCIUM 500 MG: 500 TABLET ORAL at 15:29

## 2020-08-27 RX ADMIN — CYANOCOBALAMIN TAB 500 MCG 500 MCG: 500 TAB at 12:07

## 2020-08-27 RX ADMIN — ROPIVACAINE HYDROCHLORIDE 17 ML: 5 INJECTION, SOLUTION EPIDURAL; INFILTRATION; PERINEURAL at 08:21

## 2020-08-27 RX ADMIN — KETOROLAC TROMETHAMINE 15 MG: 30 INJECTION, SOLUTION INTRAMUSCULAR at 17:45

## 2020-08-27 RX ADMIN — MIDAZOLAM 2 MG: 1 INJECTION INTRAMUSCULAR; INTRAVENOUS at 09:08

## 2020-08-27 RX ADMIN — SODIUM CHLORIDE 500 ML: 900 INJECTION, SOLUTION INTRAVENOUS at 17:52

## 2020-08-27 RX ADMIN — GLYCOPYRROLATE 0.1 MG: 0.2 INJECTION INTRAMUSCULAR; INTRAVENOUS at 09:08

## 2020-08-27 RX ADMIN — KETOROLAC TROMETHAMINE 15 MG: 30 INJECTION, SOLUTION INTRAMUSCULAR at 12:08

## 2020-08-27 RX ADMIN — Medication 50 MCG: at 09:50

## 2020-08-27 RX ADMIN — CEFAZOLIN SODIUM 2 G: 2 SOLUTION INTRAVENOUS at 09:10

## 2020-08-27 RX ADMIN — SODIUM CHLORIDE, SODIUM LACTATE, POTASSIUM CHLORIDE, AND CALCIUM CHLORIDE 1000 ML: 600; 310; 30; 20 INJECTION, SOLUTION INTRAVENOUS at 07:42

## 2020-08-27 RX ADMIN — PROPOFOL 150 MG: 10 INJECTION, EMULSION INTRAVENOUS at 09:12

## 2020-08-27 RX ADMIN — DOCUSATE SODIUM 100 MG: 100 CAPSULE, LIQUID FILLED ORAL at 12:07

## 2020-08-27 RX ADMIN — FERROUS SULFATE TAB 325 MG (65 MG ELEMENTAL FE) 325 MG: 325 (65 FE) TAB at 15:29

## 2020-08-27 RX ADMIN — DEXAMETHASONE SODIUM PHOSPHATE 4 MG: 4 INJECTION, SOLUTION INTRAMUSCULAR; INTRAVENOUS at 08:21

## 2020-08-27 RX ADMIN — Medication 10 MG: at 09:30

## 2020-08-27 RX ADMIN — ACETAMINOPHEN 650 MG: 325 TABLET ORAL at 17:45

## 2020-08-27 RX ADMIN — Medication 50 MCG: at 09:36

## 2020-08-27 RX ADMIN — ACETAMINOPHEN 1000 MG: 500 TABLET ORAL at 07:44

## 2020-08-27 RX ADMIN — CELECOXIB 200 MG: 100 CAPSULE ORAL at 07:44

## 2020-08-27 RX ADMIN — Medication 50 MCG: at 09:33

## 2020-08-27 RX ADMIN — SODIUM CHLORIDE 125 ML/HR: 900 INJECTION, SOLUTION INTRAVENOUS at 17:23

## 2020-08-27 NOTE — OP NOTES
[address] [address],[address]  Total Knee Arthroplasty    Patient: Leyla Castañeda MRN: [id number]  SSN: [id number]    YOB: 1950  Age: 79 y.o. Sex: female      Date of Surgery: 8/27/2020   Preoperative Diagnosis: LEFT KNEE OSTEOARTHRITIS   Postoperative Diagnosis: LEFT KNEE OSTEOARTHRITIS   Location: [facility]  Surgeon: July Espinosa MD  [facility]    Anesthesia: General and adductor canal Nerve Block    Procedure: Total Knee Arthroplasty:   The complexity of the total joint surgery requires the use of a first assistant for positioning, retraction and assistance in closure. Tourniquet Time: Tourniquet not used. Estimated Blood Loss: Less than 100cc     Implants:   Implant Name Type Inv. Item Serial No.  Lot No. LRB No. Used Action   CEMENT BNE SMARTSET HV 40GM -- ORDER IN SETS OF 20 - [id number]  CEMENT BNE SMARTSET HV 40GM -- ORDER IN SETS OF 20  [facility] [employer] [id number] Left 2 Implanted   TRAY TIB NP SZ3 -- BALANCED KNEE SYSTEM - [id number]  TRAY TIB NP SZ3 -- BALANCED KNEE SYSTEM  [employer] [id number] Left 1 Implanted   INSERT TIB CR SZ3 9MM -- BKS E-VITALIZE - [id number]  INSERT TIB CR SZ3 9MM -- BKS E-VITALIZE  [employer] [id number] Left 1 Implanted   IMPL CR FEM TYSHAWN NP SZ4 LT -- BKS TRIMAX - [id number]  IMPL CR FEM TYSHAWN NP SZ4 LT -- BKS TRIMAX  [employer] [id number] Left 1 Implanted   PATELLA 29MM -- BKS E-VITALIZE - [id number]  PATELLA 29MM -- BKS E-VITALIZE  ORTH[employer]OP[employer] GU[employer] [id number] Left 1 Implanted        Specimens: None    Additional Findings: None     Complications: none    Body Mass Index: Body mass index is 23.58 kg/m². Procedure Detail:  Prior to the surgery the patient was administered a femoral nerve block in the preoperative holding area by the anesthesiologist. Leyla Castañeda was brought to the operating room and positioned on the operating table.  She was anesthetized with anesthesia. Intravenous antibiotics were administered. Prior to the incision being made a timeout was called identifying the patient, procedure, operative side, and surgeon. A pneumatic tourniquet was placed about the limb and the left leg was prepped and draped in the usual sterile manner. The tourniquet was not inflated throughout the case. A midline anterior incision made over the knee. The incision was carried down through the subcutaneous tissue to the underlying capsule. A medial parapatellar capsular incision was performed. The medial capsular flap was carefully elevated around to the posterior medial corner protecting the medial collateral ligaments and the fibers. The patella was sized with a caliper, and approximately 10-12 mm was resected with an oscillating saw allowing the patella to be slid into the lateral gutter. It was not everted throughout the case. Our attention was first turned to the distal femur and using intermedullary instrumentation, a 5-degree valgus cut was on the distal end of the femur. The distal end of the femur was sized to a size 4N femoral component. Pins were inserted through the sizer and the corresponding 4-in-1 block was slid into place and pinned for stability. Anterior and posterior and chamfer cuts were made to accommodate the femoral component. The medial and lateral menisci were excised as were the anterior cruciate ligaments. Our attention was then turned to the tibia. Using extramedullary instrumentation, a 3-degree cut was made on the proximal end of the tibia. A spacer block was placed to show gaps had been balanced and a size 3  tibial base plate was placed on the tibia and pinned into place. Intramedullary reaming guide was placed on the tibia and the appropriate reamer was used followed by the keel punch to complete the preparation of the tibia. A trial femoral component was then impacted on to the distal end of the femur.  Trial reduction was then performed with incremental size trial bearing surfaces. The orthodevelopment BKS trimax CR 9mm bearing surface was inserted and allowed for full extension, good medial, lateral stability at 90 degrees of flexion, especially medially. Our attention was then turned to the patella. The patella was sized to a 29mm patella. The guide was pinned, placed over patella, 3 holes were drilled. Trial patella button was inserted and the patella was reduced in the knee. The patella tracked normally using no-touch technique. The trial components were then all removed. The real components were opened on the back. The cut surfaces of the bone were prepared using the PulsaVac lavage. Prior to this, the Aquamantys was used to cauterize any soft tissue bleeding. 40 grams of [employer] HV cement was mixed. The femoral and tibial components  and patellar component was cemented into place. Excess cement was removed from around the edge of bone using plastic curette. Once the cement had hardened, the knee was placed through range of motion and noted to be stable as mentioned above with the trail components. The wound was dry, therefore no drain was used. The operative knee was injected with 20 cc of exparel. The knee was then soaked with a diluted betadine solution for approximately 3 min. This was then thoroughly irrigated. The capsular layer was closed using a #2 stratafix suture with the knee flexed 90 degrees, while subcutaneous layers were closed using 2-0 Vicryl suture. Finally the skin was closed using Prineo, which were applied in occlusive fashion and sterile bandage applied. All sponge and needle counts were correct. She was taken to the recovery room extubated in stable condition.     Signed By: Fawn Bautista MD     August 27, 2020

## 2020-08-27 NOTE — ANESTHESIA PREPROCEDURE EVALUATION
Relevant Problems   No relevant active problems       Anesthetic History   No history of anesthetic complications            Review of Systems / Medical History  Patient summary reviewed, nursing notes reviewed and pertinent labs reviewed    Pulmonary  Within defined limits                 Neuro/Psych         Psychiatric history     Cardiovascular                  Exercise tolerance: >4 METS     GI/Hepatic/Renal     GERD           Endo/Other        Arthritis     Other Findings              Physical Exam    Airway  Mallampati: II  TM Distance: 4 - 6 cm  Neck ROM: normal range of motion   Mouth opening: Normal     Cardiovascular  Regular rate and rhythm,  S1 and S2 normal,  no murmur, click, rub, or gallop             Dental  No notable dental hx       Pulmonary  Breath sounds clear to auscultation               Abdominal  GI exam deferred       Other Findings            Anesthetic Plan    ASA: 2  Anesthesia type: general and regional - femoral single shot  Risk and benefits fully explained to the patient including bleeding, headache, nerve damage, infection, nausea, back pain, and hemodynamic changes. Patient understands and agrees to the procedure.     Post-op pain plan if not by surgeon: peripheral nerve block single    Induction: Intravenous  Anesthetic plan and risks discussed with: Patient

## 2020-08-27 NOTE — PERIOP NOTES
Pt. Used restroom in pre-op area with assistance. Patient placed on Justine Paws for a minimum of 30 min in  Preop.

## 2020-08-27 NOTE — PROGRESS NOTES
Assumed care of patient. Patient is alert and oriented x 4. Patient is calm and cooperative. Patient denies numbness or tingling to all extermities. pedal pulses palpable and equal bilaterally. Capillary Refill less than 3 seconds. Lung sounds clear bilaterally. Respirations even and unlabored. no use of accessory muscles. Abdomen is soft and non-tender. Bowel sounds active to x4 quadrants. Patient has not voided post-operatively. No bladder distention evident. No complaints of bladder discomfort. Skin is warm , dry and skin color is appropriate to race. mepilex dressing to L knee noted CDI. no other skin integrity issues present. Dimitri hose applied to bilaterally. Plexis applied to bilaterally. 18G IV intact to L forearm and infusing without difficulty. Reports pain 0/10. Call bell within reach. Bed in low position. Three side rails up. 1618-Pt had one occurrence of urine in bed pan. Sheets and gowns changed. 1712-Pt is ambulating in hallway with PT.    1723-Gave PRN Zofran for patient having nausea while walking. 1735-Paged Yulia Hamilton just to inform him that patients BP dropped to 111/44 and patient had to sit down when walking with PT. Pt did do steps and walked down the hallway with knee immobilizer. 1748-Order from [facility] for 500ml bolus before patient gets discharges.

## 2020-08-27 NOTE — ANESTHESIA PROCEDURE NOTES
Peripheral Block    Start time: 8/27/2020 8:18 AM  End time: 8/27/2020 8:21 AM  Performed by: James Alaniz MD  Authorized by: James Alaniz MD       Pre-procedure: Indications: at surgeon's request, post-op pain management and procedure for pain    Preanesthetic Checklist: patient identified, risks and benefits discussed, site marked, timeout performed, anesthesia consent given and patient being monitored      Block Type:   Block Type:   Adductor canal  Laterality:  Left  Monitoring:  Standard ASA monitoring, responsive to questions, continuous pulse ox, oxygen, frequent vital sign checks and heart rate  Injection Technique:  Single shot  Procedures: ultrasound guided    Patient Position: supine  Prep: chlorhexidine    Location:  Mid thigh  Needle Type:  Stimuplex  Needle Gauge:  20 G  Needle Localization:  Anatomical landmarks and ultrasound guidance    Assessment:  Number of attempts:  1  Injection Assessment:  Incremental injection every 5 mL, no paresthesia, negative aspiration for CSF, ultrasound image on chart, low pressure verified by pressure monitor, no intravascular symptoms, negative aspiration for blood and local visualized surrounding nerve on ultrasound  Patient tolerance:  Patient tolerated the procedure well with no immediate complications

## 2020-08-27 NOTE — PROGRESS NOTES
Problem: Mobility Impaired (Adult and Pediatric)  Goal: *Acute Goals and Plan of Care (Insert Text)  Description: PT goals to be met in 1 day:  Pt will be able to perform supine<>sit S for transfers at home. Pt will be able to perform sit<>stand S for increased ability to transfer at home safely. Pt will be able to participate in gt training >100' w/ RW, WBAT, GB and CGA/SBA for improved ability in home upon d/c. Pt will be able to perform stair training > 2 steps using step to pattern, B/U rail and CGA to obtain safe entry into home upon d/c. Pt will be educated regarding HEP per MD protocol for optimal AROM/strength outcomes. Note: [x]  Patient has met MD mobilization critieria for d/c home  [x]  Recommend HH with 24 hour adult care   []  Benefit from additional acute PT session to address:      PHYSICAL THERAPY EVALUATION    Patient: Harleen Hernandez (41 y.o. female)  Date: 8/27/2020  Primary Diagnosis: Localized osteoarthritis of left knee [M17.12]  Procedure(s) (LRB):  LEFT TOTAL KNEE REPLACEMENT (Left) Day of Surgery   Precautions:   Fall, WBAT    PLOF:     ASSESSMENT :  Based on the objective data described below, the patient presents with decreased mobility in regards to bed mobility, transfers, gt quality and tolerance, balance, stair negotiation and safety due to L TKA surgery. Decreased AROM of L knee, dec strength of L knee, pain in L knee, dec sensation of L knee also impacting pt functional mobility. Pt rating pain on numerical pain scale pre/post and during session 0/10. Pt and daughter in law ed regarding mobility safety, WB, environmental safety and home safe techniques. Pt cont to present w/ poor quad control, 2+/5 L. Pt required KI for L knee stability to prevent L knee buckling w/ WB. Pt able to perform supine<>sit w/ SBA/S and sit<>stand w/ CGA. Safety vc required throughout session to reinforce safety.   Pt able to participate in gt training using RW, GB, L KI, WBAT and CGA w/ antalgic gt pattern. Pt became light headed, nauseated and sweaty at end of gt training. /44 and pt was assisted to sitting in chair. Pt able to regain feeling well, /66. Pt was able to participate in stair training using step to pattern, ascending backwards to prevent L knee buckling, B rails and CGA. Answered questions by pt and daughter in law. Pt left supine in bed w/ all needs within reach and ice pack to L knee. XIOMARA Martin texted regarding session and BP. Nurse Alisha Cotton aware. Recommend HHPT with responsible adult care at least 24 hours upon hospital d/c. Recommended pt to wear KI until HHPT to prevent falls. Patient will benefit from skilled intervention to address the above impairments. Patient's rehabilitation potential is considered to be Good  Factors which may influence rehabilitation potential include:   []         None noted  []         Mental ability/status  [x]         Medical condition  []         Home/family situation and support systems  [x]         Safety awareness  [x]         Pain tolerance/management  []         Other:      PLAN :  Recommendations and Planned Interventions:   [x]           Bed Mobility Training             [x]    Neuromuscular Re-Education  [x]           Transfer Training                   []    Orthotic/Prosthetic Training  [x]           Gait Training                          []    Modalities  [x]           Therapeutic Exercises           []    Edema Management/Control  [x]           Therapeutic Activities            [x]    Family Training/Education  [x]           Patient Education  []           Other (comment):    Frequency/Duration: Patient will be followed by physical therapy twice daily to address goals. Discharge Recommendations: Home Health  Further Equipment Recommendations for Discharge: N/A     SUBJECTIVE:   Patient stated I am wanting to go home.     OBJECTIVE DATA SUMMARY:     Past Medical History:   Diagnosis Date    Anxiety disorder Arthritis     Basal joint arthritis, left thumb    GERD (gastroesophageal reflux disease)     Headache(784.0)     Hearing reduced     Left knee pain     Lower back pain July 2010    Menopause     Age 39    Osteoarthritis of left knee     Pain of left thumb     Primary localized osteoarthritis of left knee 8/17/2020    Psychiatric disorder     depression    Sacroiliitis ([facility] [address] 9/15/2010    SI (sacroiliac) joint dysfunction     Sinus trouble     Ulcer 2006    polyp, positive heme occult stools, denies ulcer     Past Surgical History:   Procedure Laterality Date    HX DILATION AND CURETTAGE      HX ORTHOPAEDIC  9/15/2010    Left SI joint injection 30mg Celestone and 4 cc Lidocaine    HX TUBAL LIGATION       Barriers to Learning/Limitations: yes;  anesthesia  Compensate with: Visual Cues, Verbal Cues, and Tactile Cues  Home Situation:  Home Situation  Home Environment: Private residence  # Steps to Enter: 3  Rails to Enter: Yes  Hand Rails : Bilateral  One/Two Story Residence: One story  Living Alone: Yes  Support Systems: Family member(s)  Patient Expects to be Discharged to[de-identified] Private residence  Current DME Used/Available at Home: Walker, rolling, Brace/Splint  Critical Behavior:  Neurologic State: Alert; Appropriate for age  Orientation Level: Oriented X4  Cognition: Appropriate decision making; Appropriate for age attention/concentration; Appropriate safety awareness; Follows commands  Safety/Judgement: Awareness of environment  Psychosocial  Patient Behaviors: Calm; Cooperative  Family  Behaviors: Supportive;Calm  Purposeful Interaction: Yes  Pt Identified Daily Priority: Clinical issues (comment)  Caritas Process: Nurture loving kindness;Establish trust;Teaching/learning; Attend basic human needs;Create healing environment  Caring Interventions: Reassure; Therapeutic modalities  Reassure: Therapeutic listening; Informing  Therapeutic Modalities: Humor  Skin Condition/Temp: Dry;Warm  Family  Behaviors: Supportive;Calm  Skin Integrity: Incision (comment)(L knee)  Skin Integumentary  Skin Color: Appropriate for ethnicity  Skin Condition/Temp: Dry;Warm  Skin Integrity: Incision (comment)(L knee)     Strength:    Strength: Generally decreased, functional(2/4 L quad control)                    Tone & Sensation:   Tone: Normal              Sensation: Intact               Range Of Motion:  AROM: Generally decreased, functional                       Posture:        Functional Mobility:  Bed Mobility:     Supine to Sit: Stand-by assistance;Supervision(vc)  Sit to Supine: Stand-by assistance(vc)  Scooting: Supervision(vc)  Transfers:  Sit to Stand: Contact guard assistance(vc)  Stand to Sit: Contact guard assistance(vc)                       Balance:   Sitting: Intact  Standing: Intact; With support  Wheelchair Mobility:        Ambulation/Gait Training:  Distance (ft): 100 Feet (ft)  Assistive Device: Walker, rolling;Gait belt;Brace/Splint  Ambulation - Level of Assistance: Contact guard assistance(vc)        Gait Abnormalities: Antalgic;Decreased step clearance(L knee varsha w/ WB needs KI at all times)     Left Side Weight Bearing: As tolerated  Base of Support: Shift to right  Stance: Left decreased  Speed/Sharon: Slow  Step Length: Left shortened;Right shortened  Swing Pattern: Left asymmetrical;Right asymmetrical     Interventions: Safety awareness training; Tactile cues; Verbal cues; Visual/Demos        Stairs:  Number of Stairs Trained: 2  Stairs - Level of Assistance: Contact guard assistance(vc)  Rail Use: Both     Therapeutic Exercises:   Encouraged HEP  Pain:  Pain level pre-treatment: 0/10   Pain level post-treatment: 0/10   Pain Intervention(s) : Medication (see MAR); Rest, Ice, Repositioning  Response to intervention: Nurse notified, See doc flow    Activity Tolerance:   Fair   Please refer to the flowsheet for vital signs taken during this treatment.   After treatment:   []         Patient left in no apparent distress sitting up in chair  [x]         Patient left in no apparent distress in bed  [x]         Call bell left within reach  [x]         Nursing notified  [x]         Caregiver present  []         Bed alarm activated  []         SCDs applied    COMMUNICATION/EDUCATION:   [x]         Role of Physical Therapy in the acute care setting. [x]         Fall prevention education was provided and the patient/caregiver indicated understanding. [x]         Patient/family have participated as able in goal setting and plan of care. [x]         Patient/family agree to work toward stated goals and plan of care. []         Patient understands intent and goals of therapy, but is neutral about his/her participation. []         Patient is unable to participate in goal setting/plan of care: ongoing with therapy staff.  []         Other:     Thank you for this referral.  Bere Parson, PT   Time Calculation: 51 mins      Eval Complexity: History: HIGH Complexity :3+ comorbidities / personal factors will impact the outcome/ POC Exam:MEDIUM Complexity : 3 Standardized tests and measures addressing body structure, function, activity limitation and / or participation in recreation  Presentation: LOW Complexity : Stable, uncomplicated  Clinical Decision Making:Low Complexity    Overall Complexity:LOW

## 2020-08-27 NOTE — DISCHARGE SUMMARY
[address] [address]     DISCHARGE SUMMARY     PATIENT: Mickey Dooley     MRN: [id number]   ADMIT DATE: 2020   [facility]LL[facility]   DISCHARGE DATE:      ATTENDING: Calvin Castle MD   DICTATING: XIOMARA Hansen         ADMISSION DIAGNOSIS: Localized osteoarthritis of left knee [M17.12]    DISCHARGE DIAGNOSIS: Status post LEFT TOTAL KNEE ARTHROPLASTY    HISTORY OF PRESENT ILLNESS: The patient is a 79y.o. year-old female   with ongoing left knee pain secondary to osteoarthritis of her left knee. The patient's pain has persisted and progressed despite conservative treatments and therapies. The patient has at this time opted for surgical intervention. PAST MEDICAL HISTORY:   Past Medical History:   Diagnosis Date    Anxiety disorder     Arthritis     Basal joint arthritis, left thumb    GERD (gastroesophageal reflux disease)     Headache(784.0)     Hearing reduced     Left knee pain     Lower back pain 2010    Menopause     Age 39    Osteoarthritis of left knee     Pain of left thumb     Primary localized osteoarthritis of left knee 2020    Psychiatric disorder     depression    Sacroiliitis ([address] 9/15/2010    SI (sacroiliac) joint dysfunction     Sinus trouble     Ulcer 2006    polyp, positive heme occult stools, denies ulcer       PAST SURGICAL HISTORY:   Past Surgical History:   Procedure Laterality Date    HX DILATION AND CURETTAGE      HX ORTHOPAEDIC  9/15/2010    Left SI joint injection 30mg Celestone and 4 cc Lidocaine    HX TUBAL LIGATION         ALLERGIES:   Allergies   Allergen Reactions    Topamax [Topiramate] Other (comments)     Whole body tingling  Blurry vision        CURRENT MEDICATIONS:  A list of medications prior to the time of admission include:  Prior to Admission medications    Medication Sig Start Date End Date Taking?  Authorizing Provider   aspirin 81 mg chewable tablet Take 1 Tab by mouth two (2) times a day for 21 days. 20 Yes Mario Cardozo PA   cefadroxil (DURICEF) 500 mg capsule Take 1 Cap by mouth two (2) times a day for 5 days. 20 Yes Mario Cardozo PA   meloxicam (MOBIC) 7.5 mg tablet Take 1 Tab by mouth two (2) times a day for 14 days. 8/27/20 9/10/20 Yes Mario Cardozo PA   oxyCODONE-acetaminophen (PERCOCET) 5-325 mg per tablet Take 1 Tab by mouth every four (4) hours as needed for Pain for up to 7 days. Max Daily Amount: 6 Tabs. 8/27/20 9/3/20 Yes Mario Cardozo PA   buPROPion SR (WELLBUTRIN SR) 150 mg SR tablet Take 150 mg by mouth daily. Indications: anxiousness associated with depression   Yes Provider, Historical   rosuvastatin (Crestor) 10 mg tablet Take 10 mg by mouth nightly. Yes Provider, Historical   krill-omega-3-dha-epa-lipids 666-42-96-18 mg cap Take  by mouth daily. Yes Provider, Historical   FLUoxetine (PROZAC) 20 mg capsule Take 60 mg by mouth daily. Indications: anxiousness associated with depression   Yes Provider, Historical   traZODone (DESYREL) 50 mg tablet Take 100 mg by mouth nightly. Yes Provider, Historical   ALPRAZolam (XANAX) 0.5 mg tablet Take  by mouth nightly as needed. Yes Provider, Historical   cyanocobalamin (VITAMIN B-12) 500 mcg tablet Take 500 mcg by mouth daily. Yes Provider, Historical   calcium 600 mg Cap Take  by mouth daily. Yes Provider, Historical   OMEPRAZOLE MAGNESIUM (PRILOSEC OTC PO) Take 40 mg by mouth daily.    Yes Provider, Historical       FAMILY HISTORY:   Family History   Problem Relation Age of Onset    Heart Failure Mother          at age 80 of congestive heart failure    Heart Disease Mother     Cancer Father          at age 54 of lung cancer; smoker and asbestos exposure       SOCIAL HISTORY:   Social History     Socioeconomic History    Marital status:      Spouse name: Not on file    Number of children: Not on file    Years of education: Not on file   [address] [place] [address] Highest education level: Not on file   Occupational History    Occupation: teacher's asst     Comment: full time   Tobacco Use    Smoking status: Never Smoker    Smokeless tobacco: Never Used    Tobacco comment:  SMOKED    Substance and Sexual Activity    Alcohol use: No     Alcohol/week: 0.0 standard drinks    Drug use: No    Sexual activity: Not Currently     Comment: BTL       REVIEW OF SYSTEMS: All review of systems are negative. PHYSICAL EXAMINATION: For a detailed physical exam, please refer to the patient's chart. HOSPITAL COURSE: The patient was taken to surgery the day of admission. she underwent a left total knee replacement. Operative course was benign. Estimated blood loss was approximately 150 cc. The patient was taken to the PACU in stable condition and was later taken to the floor in stable condition. During her hospital stay, the patient progressed well with physical therapy and occupational therapy, adherent to instructions. she had been cleared by physical therapy with stair training. she was placed on Aspirin for DVT prophylaxis. her pain has been well controlled with oral pain medications. her vitals have remained stable. she has also remained hemodynamically stable. The patient has been recommended for discharge home. DISCHARGE INSTRUCTIONS: The patient is to be discharged home. she is to continue on her prior medications per the medication reconciliation form, to which we will add:  1. Aspirin 81mg; 1 tablet po bid x 21 days  2. Mobic 7.5 mg; 1 tablet po bid x 14 days  3. Percocet 5/325 mg; 1 tablets p.o. every 4 hours p.r.n. for pain  4. Duricef 500 mg; 1 tablet p.o. every 12 hours x 5 days    The patient is to continue at home with home physical therapy 3 times a week to work on gait training, range of motion, strengthening, and weightbearing exercises as tolerated on her left lower extremity. The patient is to progress from a walker to a cane to complete total weightbearing as tolerable. The patient is to continue to keep her incision dry. The patient is to followup with Dr. Esther Acosta, Cyndi Solo PA-C and/or Mushtaq Rolon PA-C in the office approximately 10-14 days status post for x-rays and further evaluation.     Mary Bennett [address] [place] [address]  3/46/242624:15 AM

## 2020-08-27 NOTE — PROGRESS NOTES
PT orders received and chart reviewed. Pt w/ continued L knee block effects and unable to perform functional SLR or LAQ to safely participate w/ PT eval.  Will return later when pt is appropriate. Nurse Micky Hopkins aware.

## 2020-08-27 NOTE — PERIOP NOTES
Patient accepted by Sonido Choi, RN   Visit Vitals  /55   Pulse 80   Temp 97.9 °F (36.6 °C)   Resp 12   Ht 5' 4\" (1.626 m)   Wt 62.3 kg (137 lb 6 oz)   SpO2 99%   BMI 23.58 kg/m²

## 2020-08-27 NOTE — ROUTINE PROCESS
TRANSFER - IN REPORT:    Verbal report received from Steven Roman RN(name) on Rosio Ritter  being received from [facility]) for routine post - op      Report consisted of patients Situation, Background, Assessment and   Recommendations(SBAR). Information from the following report(s) SBAR, Kardex, [facility] and Recent Results was reviewed with the receiving nurse. Opportunity for questions and clarification was provided. Assessment completed upon patients arrival to unit and care assumed.

## 2020-08-27 NOTE — PROGRESS NOTES
Pt cont to have inability to perform functional SLR or LAQ due to continued block effects. Will continue to monitor and will perform eval as pt is appropriate. Nurse Delia Hobbs aware and PA [facility] aware.

## 2020-08-27 NOTE — ANESTHESIA POSTPROCEDURE EVALUATION
Procedure(s):  LEFT TOTAL KNEE REPLACEMENT. general, regional    Anesthesia Post Evaluation      Multimodal analgesia: multimodal analgesia used between 6 hours prior to anesthesia start to PACU discharge  Patient location during evaluation: PACU  Patient participation: complete - patient participated  Level of consciousness: awake  Pain management: adequate  Airway patency: patent  Anesthetic complications: no  Cardiovascular status: acceptable  Respiratory status: acceptable  Hydration status: acceptable  Post anesthesia nausea and vomiting:  none  Final Post Anesthesia Temperature Assessment:  Normothermia (36.0-37.5 degrees C)      INITIAL Post-op Vital signs:   Vitals Value Taken Time   /51 8/27/2020 10:49 AM   Temp 36.7 °C (98 °F) 8/27/2020 10:32 AM   Pulse 81 8/27/2020 10:55 AM   Resp 11 8/27/2020 10:55 AM   SpO2 99 % 8/27/2020 10:55 AM   Vitals shown include unvalidated device data.

## 2020-08-27 NOTE — PERIOP NOTES
Intake/Output Summary (Last 24 hours) at 8/27/2020 1119  Last data filed at 8/27/2020 1037  Gross per 24 hour   Intake 1850 ml   Output 50 ml   Net 1800 ml   TRANSFER - OUT REPORT:    Verbal report given to Alisha Cotton RN(name) on Dawit Rosario  being transferred to [facility](unit) for routine progression of care       Report consisted of patients Situation, Background, Assessment and   Recommendations(SBAR). Information from the following report(s) Procedure Summary and Intake/Output was reviewed with the receiving nurse. Lines:   Peripheral IV 08/27/20 Left Forearm (Active)   Site Assessment Clean, dry, & intact 08/27/20 1037   Phlebitis Assessment 0 08/27/20 1037   Infiltration Assessment 0 08/27/20 1037   Dressing Status Clean, dry, & intact 08/27/20 1037   Dressing Type Tape;Transparent 08/27/20 1037   Hub Color/Line Status Infusing 08/27/20 1037   Action Taken Armboard 08/27/20 3256        Opportunity for questions and clarification was provided.       Patient transported with:   O2 @ 0 liters Also reviewed history as recorded in EMR

## 2020-08-27 NOTE — INTERVAL H&P NOTE
Update History & Physical    The Patient's History and Physical of August 17,2020,     was reviewed with the patient and I examined the patient. There was no change. The surgical site was confirmed by the patient and me. Plan:  The risk, benefits, expected outcome, and alternative to the recommended procedure have been discussed with the patient. Patient understands and wants to proceed with the procedure.     Electronically signed by Mindy Heath MD on 8/27/2020 at 7:08 AM

## 2020-08-27 NOTE — ROUTINE PROCESS
Dual AVS reviewed with Richa Madrid RN. All medications reviewed individually with patient. Opportunities for questions and concerns provided. Patient to be discharged via (mode of transport ie. Car, ambulance or air transport) car. Patient's arm band appropriately discarded.

## 2020-08-27 NOTE — PROGRESS NOTES
Problem: Self Care Deficits Care Plan (Adult)  Goal: *Acute Goals and Plan of Care (Insert Text)  Description: Initial Occupational Therapy Goals (8/27/2020) Within 7 day(s):    1. Patient will perform grooming standing sinkside with supervision for increased independence in ADLs. 2. Patient will perform UB dressing with supervision seated EOB for increased independence with ADLs. 3. Patient will perform LB dressing with supervision & A/E PRN for increased independence with ADLs. 4. Patient will perform all aspects of toileting with supervision for increased independence with ADLs. 5. Patient will perform LB ADLs utilizing body mechanics & adaptive strategies with 1 verbal cue for increased safety in ADLs. 6. Patient will independently apply energy conservation techniques with 1 verbal cue(s)for increased independence with ADLs. Outcome: Progressing Towards Goal   OCCUPATIONAL THERAPY EVALUATION    Patient: Jeny Moreno (22 y.o. female)  Date: 8/27/2020  Primary Diagnosis: Localized osteoarthritis of left knee [M17.12]  Procedure(s) (LRB):  LEFT TOTAL KNEE REPLACEMENT (Left) Day of Surgery   Precautions:  Fall, WBAT  PLOF: independent with ADL's and transfers. ASSESSMENT AND RECOMMENDATIONS:  Based on the objective data described below, the patient presents with decreased ROM and strength affecting LE ADLs. PT and OT did co-eval with pt for a 2nd set of skilled hands to ensure pt's safety with OOB activity. Pt received in supine. Pt rated her pain level a 1/10 at rest. Pt's daughter in law present for session. Pt continued to demonstrate poor quad activation/control so KI applied to her knee in supine. Pt did supine to sit to the left side with SBA and HOB elevated slightly. Pt donned her clothes seated on EOB in preparation for home. Pt did UB dressing with setup. Pt did LB dressing with Min A seated on EOB and standing with RW. Patient able to bend forward to don pants.  Pt will require assist w/ compression hose which patient reports that her family can assist. Pt did sit to stand from EOB with Min A and vc's for safety/hand placement. Pt ambulated in hallway with PT and RW. Towards the end of ambulation, pt became lightheaded, nauseated and sweaty. Pt was assisted to sitting in chair and BP assessed and it was 111/44. Pt wheeled back to her room in wc and assisted back to bed with Min A. Pt left resting in bed with call light in reach and all needs met. Recommended that pt wear KI until [facility] PT is able to assess LE functioning. Pt and daughter in law verbalized understanding. Patient will benefit from skilled Occupational Therapy intervention to increase independence with ADL's and transfers. Education: Reviewed home safety, body mechanics, importance of moving every hour to prevent joint stiffness, role of ice for edema/pain control, Rolling Walker management/safety, and adaptive dressing techniques with patient verbalizing  understanding at this time     Patient will benefit from skilled intervention to address the above impairments.   Patient's rehabilitation potential is considered to be Good  Factors which may influence rehabilitation potential include:   []             None noted  []             Mental ability/status  [x]             Medical condition  []             Home/family situation and support systems  [x]             Safety awareness  [x]             Pain tolerance/management  []             Other:        PLAN :  Recommendations and Planned Interventions:   [x]               Self Care Training                  [x]      Therapeutic Activities  [x]               Functional Mobility Training   []      Cognitive Retraining  [x]               Therapeutic Exercises           [x]      Endurance Activities  [x]               Balance Training                    []      Neuromuscular Re-Education  []               Visual/Perceptual Training     [x]      Home Safety Training  [x]               Patient Education                   [x]      Family Training/Education  []               Other (comment):    Frequency/Duration: Patient will be followed by Occupational Therapy 1-2 times per day/3-5 days per week to address goals. Discharge Recommendations: Home Health  Further Equipment Recommendations for Discharge: N/A     SUBJECTIVE:   Patient stated Summer Ramirez still can't lift my leg.     OBJECTIVE DATA SUMMARY:     Past Medical History:   Diagnosis Date    Anxiety disorder     Arthritis     Basal joint arthritis, left thumb    GERD (gastroesophageal reflux disease)     Headache(784.0)     Hearing reduced     Left knee pain     Lower back pain July 2010    Menopause     Age 39    Osteoarthritis of left knee     Pain of left thumb     Primary localized osteoarthritis of left knee 8/17/2020    Psychiatric disorder     depression    Sacroiliitis ([address] 9/15/2010    SI (sacroiliac) joint dysfunction     Sinus trouble     Ulcer 2006    polyp, positive heme occult stools, denies ulcer     Past Surgical History:   Procedure Laterality Date    HX DILATION AND CURETTAGE      HX ORTHOPAEDIC  9/15/2010    Left SI joint injection 30mg Celestone and 4 cc Lidocaine    HX TUBAL LIGATION       Barriers to Learning/Limitations: yes;  physical  Compensate with: visual, verbal, tactile, kinesthetic cues/model    Home Situation/Prior Level of Function:   Home Situation  Home Environment: Private residence  # Steps to Enter: 3  Rails to Enter: Yes  Hand Rails : Bilateral  One/Two Story Residence: One story  Living Alone: Yes  Support Systems: Family member(s)  Patient Expects to be Discharged to[de-identified] Private residence  Current DME Used/Available at Home: Walker, rolling, Brace/Splint  Tub or Shower Type: Tub/Shower combination  [x]  Right hand dominant   []  Left hand dominant    Cognitive/Behavioral Status:  Neurologic State: Alert; Appropriate for age  Orientation Level: Oriented X4  Cognition: Appropriate decision making; Appropriate for age attention/concentration; Appropriate safety awareness; Follows commands  Safety/Judgement: Awareness of environment    Skin: left knee incision w/ Orthofoam  Edema: compression hose in place & applied ice     Vision/Perceptual:  Corrective Lenses: Glasses    Coordination: BUE  Coordination: Generally decreased, functional  Fine Motor Skills-Upper: Left Intact; Right Intact    Gross Motor Skills-Upper: Left Intact; Right Intact    Balance:  Sitting: Intact  Standing: Intact; With support    Strength: BUE  Strength: Generally decreased, functional(2/4 L quad control)     Tone & Sensation:BUE  Tone: Normal  Sensation: Intact     Range of Motion: BUE  AROM: Generally decreased, functional     Functional Mobility and Transfers for ADLs:  Bed Mobility:  Supine to Sit: Stand by assistance  Sit to Supine: Stand-by assistance(vc)  Scooting: Stand by assistance  Transfers:  Sit to Stand: Minimum assistance     ADL Assessment:  Feeding: Independent  Oral Facial Hygiene/Grooming: Stand-by assistance  Bathing: Minimum assistance  Upper Body Dressing: Setup  Lower Body Dressing: Minimum assistance  Toileting: Minimum assistance     ADL Intervention:   Upper Body Dressing Assistance  Dressing Assistance: Set-up  Bra: Set-up  Pullover Shirt: Set-up    Lower Body Dressing Assistance  Dressing Assistance: Minimum assistance  Underpants: Minimum assistance  Pants With Elastic Waist: Minimum assistance  Leg Crossed Method Used: No  Position Performed: Seated edge of bed;Standing  Cues: Don;Verbal cues provided    Cognitive Retraining  Safety/Judgement: Awareness of environment    Pain:  Pain level pre-treatment: 1/10  Pain level post-treatment: 1/10  Pain Intervention(s): Medication administer by Nursing (see MAR); Rest, Ice, Repositioning   Response to intervention: Nurse notified, see doc flow    Activity Tolerance:   Fair. Patient able to stand 2 minute(s). Patient able to complete ADLs with intermittent rest breaks.  Patient limited by decreased LE strength, balance, safety, activity tolerance, and low BP. Patient unsteady. Please refer to the flowsheet for vital signs taken during this treatment. After treatment:   []  Patient left in no apparent distress sitting up in chair  []  Patient sitting on EOB  [x]  Patient left in no apparent distress in bed  [x]  Call bell left within reach  [x]  Nursing notified  []  Caregiver present  [x]  Ice applied  []  SCD's on while back in bed  [] Bed alarm activated    COMMUNICATION/EDUCATION:   Communication/Collaboration:  [x]       Role of Occupational Therapy in the acute care setting. [x]      Home safety education was provided and the patient/caregiver indicated understanding. [x]      Patient/family have participated as able in goal setting and plan of care. [x]      Patient/family agree to work toward stated goals and plan of care. []      Patient understands intent and goals of therapy, but is neutral about his/her participation. []      Patient is unable to participate in plan of care at this time. Thank you for this referral.  Luis Wheat OTR/L MOT  Time Calculation: 48 mins    Eval Complexity: History: MEDIUM Complexity : Expanded review of history including physical, cognitive and psychosocial  history ; Examination: MEDIUM Complexity : 3-5 performance deficits relating to physical, cognitive , or psychosocial skils that result in activity limitations and / or participation restrictions; Decision Making:MEDIUM Complexity : Patient may present with comorbidities that affect occupational performnce.  Miniml to moderate modification of tasks or assistance (eg, physical or verbal ) with assesment(s) is necessary to enable patient to complete evaluation

## 2020-08-27 NOTE — HOME CARE
Spoke with patient via phone. Verified address, telephone number. Explained home care services and routine. Orders noted and arranged.      Eliane Love LPN

## 2020-08-28 ENCOUNTER — HOME CARE VISIT (OUTPATIENT)
Dept: SCHEDULING | Facility: HOME HEALTH | Age: 70
End: 2020-08-28
Payer: MEDICARE

## 2020-08-28 ENCOUNTER — TELEPHONE (OUTPATIENT)
Dept: OTHER | Age: 70
End: 2020-08-28

## 2020-08-28 ENCOUNTER — HOME CARE VISIT (OUTPATIENT)
Dept: HOME HEALTH SERVICES | Facility: HOME HEALTH | Age: 70
End: 2020-08-28

## 2020-08-28 VITALS
DIASTOLIC BLOOD PRESSURE: 68 MMHG | TEMPERATURE: 97.5 F | SYSTOLIC BLOOD PRESSURE: 130 MMHG | OXYGEN SATURATION: 97 % | RESPIRATION RATE: 18 BRPM | HEART RATE: 84 BPM

## 2020-08-28 VITALS
OXYGEN SATURATION: 97 % | SYSTOLIC BLOOD PRESSURE: 120 MMHG | DIASTOLIC BLOOD PRESSURE: 60 MMHG | TEMPERATURE: 98.3 F | HEART RATE: 86 BPM

## 2020-08-28 PROCEDURE — 3331090002 HH PPS REVENUE DEBIT

## 2020-08-28 PROCEDURE — 3331090001 HH PPS REVENUE CREDIT

## 2020-08-28 PROCEDURE — G0299 HHS/HOSPICE OF RN EA 15 MIN: HCPCS

## 2020-08-28 PROCEDURE — G0151 HHCP-SERV OF PT,EA 15 MIN: HCPCS

## 2020-08-28 PROCEDURE — 400013 HH SOC

## 2020-08-28 NOTE — TELEPHONE ENCOUNTER
Nik Lemons post total knee replacement follow up call. [address] at the house. Discussed using ice, distraction, and repositioning to manage pain besides using medication. Reminded patient not to get the wound wet and to cover it before bathing. Reminded patient the importance of doing exercises as shown. Reminded patient to change positions frequently and walking at least 3-4 times each day to promote circulation, decrease stiffness and soreness. Reinforced increased swelling, bruising and pain are normal after surgery when at home. Educated to lie down and raise leg while straight on pillows above heart level to help decrease swelling too. Reminded to healthy eat foods along with drinking plenty of fluids to promote healing. Reminded not  to take medication on an empty stomach to prevent nausea. Reminded to take a stool softener while taking a narcotic due to constipation being a side effect of anesthesia and narcotics. Taking medications as prescribed by provider. Nik Lemons knows when their follow up appointment is.       Orthopaedic Navigator

## 2020-08-29 ENCOUNTER — HOME CARE VISIT (OUTPATIENT)
Dept: SCHEDULING | Facility: HOME HEALTH | Age: 70
End: 2020-08-29
Payer: MEDICARE

## 2020-08-29 VITALS
TEMPERATURE: 97.4 F | HEART RATE: 76 BPM | OXYGEN SATURATION: 97 % | DIASTOLIC BLOOD PRESSURE: 72 MMHG | SYSTOLIC BLOOD PRESSURE: 136 MMHG

## 2020-08-29 PROCEDURE — 3331090001 HH PPS REVENUE CREDIT

## 2020-08-29 PROCEDURE — G0151 HHCP-SERV OF PT,EA 15 MIN: HCPCS

## 2020-08-29 PROCEDURE — 3331090002 HH PPS REVENUE DEBIT

## 2020-08-30 PROCEDURE — 3331090001 HH PPS REVENUE CREDIT

## 2020-08-30 PROCEDURE — 3331090002 HH PPS REVENUE DEBIT

## 2020-08-30 NOTE — PROGRESS NOTES
Therapy Functional Score Assessment  Question   Score   Grooming  2       Upper Dressing 1      Lower Dressing 2      Bathing  5      Toilet Transfer  1    Transfer  2      DL460m Mobility  Lying to sitting on side of bed  SOC 7  Goal    6          Ambulation  3   Dyspnea                     2       Pain Interfering with activity 4  Est number therapy visits      11

## 2020-08-31 ENCOUNTER — HOME CARE VISIT (OUTPATIENT)
Dept: SCHEDULING | Facility: HOME HEALTH | Age: 70
End: 2020-08-31
Payer: MEDICARE

## 2020-08-31 ENCOUNTER — HOME CARE VISIT (OUTPATIENT)
Dept: HOME HEALTH SERVICES | Facility: HOME HEALTH | Age: 70
End: 2020-08-31
Payer: MEDICARE

## 2020-08-31 VITALS
SYSTOLIC BLOOD PRESSURE: 110 MMHG | OXYGEN SATURATION: 90 % | HEART RATE: 82 BPM | RESPIRATION RATE: 17 BRPM | DIASTOLIC BLOOD PRESSURE: 60 MMHG | TEMPERATURE: 99 F

## 2020-08-31 PROCEDURE — 3331090001 HH PPS REVENUE CREDIT

## 2020-08-31 PROCEDURE — 3331090002 HH PPS REVENUE DEBIT

## 2020-08-31 PROCEDURE — G0157 HHC PT ASSISTANT EA 15: HCPCS

## 2020-09-01 PROCEDURE — 3331090002 HH PPS REVENUE DEBIT

## 2020-09-01 PROCEDURE — 3331090001 HH PPS REVENUE CREDIT

## 2020-09-02 ENCOUNTER — HOME CARE VISIT (OUTPATIENT)
Dept: SCHEDULING | Facility: HOME HEALTH | Age: 70
End: 2020-09-02
Payer: MEDICARE

## 2020-09-02 ENCOUNTER — HOME CARE VISIT (OUTPATIENT)
Dept: HOME HEALTH SERVICES | Facility: HOME HEALTH | Age: 70
End: 2020-09-02
Payer: MEDICARE

## 2020-09-02 PROCEDURE — 3331090001 HH PPS REVENUE CREDIT

## 2020-09-02 PROCEDURE — G0300 HHS/HOSPICE OF LPN EA 15 MIN: HCPCS

## 2020-09-02 PROCEDURE — 3331090002 HH PPS REVENUE DEBIT

## 2020-09-02 PROCEDURE — G0157 HHC PT ASSISTANT EA 15: HCPCS

## 2020-09-03 ENCOUNTER — HOME CARE VISIT (OUTPATIENT)
Dept: SCHEDULING | Facility: HOME HEALTH | Age: 70
End: 2020-09-03
Payer: MEDICARE

## 2020-09-03 VITALS
DIASTOLIC BLOOD PRESSURE: 80 MMHG | HEART RATE: 86 BPM | TEMPERATURE: 98.6 F | RESPIRATION RATE: 17 BRPM | SYSTOLIC BLOOD PRESSURE: 110 MMHG | OXYGEN SATURATION: 96 %

## 2020-09-03 PROCEDURE — G0300 HHS/HOSPICE OF LPN EA 15 MIN: HCPCS

## 2020-09-03 PROCEDURE — 3331090001 HH PPS REVENUE CREDIT

## 2020-09-03 PROCEDURE — 3331090002 HH PPS REVENUE DEBIT

## 2020-09-04 ENCOUNTER — HOME CARE VISIT (OUTPATIENT)
Dept: SCHEDULING | Facility: HOME HEALTH | Age: 70
End: 2020-09-04
Payer: MEDICARE

## 2020-09-04 PROCEDURE — G0157 HHC PT ASSISTANT EA 15: HCPCS

## 2020-09-04 PROCEDURE — 3331090001 HH PPS REVENUE CREDIT

## 2020-09-04 PROCEDURE — 3331090002 HH PPS REVENUE DEBIT

## 2020-09-05 PROCEDURE — 3331090001 HH PPS REVENUE CREDIT

## 2020-09-05 PROCEDURE — 3331090002 HH PPS REVENUE DEBIT

## 2020-09-06 PROCEDURE — 3331090002 HH PPS REVENUE DEBIT

## 2020-09-06 PROCEDURE — 3331090001 HH PPS REVENUE CREDIT

## 2020-09-07 ENCOUNTER — HOME CARE VISIT (OUTPATIENT)
Dept: SCHEDULING | Facility: HOME HEALTH | Age: 70
End: 2020-09-07
Payer: MEDICARE

## 2020-09-07 VITALS
OXYGEN SATURATION: 97 % | SYSTOLIC BLOOD PRESSURE: 114 MMHG | TEMPERATURE: 97.9 F | DIASTOLIC BLOOD PRESSURE: 70 MMHG | HEART RATE: 85 BPM | RESPIRATION RATE: 17 BRPM

## 2020-09-07 VITALS
RESPIRATION RATE: 16 BRPM | OXYGEN SATURATION: 97 % | TEMPERATURE: 98.4 F | SYSTOLIC BLOOD PRESSURE: 122 MMHG | DIASTOLIC BLOOD PRESSURE: 64 MMHG | HEART RATE: 67 BPM

## 2020-09-07 PROCEDURE — 3331090001 HH PPS REVENUE CREDIT

## 2020-09-07 PROCEDURE — G0299 HHS/HOSPICE OF RN EA 15 MIN: HCPCS

## 2020-09-07 PROCEDURE — 3331090002 HH PPS REVENUE DEBIT

## 2020-09-08 ENCOUNTER — HOME CARE VISIT (OUTPATIENT)
Dept: SCHEDULING | Facility: HOME HEALTH | Age: 70
End: 2020-09-08
Payer: MEDICARE

## 2020-09-08 VITALS
HEART RATE: 72 BPM | DIASTOLIC BLOOD PRESSURE: 72 MMHG | SYSTOLIC BLOOD PRESSURE: 110 MMHG | OXYGEN SATURATION: 98 % | RESPIRATION RATE: 16 BRPM | TEMPERATURE: 97.6 F

## 2020-09-08 PROCEDURE — G0157 HHC PT ASSISTANT EA 15: HCPCS

## 2020-09-08 PROCEDURE — 3331090001 HH PPS REVENUE CREDIT

## 2020-09-08 PROCEDURE — 3331090002 HH PPS REVENUE DEBIT

## 2020-09-09 ENCOUNTER — HOME CARE VISIT (OUTPATIENT)
Dept: SCHEDULING | Facility: HOME HEALTH | Age: 70
End: 2020-09-09
Payer: MEDICARE

## 2020-09-09 VITALS
SYSTOLIC BLOOD PRESSURE: 110 MMHG | RESPIRATION RATE: 18 BRPM | OXYGEN SATURATION: 96 % | TEMPERATURE: 98.4 F | HEART RATE: 97 BPM | DIASTOLIC BLOOD PRESSURE: 54 MMHG

## 2020-09-09 PROCEDURE — 3331090002 HH PPS REVENUE DEBIT

## 2020-09-09 PROCEDURE — 3331090001 HH PPS REVENUE CREDIT

## 2020-09-09 PROCEDURE — G0157 HHC PT ASSISTANT EA 15: HCPCS

## 2020-09-10 ENCOUNTER — HOME CARE VISIT (OUTPATIENT)
Dept: SCHEDULING | Facility: HOME HEALTH | Age: 70
End: 2020-09-10
Payer: MEDICARE

## 2020-09-10 VITALS
RESPIRATION RATE: 18 BRPM | HEART RATE: 84 BPM | DIASTOLIC BLOOD PRESSURE: 62 MMHG | OXYGEN SATURATION: 96 % | TEMPERATURE: 98.9 F | SYSTOLIC BLOOD PRESSURE: 118 MMHG

## 2020-09-10 PROCEDURE — 3331090002 HH PPS REVENUE DEBIT

## 2020-09-10 PROCEDURE — G0157 HHC PT ASSISTANT EA 15: HCPCS

## 2020-09-10 PROCEDURE — 3331090001 HH PPS REVENUE CREDIT

## 2020-09-11 ENCOUNTER — HOME CARE VISIT (OUTPATIENT)
Dept: SCHEDULING | Facility: HOME HEALTH | Age: 70
End: 2020-09-11
Payer: MEDICARE

## 2020-09-11 VITALS
SYSTOLIC BLOOD PRESSURE: 110 MMHG | DIASTOLIC BLOOD PRESSURE: 62 MMHG | TEMPERATURE: 97.4 F | RESPIRATION RATE: 17 BRPM | OXYGEN SATURATION: 98 % | HEART RATE: 105 BPM

## 2020-09-11 PROCEDURE — 3331090001 HH PPS REVENUE CREDIT

## 2020-09-11 PROCEDURE — A6213 FOAM DRG >16<=48 SQ IN W/BDR: HCPCS

## 2020-09-11 PROCEDURE — G0300 HHS/HOSPICE OF LPN EA 15 MIN: HCPCS

## 2020-09-11 PROCEDURE — 3331090002 HH PPS REVENUE DEBIT

## 2020-09-12 PROCEDURE — 3331090002 HH PPS REVENUE DEBIT

## 2020-09-12 PROCEDURE — 3331090001 HH PPS REVENUE CREDIT

## 2020-09-13 PROCEDURE — 3331090002 HH PPS REVENUE DEBIT

## 2020-09-13 PROCEDURE — 3331090001 HH PPS REVENUE CREDIT

## 2020-09-14 ENCOUNTER — HOME CARE VISIT (OUTPATIENT)
Dept: SCHEDULING | Facility: HOME HEALTH | Age: 70
End: 2020-09-14
Payer: MEDICARE

## 2020-09-14 PROCEDURE — 3331090001 HH PPS REVENUE CREDIT

## 2020-09-14 PROCEDURE — G0157 HHC PT ASSISTANT EA 15: HCPCS

## 2020-09-14 PROCEDURE — 3331090002 HH PPS REVENUE DEBIT

## 2020-09-15 VITALS — TEMPERATURE: 98.4 F | OXYGEN SATURATION: 96 % | HEART RATE: 90 BPM

## 2020-09-15 VITALS
DIASTOLIC BLOOD PRESSURE: 63 MMHG | RESPIRATION RATE: 18 BRPM | TEMPERATURE: 97.1 F | HEART RATE: 61 BPM | SYSTOLIC BLOOD PRESSURE: 117 MMHG | OXYGEN SATURATION: 98 %

## 2020-09-15 PROCEDURE — 3331090001 HH PPS REVENUE CREDIT

## 2020-09-15 PROCEDURE — 3331090002 HH PPS REVENUE DEBIT

## 2020-09-16 ENCOUNTER — HOME CARE VISIT (OUTPATIENT)
Dept: SCHEDULING | Facility: HOME HEALTH | Age: 70
End: 2020-09-16
Payer: MEDICARE

## 2020-09-16 PROCEDURE — G0157 HHC PT ASSISTANT EA 15: HCPCS

## 2020-09-16 PROCEDURE — 3331090001 HH PPS REVENUE CREDIT

## 2020-09-16 PROCEDURE — 3331090002 HH PPS REVENUE DEBIT

## 2020-09-17 VITALS
SYSTOLIC BLOOD PRESSURE: 118 MMHG | RESPIRATION RATE: 17 BRPM | DIASTOLIC BLOOD PRESSURE: 70 MMHG | HEART RATE: 82 BPM | OXYGEN SATURATION: 95 % | TEMPERATURE: 98.2 F

## 2020-09-17 PROCEDURE — 3331090001 HH PPS REVENUE CREDIT

## 2020-09-17 PROCEDURE — 3331090002 HH PPS REVENUE DEBIT

## 2020-09-18 ENCOUNTER — HOME CARE VISIT (OUTPATIENT)
Dept: SCHEDULING | Facility: HOME HEALTH | Age: 70
End: 2020-09-18
Payer: MEDICARE

## 2020-09-18 VITALS
HEART RATE: 91 BPM | TEMPERATURE: 98.5 F | SYSTOLIC BLOOD PRESSURE: 110 MMHG | DIASTOLIC BLOOD PRESSURE: 60 MMHG | OXYGEN SATURATION: 98 %

## 2020-09-18 PROCEDURE — G0151 HHCP-SERV OF PT,EA 15 MIN: HCPCS

## 2020-09-18 PROCEDURE — 3331090001 HH PPS REVENUE CREDIT

## 2020-09-18 PROCEDURE — 3331090002 HH PPS REVENUE DEBIT

## 2020-09-19 PROCEDURE — 3331090001 HH PPS REVENUE CREDIT

## 2020-09-19 PROCEDURE — 3331090002 HH PPS REVENUE DEBIT

## 2020-09-19 NOTE — PROGRESS NOTES
Patient has been discharged from [address] . She plans to continue therapy at [address] [address] to address R knee ROM impairment. All other HHPT goals were met. A discharge summary is available for your review.   Thank you for your referral.

## 2020-09-20 PROCEDURE — 3331090001 HH PPS REVENUE CREDIT

## 2020-09-20 PROCEDURE — 3331090002 HH PPS REVENUE DEBIT

## 2020-09-23 ENCOUNTER — HOSPITAL ENCOUNTER (OUTPATIENT)
Dept: PHYSICAL THERAPY | Age: 70
Discharge: HOME | End: 2020-09-23
Payer: MEDICARE

## 2020-09-23 PROCEDURE — 97161 PT EVAL LOW COMPLEX 20 MIN: CPT

## 2020-09-23 PROCEDURE — 97110 THERAPEUTIC EXERCISES: CPT

## 2020-09-23 NOTE — PROGRESS NOTES
[facility]  [address] [place] [address]  [phone] fax    Plan of Care/ Statement of Necessity for Physical Therapy Services    Patient name: Shilpa Philip Start of Care: 2020   Referral source: DarlinSo Nava : 1950    Medical Diagnosis: Pain in left knee [M25.562]  Payor: [place] MEDICARE / Plan: [place] MEDICARE PART A & B / Product Type: Medicare /  Onset Date: 20    Treatment Diagnosis:  Left knee pain   Prior Hospitalization: see medical history Provider#: [id number]   Medications: Verified on Patient summary List    Comorbidities: arthritis, depression   Prior Level of Function: Ind with ambulation, Ind with ADLs, lives alone      The Plan of Care and following information is based on the information from the initial evaluation. Assessment/ key information:  Pt. Is a 79year old female s/o left TKA on 20. She reports she had home health PT following her hospital stay. She reports she has been working on exercises at home and walks at home without an assistive device. She reports having difficulty with bending her knee, walking, and standing up. She ambulated into clinic with decreased weight shift to left side and decreased step length. Left knee AROM has significant limitations at 6-70 degrees with pain at end ranges. She also has decreased left knee strength and extension lag during SLR. She has decreased hamstring and quad flexibility. Left patella hypomobility. Pt. Also has decreased single leg balance at 1 second bilaterally. 30 second sit to stand test was 9x. Skilled PT is medically necessary in order to improve left knee mobility and strength for increased ease of ambulation and improved quality of life.      Evaluation Complexity History MEDIUM  Complexity : 1-2 comorbidities / personal factors will impact the outcome/ POC ; Examination MEDIUM Complexity : 3 Standardized tests and measures addressing body structure, function, activity limitation and / or participation in recreation  ;Presentation LOW Complexity : Stable, uncomplicated  ;Clinical Decision Making MEDIUM Complexity : FOTO score of 26-74  Overall Complexity Rating: LOW   Problem List: pain affecting function, decrease ROM, decrease strength, impaired gait/ balance, decrease ADL/ functional abilitiies, decrease activity tolerance, decrease flexibility/ joint mobility and decrease transfer abilities   Treatment Plan may include any combination of the following: Therapeutic exercise, Therapeutic activities, Neuromuscular re-education, Physical agent/modality, Gait/balance training, Manual therapy, Patient education, Self Care training, Functional mobility training and Home safety training  Patient / Family readiness to learn indicated by: asking questions  Persons(s) to be included in education: patient (P)  Barriers to Learning/Limitations: None  Patient Goal (s): to walk better  Patient Self Reported Health Status: excellent  Rehabilitation Potential: good    Short Term Goals: To be accomplished in 1 weeks:  1. Patient will demonstrate compliance with HEP in order to improve left knee mobility for increased ease of ambulation. Long Term Goals: To be accomplished in 4 weeks:  1. Patient will improve FOTO score by 15 points in order to demonstrate a significant improvement in function. 2. Patient will improve left knee AROM 0-100 degrees in order to increase ease of ambulation. 3. Patient will improve left knee MMT to 4+/5 in order to increase ease of ADLs. 4. Patient will improve 30 second sit to stand test to 10x in order to increase ease of transfers at home. Frequency / Duration: Patient to be seen 2 times per week for 4 weeks.     Patient/ Caregiver education and instruction: Diagnosis, prognosis, exercises   [x]  Plan of care has been reviewed with PTA    Certification Period:  9/23/20-10/22/20    Bib Larson, PT 9/23/2020 9:43 AM    ________________________________________________________________________    I certify that the above Therapy Services are being furnished while the patient is under my care. I agree with the treatment plan and certify that this therapy is necessary.     Physician's Signature:____________Date:_________TIME:________    ** Signature, Date and Time must be completed for valid certification **    Please sign and return to [facility]  [address] [place] [address]  [phone] fax

## 2020-09-23 NOTE — PROGRESS NOTES
PT DAILY TREATMENT NOTE/KNEE EVAL 10-18    Patient Name: Sisi Aggarwal  Date:2020  : 1950  [x]  Patient  Verified  Payor: [place] MEDICARE / Plan: VA MEDICARE PART A & B / Product Type: Medicare /    In time: 9:00  Out time: 9:39  Total Treatment Time (min): 39  Visit #: 1 of     Medicare/BCBS Only   Total Timed Codes (min):  8 1:1 Treatment Time:  39     Treatment Area: Pain in left knee [M25.562]    SUBJECTIVE  Pain Level (0-10 scale):  0/10 sitting 3/10 with walking  []constant []intermittent []improving []worsening []no change since onset    Any medication changes, allergies to medications, adverse drug reactions, diagnosis change, or new procedure performed?: [x] No    [] Yes (see summary sheet for update)  Subjective functional status/changes:       Mechanism of Injury:  20. Home health PT was done. And progressed to walking with no AD. Has been out walking a block 3x a day. Some tingling in front of knee, denies numbness. Increased time with transfers but can do them. Ind with ADLs. Biggest problem is difficulty bending her knee. Work Hx: retired  Living Situation:  Lives by herself   Pt Goals: to bend knee and walk without a limp      OBJECTIVE/EXAMINATION    8 min Therapeutic Exercise:  [x] See flow sheet : HEP   Rationale: increase ROM and increase strength to improve the patients ability to increase ease of ADLs          With   [x] TE   [] TA   [] neuro   [] other: Patient Education: [x] Review HEP    [] Progressed/Changed HEP based on:   [] positioning   [] body mechanics   [] transfers   [] heat/ice application    [] other:      Physical Therapy Evaluation - Knee      Gait:  [] Normal    [x] Abnormal    [] Antalgic    [] NWB    Device: none    Describe: decreased weight shift to left side.  Decreased step length    ROM / Strength  [] Unable to assess                  AROM                      PROM                   Strength (1-5)    Left Right Left Right Left Right   Hip Flexion 4 4+    Extension          Abduction          Adduction         Knee Flexion 70 140   4 5    Extension 6 -2   4 5   Ankle Plantarflexion     4+ 4+    Dorsiflexion     4+ 4+       Flexibility: [] Unable to assess at this time  Hamstrings:    (L) Tightness= [] WNL   [] Min   [x] Mod   [] Severe    (R) Tightness= [] WNL   [] Min   [] Mod   [] Severe  Quadriceps:    (L) Tightness= [] WNL   [] Min   [x] Mod   [] Severe    (R) Tightness= [] WNL   [] Min   [] Mod   [] Severe  Gastroc:      (L) Tightness= [] WNL   [] Min   [] Mod   [] Severe    (R) Tightness= [] WNL   [] Min   [] Mod   [] Severe  Other:    Palpation:   Neg/Pos  Neg/Pos  Neg/Pos   Joint Line x Quad tendon  Patellar ligament x   Patella x Fibular head  Pes Anserinus x   Tibial tubercle  Hamstring tendons x Infrapatellar fat pad x     Optional Tests:       Patellar Mobility   []L []R Hypermobile [x]L []R Hypomobile         Other tests/comments:  SLS: 1 second bilaterally  30 second sit to stand test: 9x    Pain Level (0-10 scale) post treatment: 3/10    ASSESSMENT/Changes in Function:      [x]  See Plan of Care  []  See progress note/recertification  []  See Discharge Summary         Progress towards goals / Updated goals:  See POC    PLAN  []  Upgrade activities as tolerated     [x]  Continue plan of care  []  Update interventions per flow sheet       []  Discharge due to:_  []  Other:_      Latasha Palomares, PT 9/23/2020  9:04 AM

## 2020-09-28 ENCOUNTER — HOSPITAL ENCOUNTER (OUTPATIENT)
Dept: PHYSICAL THERAPY | Age: 70
Discharge: HOME | End: 2020-09-28
Payer: MEDICARE

## 2020-09-28 PROCEDURE — 97140 MANUAL THERAPY 1/> REGIONS: CPT

## 2020-09-28 PROCEDURE — 97016 VASOPNEUMATIC DEVICE THERAPY: CPT

## 2020-09-28 PROCEDURE — 97110 THERAPEUTIC EXERCISES: CPT

## 2020-09-28 NOTE — PROGRESS NOTES
PT DAILY TREATMENT NOTE 10-18    Patient Name: Martínez Marking  Date:2020  : 1950  [x]  Patient  Verified  Payor: VA MEDICARE / Plan: VA MEDICARE PART A & B / Product Type: Medicare /    In time:1245  Out time:145  Total Treatment Time (min): 60  Visit #: 2 of 8    Medicare/BCBS Only   Total Timed Codes (min):  45 1:1 Treatment Time:  39       Treatment Area: Pain in left knee [M25.562]    SUBJECTIVE  Pain Level (0-10 scale): 0/10  Any medication changes, allergies to medications, adverse drug reactions, diagnosis change, or new procedure performed?: [x] No    [] Yes (see summary sheet for update)  Subjective functional status/changes:   [] No changes reported  Pt stated that she has no pain at the moment    OBJECTIVE    Modality rationale: decrease inflammation and decrease pain to improve the patients ability to increase ease with ADls   Min Type Additional Details    [] Estim:  []Unatt       []IFC  []Premod                        []Other:  []w/ice   []w/heat  Position:  Location:    [] Estim: []Att    []TENS instruct  []NMES                    []Other:  []w/US   []w/ice   []w/heat  Position:  Location:    []  Traction: [] Cervical       []Lumbar                       [] Prone          []Supine                       []Intermittent   []Continuous Lbs:  [] before manual  [] after manual    []  Ultrasound: []Continuous   [] Pulsed                           []1MHz   []3MHz W/cm2:  Location:    []  Iontophoresis with dexamethasone         Location: [] Take home patch   [] In clinic    []  Ice     []  heat  []  Ice massage  []  Laser   []  Anodyne Position:  Location:    []  Laser with stim  []  Other:  Position:  Location:   15 [x]  Vasopneumatic Device Pressure:       [x] lo [] med [] hi   Temperature: [x] lo [] med [] hi   [x] Skin assessment post-treatment:  [x]intact []redness- no adverse reaction    []redness  adverse reaction:     37 min Therapeutic Exercise:  [x] See flow sheet :   Rationale: increase ROM and increase strength to improve the patients ability to increase ease with ADls    8 min Manual Therapy:  Patella mobs   Rationale: decrease pain, increase ROM and increase tissue extensibility to increase ease with walking    With   [x] TE   [] TA   [] neuro   [] other: Patient Education: [x] Review HEP    [] Progressed/Changed HEP based on:   [] positioning   [] body mechanics   [] transfers   [] heat/ice application    [] other:      Other Objective/Functional Measures:   Had no complaint of increased pain during session   Has fair patella mobility  Program was challenging  Cont with very limited range in the left knee     Pain Level (0-10 scale) post treatment: 0/10    ASSESSMENT/Changes in Function:   Initiated therex today per flow sheet. Pt put forth good effort with all exercises. Pt reported compliance with HEP    Patient will continue to benefit from skilled PT services to modify and progress therapeutic interventions, address functional mobility deficits, address ROM deficits, address strength deficits, analyze and address soft tissue restrictions, analyze and cue movement patterns, analyze and modify body mechanics/ergonomics, address imbalance/dizziness and instruct in home and community integration to attain remaining goals. [x]  See Plan of Care  []  See progress note/recertification  []  See Discharge Summary         Progress towards goals / Updated goals:  Short Term Goals: To be accomplished in 1 weeks:  1. Patient will demonstrate compliance with HEP in order to improve left knee mobility for increased ease of ambulation.    goal met. 9/28/20    Long Term Goals: To be accomplished in 4 weeks:  1. Patient will improve FOTO score by 15 points in order to demonstrate a significant improvement in function. 2. Patient will improve left knee AROM 0-100 degrees in order to increase ease of ambulation. 3. Patient will improve left knee MMT to 4+/5 in order to increase ease of ADLs. 4. Patient will improve 30 second sit to stand test to 10x in order to increase ease of transfers at home.      PLAN  []  Upgrade activities as tolerated     [x]  Continue plan of care  []  Update interventions per flow sheet       []  Discharge due to:_  []  Other:_      Lupe Hilton, NIMO 9/28/2020  7:02 AM    Future Appointments   Date Time Provider Dewayne Bee   9/28/2020 12:45 PM Rachel Colon, PTA [facility]   9/30/2020 11:15 AM [facility]   10/2/2020  1:00 PM [facility] RM 4 3D [facility]   10/2/2020  2:15 PM Chela Verito [facility]   10/5/2020  2:15 PM Rachel Colon, PTA [facility]   10/7/2020 10:30 AM Rachel Colon, PTA [facility]   10/9/2020  9:00 AM Leva [place], PT [facility]   10/12/2020  9:45 AM Leva [place], PT [facility]   10/14/2020  9:45 AM Leva [place], PT [facility]   10/16/2020 10:30 AM Rachel Colon, PTA [facility]   10/19/2020  9:00 AM Rachel Colon, PTA [facility]   10/21/2020  9:45 AM Rachel Colon, PTA [facility]   10/23/2020  9:45 AM Rachel Colon, PTA [facility]

## 2020-09-30 ENCOUNTER — HOSPITAL ENCOUNTER (OUTPATIENT)
Dept: PHYSICAL THERAPY | Age: 70
Discharge: HOME | End: 2020-09-30
Payer: MEDICARE

## 2020-09-30 PROCEDURE — 97140 MANUAL THERAPY 1/> REGIONS: CPT

## 2020-09-30 PROCEDURE — 97110 THERAPEUTIC EXERCISES: CPT

## 2020-09-30 NOTE — PROGRESS NOTES
PT DAILY TREATMENT NOTE 10-18    Patient Name: Brain Kincaid  Date:2020  : 1950  [x]  Patient  Verified  Payor: VA MEDICARE / Plan: VA MEDICARE PART A & B / Product Type: Medicare /    In time:11:15  Out time:12:14  Total Treatment Time (min): 61  Visit #: 3 of 8    Medicare/BCBS Only   Total Timed Codes (min):  49 1:1 Treatment Time:  40       Treatment Area: Pain in left knee [M25.562]    SUBJECTIVE  Pain Level (0-10 scale): 1  Any medication changes, allergies to medications, adverse drug reactions, diagnosis change, or new procedure performed?: [x] No    [] Yes (see summary sheet for update)  Subjective functional status/changes:   [] No changes reported  \"I have almost not pain when I am walking, I am not using anything to help me walk either. \"    OBJECTIVE    Modality rationale: decrease pain to improve the patients ability to relax and sleep following therapy session to improve restorative sleep patterns.     Min Type Additional Details    [x] Estim:  []Unatt       []IFC  []Premod                        []Other:  []w/ice   []w/heat  Position:   Location:     [] Estim: []Att    []TENS instruct  []NMES                    []Other:  []w/US   []w/ice   []w/heat  Position:  Location:    []  Traction: [] Cervical       []Lumbar                       [] Prone          []Supine                       []Intermittent   []Continuous Lbs:  [] before manual  [] after manual    []  Ultrasound: []Continuous   [] Pulsed                           []1MHz   []3MHz W/cm2:  Location:    []  Iontophoresis with dexamethasone         Location: [] Take home patch   [] In clinic   10 [x]  Ice     []  heat  []  Ice massage  []  Laser   []  Anodyne Position: supine  Location: left knee    []  Laser with stim  []  Other:  Position:  Location:    []  Vasopneumatic Device Pressure:       [] lo [] med [] hi   Temperature: [] lo [] med [] hi   [x] Skin assessment post-treatment:  [x]intact []redness- no adverse reaction []redness  adverse reaction:       19 min Therapeutic Exercise:  [x] See flow sheet :   Rationale: increase ROM and increase strength to improve the patients ability to perform gait and transfers with improved LE strength and mobility. 30 min Manual Therapy:  Patellar mobs grade III all planes; scar massage, emphasis distal aspect of scar; TF mobs for increased extension with prolonged ovepressure; STM and edema massage to left distal thigh and proximal shank   Rationale: decrease pain, increase ROM, increase tissue extensibility and decrease trigger points to improve ease of ADLs after therapy session. With   [] TE   [] TA   [] neuro   [] other: Patient Education: [x] Review HEP    [] Progressed/Changed HEP based on:   [] positioning   [] body mechanics   [] transfers   [] heat/ice application    [] other:      Other Objective/Functional Measures: -Left knee flexion 5-80 deg following manual therapy     Pain Level (0-10 scale) post treatment: 0    ASSESSMENT/Changes in Function: Emphasis of today's on patient education, interventions, and manual therapy to progress patient's left knee flexion AROM as this continues to be significantly limited. She was able to progress her left knee flexion AROM today but continues to be limited by stiffness and a degree of fear avoidance. Will progress slowly as patient tolerates. Patient will continue to benefit from skilled PT services to modify and progress therapeutic interventions, address functional mobility deficits, address ROM deficits, address strength deficits, analyze and address soft tissue restrictions, analyze and cue movement patterns, analyze and modify body mechanics/ergonomics, assess and modify postural abnormalities and address imbalance/dizziness to attain remaining goals.      []  See Plan of Care  []  See progress note/recertification  []  See Discharge Summary         Progress towards goals / Updated goals:  Short Term Goals: To be accomplished in 1 weeks:  1. Patient will demonstrate compliance with HEP in order to improve left knee mobility for increased ease of ambulation.    goal met. 9/28/20     Long Term Goals: To be accomplished in 4 weeks:  1. Patient will improve FOTO score by 15 points in order to demonstrate a significant improvement in function. 2. Patient will improve left knee AROM 0-100 degrees in order to increase ease of ambulation. Current: progressing, left knee AROM 5-80 deg  3. Patient will improve left knee MMT to 4+/5 in order to increase ease of ADLs.    4. Patient will improve 30 second sit to stand test to 10x in order to increase ease of transfers at home.     PLAN  [x]  Upgrade activities as tolerated     [x]  Continue plan of care  []  Update interventions per flow sheet       []  Discharge due to:_  []  Other:_      Belita Sham 9/30/2020  9:36 AM    Future Appointments   Date Time Provider Dewayne Gamboa   9/30/2020 11:15 AM Iona Single [facility]   10/2/2020  1:00 PM [facility] BARB RM 4 3D [facility]   10/2/2020  2:15 PM Iona Single [facility]   10/5/2020  2:15 PM Silva Sanchez, PTA [facility]   10/6/2020  1:30 PM Jeferson Elizalde MD VS [facility]   10/7/2020 10:30 AM Silva Sanchez, PTA [facility]   10/9/2020  9:00 AM Kelli Genre, PT [facility]   10/12/2020  9:45 AM Kelli Genre, PT [facility]   10/14/2020  9:45 AM Kelli Genre, PT [facility]   10/16/2020 10:30 AM Silva Sanchez, PTA [facility]   10/19/2020  9:00 AM Silva Sanchez, PTA [facility]   10/21/2020  9:45 AM Silva Sanchez PTA [facility]   10/23/2020  9:45 AM Silva Sanchez PTA [facility]

## 2020-10-02 ENCOUNTER — HOSPITAL ENCOUNTER (OUTPATIENT)
Dept: MAMMOGRAPHY | Age: 70
Discharge: HOME | End: 2020-10-02
Attending: PHYSICIAN ASSISTANT
Payer: MEDICARE

## 2020-10-02 ENCOUNTER — HOSPITAL ENCOUNTER (OUTPATIENT)
Dept: PHYSICAL THERAPY | Age: 70
Discharge: HOME | End: 2020-10-02
Payer: MEDICARE

## 2020-10-02 DIAGNOSIS — Z12.31 ENCOUNTER FOR SCREENING MAMMOGRAM FOR BREAST CANCER: ICD-10-CM

## 2020-10-02 PROCEDURE — 77063 BREAST TOMOSYNTHESIS BI: CPT

## 2020-10-02 PROCEDURE — 97110 THERAPEUTIC EXERCISES: CPT

## 2020-10-02 PROCEDURE — 97140 MANUAL THERAPY 1/> REGIONS: CPT

## 2020-10-02 PROCEDURE — 97016 VASOPNEUMATIC DEVICE THERAPY: CPT

## 2020-10-02 NOTE — PROGRESS NOTES
PT DAILY TREATMENT NOTE 10-18    Patient Name: Isaias Monsivais  Date:10/2/2020  : 1950  [x]  Patient  Verified  Payor: [place] MED[place]ARE / Plan: [place] MEDICARE PART A & B / Product Type: Medicare /    In time:2:40  Out time:3:20  Total Treatment Time (min): 40  Visit #: 4 of 8    Medicare/BCBS Only   Total Timed Codes (min):  25 1:1 Treatment Time:  25       Treatment Area: Pain in left knee [M25.562]    SUBJECTIVE  Pain Level (0-10 scale): 1  Any medication changes, allergies to medications, adverse drug reactions, diagnosis change, or new procedure performed?: [x] No    [] Yes (see summary sheet for update)  Subjective functional status/changes:   [] No changes reported  \"Sorry I am late. My leg felt better after last session. \"    OBJECTIVE    Modality rationale: decrease edema, decrease inflammation and decrease pain to improve the patients ability to relax and sleep following therapy session to improve restorative sleep patterns.     Min Type Additional Details    [x] Estim:  []Unatt       []IFC  []Premod                        []Other:  []w/ice   []w/heat  Position:   Location:     [] Estim: []Att    []TENS instruct  []NMES                    []Other:  []w/US   []w/ice   []w/heat  Position:  Location:    []  Traction: [] Cervical       []Lumbar                       [] Prone          []Supine                       []Intermittent   []Continuous Lbs:  [] before manual  [] after manual    []  Ultrasound: []Continuous   [] Pulsed                           []1MHz   []3MHz W/cm2:  Location:    []  Iontophoresis with dexamethasone         Location: [] Take home patch   [] In clinic    []  Ice     []  heat  []  Ice massage  []  Laser   []  Anodyne Position:   Location:     []  Laser with stim  []  Other:  Position:  Location:   15 []  Vasopneumatic Device Pressure:       [x] lo [] med [] hi   Temperature: [x] lo [] med [] hi   [x] Skin assessment post-treatment:  [x]intact []redness- no adverse reaction    []redness  adverse reaction:     14 min Therapeutic Exercise:  [x] See flow sheet :   Rationale: increase ROM and increase strength to improve the patients ability to perform gait and transfers with improved LE strength and mobility. 11 min Manual Therapy:  scar massage, emphasis distal aspect of scar; TF mobs for increased extension with prolonged ovepressure; STM and edema massage to left distal thigh and proximal shank   Rationale: decrease pain, increase ROM, increase tissue extensibility and decrease trigger points to improve ease of ADLs after therapy session. With   [] TE   [] TA   [] neuro   [] other: Patient Education: [x] Review HEP    [] Progressed/Changed HEP based on:   [] positioning   [] body mechanics   [] transfers   [] heat/ice application    [] other:      Other Objective/Functional Measures: -Left knee AAROM 4-87 deg     Pain Level (0-10 scale) post treatment: 1    ASSESSMENT/Changes in Function: Patient's session shortened as she arrives 25 min late for her appt time. She cont to demo decreased left knee flexion with very firm end feel. Instructed patient to continue knee flexion stretching over the weekend to continue progressing her AAROM independently. Patient will continue to benefit from skilled PT services to modify and progress therapeutic interventions, address functional mobility deficits, address ROM deficits, address strength deficits, analyze and address soft tissue restrictions, analyze and cue movement patterns, analyze and modify body mechanics/ergonomics, assess and modify postural abnormalities and address imbalance/dizziness to attain remaining goals. []  See Plan of Care  []  See progress note/recertification  []  See Discharge Summary         Progress towards goals / Updated goals:  Short Term Goals: To be accomplished in 1 weeks:  1. Patient will demonstrate compliance with HEP in order to improve left knee mobility for increased ease of ambulation.    goal met. 9/28/20     Long Term Goals: To be accomplished in 4 weeks:  1. Patient will improve FOTO score by 15 points in order to demonstrate a significant improvement in function. 2. Patient will improve left knee AROM 0-100 degrees in order to increase ease of ambulation. Current: progressing, left knee AROM 4-87 deg  3. Patient will improve left knee MMT to 4+/5 in order to increase ease of ADLs. 4. Patient will improve 30 second sit to stand test to 10x in order to increase ease of transfers at home.     PLAN  [x]  Upgrade activities as tolerated     [x]  Continue plan of care  []  Update interventions per flow sheet       []  Discharge due to:_  []  Other:_      Hair Bent 10/2/2020  9:40 AM    Future Appointments   Date Time Provider Dewayne Gamboa   10/2/2020  1:00 PM [facility] RM 4 3D [facility]   10/2/2020  2:15 PM Josh Yadira [facility]   10/5/2020  2:15 PM Pedro Minaya, PTA [facility]   10/6/2020  1:30 PM Nica Dooley MD [facility]   10/7/2020 10:30 AM Pedro Minaya, PTA [facility]   10/9/2020  9:00 AM Troy Ngo, PT [facility]   10/12/2020  9:45 AM Troy Ngo, PT [facility]   10/14/2020  9:45 AM Troy Ngo, PT [facility]   10/16/2020 10:30 AM Pedro Minaya, PTA [facility]   10/19/2020  9:00 AM Pedro Minaya, PTA [facility]   10/21/2020  9:45 AM Pedro Minaya, PTA [facility]   10/23/2020  9:45 AM Pedro Minaya, PTA [facility]

## 2020-10-05 ENCOUNTER — HOSPITAL ENCOUNTER (OUTPATIENT)
Dept: PHYSICAL THERAPY | Age: 70
Discharge: HOME | End: 2020-10-05
Payer: MEDICARE

## 2020-10-05 PROCEDURE — 97140 MANUAL THERAPY 1/> REGIONS: CPT

## 2020-10-05 PROCEDURE — 97110 THERAPEUTIC EXERCISES: CPT

## 2020-10-05 PROCEDURE — 97016 VASOPNEUMATIC DEVICE THERAPY: CPT

## 2020-10-05 NOTE — PROGRESS NOTES
PT DAILY TREATMENT NOTE 10-18    Patient Name: Melissa Vidal  Date:10/5/2020  : 1950  [x]  Patient  Verified  Payor: [place] MEDICARE / Plan: [place] MEDICARE PART A & B / Product Type: Medicare /    In time:215  Out time:310  Total Treatment Time (min): 55  Visit #: 5 of 8    Medicare/BCBS Only   Total Timed Codes (min):  40 1:1 Treatment Time:  40       Treatment Area: Pain in left knee [M25.562]    SUBJECTIVE  Pain Level (0-10 scale): 2/10  Any medication changes, allergies to medications, adverse drug reactions, diagnosis change, or new procedure performed?: [x] No    [] Yes (see summary sheet for update)  Subjective functional status/changes:   [] No changes reported  Pt stated that she is doing pretty good today    OBJECTIVE    Modality rationale: decrease inflammation and decrease pain to improve the patients ability to increase ease with ADLs   Min Type Additional Details    [] Estim:  []Unatt       []IFC  []Premod                        []Other:  []w/ice   []w/heat  Position:  Location:    [] Estim: []Att    []TENS instruct  []NMES                    []Other:  []w/US   []w/ice   []w/heat  Position:  Location:    []  Traction: [] Cervical       []Lumbar                       [] Prone          []Supine                       []Intermittent   []Continuous Lbs:  [] before manual  [] after manual    []  Ultrasound: []Continuous   [] Pulsed                           []1MHz   []3MHz W/cm2:  Location:    []  Iontophoresis with dexamethasone         Location: [] Take home patch   [] In clinic    []  Ice     []  heat  []  Ice massage  []  Laser   []  Anodyne Position:  Location:    []  Laser with stim  []  Other:  Position:  Location:   15 [x]  Vasopneumatic Device Pressure:       [x] lo [] med [] hi   Temperature: [x] lo [] med [] hi   [x] Skin assessment post-treatment:  [x]intact []redness- no adverse reaction    []redness  adverse reaction:     30 min Therapeutic Exercise:  [x] See flow sheet :   Rationale: increase ROM and increase strength to improve the patients ability to increase ease with ADLs    10 min Manual Therapy:  Scar massage and patella mobs   Rationale: decrease pain, increase ROM and increase tissue extensibility to increase ease with ADls    With   [x] TE   [] TA   [] neuro   [] other: Patient Education: [x] Review HEP    [] Progressed/Changed HEP based on:   [] positioning   [] body mechanics   [] transfers   [] heat/ice application    [] other:      Other Objective/Functional Measures:   Had no difficulty with exercises  Scar has mild binding at the distal and proximal ends     Pain Level (0-10 scale) post treatment: 0/10    ASSESSMENT/Changes in Function:   Pt is slowly progressing toward goals. Pt cont with mild pain in the left knee. Cont to have limp with ambulation. Pt does not use AD. Cont with decreased strength and range of motion in the left knee. Patient will continue to benefit from skilled PT services to modify and progress therapeutic interventions, address functional mobility deficits, address ROM deficits, address strength deficits, analyze and address soft tissue restrictions, analyze and cue movement patterns, analyze and modify body mechanics/ergonomics, address imbalance/dizziness and instruct in home and community integration to attain remaining goals. [x]  See Plan of Care  []  See progress note/recertification  []  See Discharge Summary         Progress towards goals / Updated goals:  Short Term Goals: To be accomplished in 1 weeks:  1. Patient will demonstrate compliance with HEP in order to improve left knee mobility for increased ease of ambulation.    goal met. 9/28/20     Long Term Goals: To be accomplished in 4 weeks:  1. Patient will improve FOTO score by 15 points in order to demonstrate a significant improvement in function.    2. Patient will improve left knee AROM 0-100 degrees in order to increase ease of ambulation.   Current: progressing, left knee AROM 4-87 deg  3. Patient will improve left knee MMT to 4+/5 in order to increase ease of ADLs. 4. Patient will improve 30 second sit to stand test to 10x in order to increase ease of transfers at home.     PLAN  []  Upgrade activities as tolerated     [x]  Continue plan of care  []  Update interventions per flow sheet       []  Discharge due to:_  []  Other:_      Jessica Wong, PTA 10/5/2020  6:50 AM    Future Appointments   Date Time Provider Dewayne Gamboa   10/5/2020  2:15 PM Hiram Iris, PTA [facility]   10/6/2020  1:30 PM Alisia Henry MD [facility] AMB   10/7/2020 10:30 AM Hiram Iris, PTA [facility]   10/9/2020  9:00 AM Vishnu Thompson, PT [facility]   10/12/2020  9:45 AM Vishnu Thompson, PT [facility]   10/14/2020  9:45 AM Maria Victoria Zamudio, PT [facility]   10/16/2020 10:30 AM Hiram Iris, PTA [facility]   10/19/2020  9:00 AM Hiram Iris, PTA [facility]   10/21/2020  9:45 AM Hiram Iris, PTA [facility]   10/23/2020  9:45 AM Hiram Iris, PTA [facility]

## 2020-10-06 ENCOUNTER — OFFICE VISIT (OUTPATIENT)
Dept: ORTHOPEDIC SURGERY | Age: 70
End: 2020-10-06
Payer: MEDICARE

## 2020-10-06 VITALS
SYSTOLIC BLOOD PRESSURE: 95 MMHG | BODY MASS INDEX: 23.83 KG/M2 | OXYGEN SATURATION: 96 % | HEART RATE: 96 BPM | HEIGHT: 64 IN | DIASTOLIC BLOOD PRESSURE: 64 MMHG | TEMPERATURE: 98 F | WEIGHT: 139.6 LBS

## 2020-10-06 DIAGNOSIS — S93.492A SPRAIN OF ANTERIOR TALOFIBULAR LIGAMENT OF LEFT ANKLE, INITIAL ENCOUNTER: ICD-10-CM

## 2020-10-06 DIAGNOSIS — M76.812 ANTERIOR TIBIALIS TENDINITIS OF LEFT LOWER EXTREMITY: Primary | ICD-10-CM

## 2020-10-06 DIAGNOSIS — M79.672 LEFT FOOT PAIN: ICD-10-CM

## 2020-10-06 PROCEDURE — G9899 SCRN MAM PERF RSLTS DOC: HCPCS | Performed by: ORTHOPAEDIC SURGERY

## 2020-10-06 PROCEDURE — G8510 SCR DEP NEG, NO PLAN REQD: HCPCS | Performed by: ORTHOPAEDIC SURGERY

## 2020-10-06 PROCEDURE — 1090F PRES/ABSN URINE INCON ASSESS: CPT | Performed by: ORTHOPAEDIC SURGERY

## 2020-10-06 PROCEDURE — G8420 CALC BMI NORM PARAMETERS: HCPCS | Performed by: ORTHOPAEDIC SURGERY

## 2020-10-06 PROCEDURE — 1101F PT FALLS ASSESS-DOCD LE1/YR: CPT | Performed by: ORTHOPAEDIC SURGERY

## 2020-10-06 PROCEDURE — G8536 NO DOC ELDER MAL SCRN: HCPCS | Performed by: ORTHOPAEDIC SURGERY

## 2020-10-06 PROCEDURE — 3017F COLORECTAL CA SCREEN DOC REV: CPT | Performed by: ORTHOPAEDIC SURGERY

## 2020-10-06 PROCEDURE — 73630 X-RAY EXAM OF FOOT: CPT | Performed by: ORTHOPAEDIC SURGERY

## 2020-10-06 PROCEDURE — G8399 PT W/DXA RESULTS DOCUMENT: HCPCS | Performed by: ORTHOPAEDIC SURGERY

## 2020-10-06 PROCEDURE — 99213 OFFICE O/P EST LOW 20 MIN: CPT | Performed by: ORTHOPAEDIC SURGERY

## 2020-10-06 PROCEDURE — G8427 DOCREV CUR MEDS BY ELIG CLIN: HCPCS | Performed by: ORTHOPAEDIC SURGERY

## 2020-10-06 RX ORDER — OMEGA-3S/DHA/EPA/FISH OIL 350-600 MG
CAPSULE ORAL DAILY
Status: ON HOLD | COMMUNITY
End: 2022-08-04

## 2020-10-06 NOTE — PROGRESS NOTES
AMBULATORY PROGRESS NOTE      Patient: See Saleh             MRN: [id number]     SSN: [id number] Body mass index is 23.96 kg/m². YOB: 1950     AGE: 79 y.o. EX: female    PCP: XIOMARA Amaral       IMPRESSION //  DIAGNOSIS AND TREATMENT PLAN      DIAGNOSES  1. Anterior tibialis tendinitis of left lower extremity    2. Sprain of anterior talofibular ligament of left ankle, initial encounter    3. Left foot pain        Orders Placed This Encounter    Generic Supply Order     Left Airsport Aircast ankle brace    [35755] Foot Min 3V     Order Specific Question:   Weight bearing? Answer:   No    omega-3s-dha-epa-fish oil (Fish Oil) 350-600 mg cap     Sig: Take  by mouth daily.  ascorbic acid-multivit-min-MSM 1,000-1,000 mg pwep     Sig: Take  by mouth.  ferrous sulfate (IRON PO)     Sig: Take  by mouth. My impression is that she has some focal tenderness into portion of her left ankle in close proximity the anterior tibial tendon. The tear to tendon, has excellent alignment excellent tension there is no bogginess there is some more tendon tenderness more along the mild, but to the anterior capsule region of the ankle, just lateral to the anterior tibial tendon. There is no instability ankle subtalar //  no instability of the peroneal tendons. PLAN:    1. DME order: left Airsport Aircast ankle brace  2. Follow up with Dr. Johnny Parker November 4th for the routine follow-up of her knee replacement left knee replacement. RTO- 1 month      HPI //  OBJECTIVE EXAMINATION      See Saleh IS A 79 y.o. female who presents to my outpatient office for evaluation of: Left foot pain x 2 weeks. Patient reports knee replacement 6 weeks ago done by Dr. Johnny Parker. She reports increasing her walking and standing as part of her recovery regimen. About two weeks ago, She states that she was walking and felt a pop across her dorsal midfoot.  She states mild redness and swelling across her midfoot at the time. Since this episode of pain 2 weeks ago, she has been having persistent pain across her midfoot. Visit Vitals  BP 95/64 (BP 1 Location: Right arm, BP Patient Position: Sitting)   Pulse 96   Temp 98 °F (36.7 °C) (Temporal)   Ht 5' 4\" (1.626 m)   Wt 139 lb 9.6 oz (63.3 kg)   SpO2 96%   BMI 23.96 kg/m²       ANKLE/FOOT left    Psychiatry: Alert, oriented x 3 (name,place,time of day); speech normal in context and clarity, memory intact grossly, no involuntary movements - tremors, no dementia  Gait: normal  Tenderness: mild across anterior ankle, mild to midfoot   Cutaneous: WNL  Joint Motion: pain with PF across anterior part of ankle. Joint / Tendon Stability: No Ankle or Subtalar instability or joint laxity. No peroneal sublux ability or dislocation  Alignment: neutral Hindfoot, none Metatarsus Adductus Metatarsus. Neuro Motor/Sensory: NL/NL,  Vascular: NL foot/ankle pulses,   Lymphatics: No extremity lymphedema, No calf swelling, no tenderness to calf muscles. CHART REVIEW     Patient Active Problem List   Diagnosis Code    Bilateral SI joint pain M25.50    Low back pain M54.5    Weakness R53.1    Tremor R25.1    Neck pain, chronic M54.2, G89.29    Unsteady gait R26.81    Headache(784.0) R51    Sacroiliac dysfunction M53.3    Lumbar pain M54.5    Bulging lumbar disc M51.26    Facet hypertrophy of lumbar region M47.816    HNP (herniated nucleus pulposus), cervical M50.20    Myofascial pain M79.18    Sacroiliac joint dysfunction of both sides M53.3    Facet arthritis of lumbar region M47.816    Spondylosis of cervical region without myelopathy or radiculopathy M47.812    Primary localized osteoarthritis of left knee M17.12    Localized osteoarthritis of left knee M17.12        Micheal Le has been experiencing pain and discomfort confirmed as outlined in the pain assessment outlined below.       Pain Assessment  10/6/2020   Location of Pain Foot   Pain Location Comment -   Location Modifiers Left   Severity of Pain 3   Quality of Pain (No Data)   Quality of Pain Comment stabbing   Duration of Pain Persistent   Frequency of Pain Constant   Frequency of Pain Comment -   Date Pain First Started 9/22/2020   Aggravating Factors Walking;Standing   Aggravating Factors Comment -   Limiting Behavior No   Relieving Factors Nothing   Relieving Factors Comment -   Result of Injury No   Work-Related Injury -   Type of Injury -        Bradley Vivas  has a past medical history of Anxiety disorder, Arthritis, GERD (gastroesophageal reflux disease), Headache(784.0), Hearing reduced, Left knee pain, Lower back pain (July 2010), Menopause, Osteoarthritis of left knee, Pain of left thumb, Primary localized osteoarthritis of left knee (8/17/2020), Psychiatric disorder, Sacroiliitis ([facility] [address] (9/15/2010), SI (sacroiliac) joint dysfunction, Sinus trouble, and Ulcer (2006). Patients is employed at:         Past Medical History:   Diagnosis Date    Anxiety disorder     Arthritis     Basal joint arthritis, left thumb    GERD (gastroesophageal reflux disease)     Headache(784.0)     Hearing reduced     Left knee pain     Lower back pain July 2010    Menopause     Age 39    Osteoarthritis of left knee     Pain of left thumb     Primary localized osteoarthritis of left knee 8/17/2020    Psychiatric disorder     depression    Sacroiliitis ([facility] [address] 9/15/2010    SI (sacroiliac) joint dysfunction     Sinus trouble     Ulcer 2006    polyp, positive heme occult stools, denies ulcer     Past Surgical History:   Procedure Laterality Date    HX DILATION AND CURETTAGE      HX ORTHOPAEDIC  9/15/2010    Left SI joint injection 30mg Celestone and 4 cc Lidocaine    HX TUBAL LIGATION       Current Outpatient Medications   Medication Sig    omega-3s-dha-epa-fish oil (Fish Oil) 350-600 mg cap Take  by mouth daily.     ascorbic acid-multivit-min-MSM 1,000-1,000 mg pwep Take by mouth.  ferrous sulfate (IRON PO) Take  by mouth.  acetaminophen (TYLENOL) 500 mg tablet Take 1,000 mg by mouth every six (6) hours as needed for Pain. PRN pain    buPROPion SR (WELLBUTRIN SR) 150 mg SR tablet Take 150 mg by mouth daily. Indications: anxiousness associated with depression    rosuvastatin (Crestor) 10 mg tablet Take 10 mg by mouth nightly.  FLUoxetine (PROZAC) 20 mg capsule Take 60 mg by mouth daily. Indications: anxiousness associated with depression    traZODone (DESYREL) 50 mg tablet Take 100 mg by mouth nightly.  cyanocobalamin (VITAMIN B-12) 500 mcg tablet Take 500 mcg by mouth daily.  calcium 600 mg Cap Take  by mouth daily.  OMEPRAZOLE MAGNESIUM (PRILOSEC OTC PO) Take 40 mg by mouth daily.  ALPRAZolam (XANAX) 0.5 mg tablet Take  by mouth nightly as needed. No current facility-administered medications for this visit. Allergies   Allergen Reactions    Topamax [Topiramate] Other (comments)     Whole body tingling  Blurry vision     Social History     Occupational History    Occupation: teacher's asst     Comment: full time   Tobacco Use    Smoking status: Never Smoker    Smokeless tobacco: Never Used    Tobacco comment:  SMOKED    Substance and Sexual Activity    Alcohol use: No     Alcohol/week: 0.0 standard drinks    Drug use: No    Sexual activity: Not Currently     Comment: BTL     Family History   Problem Relation Age of Onset    Heart Failure Mother          at age 80 of congestive heart failure    Heart Disease Mother     Cancer Father          at age 54 of lung cancer; smoker and asbestos exposure       THE  FOR Fariha Sebastian MD 10/6/2020 .       DIAGNOSTIC IMAGING  LAB DATA      Lab Results   Component Value Date/Time    Hemoglobin A1c 5.6 08/10/2020 03:19 PM    //   Lab Results   Component Value Date/Time    Glucose 116 (H) 08/10/2020 03:19 PM        No results found for: [id number], [facility]      No results found for: VITD3, Gwen Stuart, XQVID, VD3RIA, [id number]      REVIEW OF SYSTEMS : 10/6/2020  ALL BELOW ARE Negative except : SEE HPI     CONSTITUTIONAL: No weight loss  PSYCHOLOGICAL : No Feelings of anxiety, depression, agitation  EYES: No blurred vision and no eye discharge. NO eye pain, double vision  ENT: No nasal discharge. No ear pain. CARDIOVASCULAR: No chest pain and no diaphoresis. RESPIRATORY: No cough, no hemoptysis. GI: No vomiting, no diarrhea   : No urinary frequency and no dysuria. MUSCULOSKELETAL: see HPI  SKIN: No rashes. NEURO:  No dizziness,weakness, headaches// No visual changes or confusion, or seizures,   ENDOCRINE: No polyphagia and no polydipsia. HEMATOLOGY: No bleeding tendencies. DIAGNOSTIC IMAGING      FOOT X RAYS 3 VIEWS LEFT  10/6/2020    NON WEIGHT BEARING    X RAYS AT [address] [place] [address]  10/6/2020      Bones: No fractures or dislocations. No focal osteolytic or osteoblastic process     Bone Spurs: No significant bone spurs  Foot Alignment: WNL  Joint Condition: No Significant OA  Soft Tissues: Normal, No radiopaque foreign body and No abnormal calcific densities to soft tissues   No ankle joint effusion in lateral projection. Mineralization: Suggests osteopenia is present, no acute fracture subluxation dislocation.     I have personally reviewed the results of the above study and the interpretation of this study is my professional opinion    Jenniffer Appiah MD  10/6/2020  2:19 PM

## 2020-10-07 ENCOUNTER — HOSPITAL ENCOUNTER (OUTPATIENT)
Dept: PHYSICAL THERAPY | Age: 70
Discharge: HOME | End: 2020-10-07
Payer: MEDICARE

## 2020-10-07 PROCEDURE — 97110 THERAPEUTIC EXERCISES: CPT

## 2020-10-07 PROCEDURE — 97140 MANUAL THERAPY 1/> REGIONS: CPT

## 2020-10-07 PROCEDURE — 97016 VASOPNEUMATIC DEVICE THERAPY: CPT

## 2020-10-07 NOTE — PROGRESS NOTES
PT DAILY TREATMENT NOTE 10-18    Patient Name: Bradley Vivas  Date:10/7/2020  : 1950  [x]  Patient  Verified  Payor: [place] MEDICARE / Plan: [place] MEDICARE PART A & B / Product Type: Medicare /    In time:1030  Out time:1125  Total Treatment Time (min): 55  Visit #: 6 of 8    Medicare/BCBS Only   Total Timed Codes (min):  40 1:1 Treatment Time:  40       Treatment Area: Pain in left knee [M25.562]    SUBJECTIVE  Pain Level (0-10 scale): 1/10  Any medication changes, allergies to medications, adverse drug reactions, diagnosis change, or new procedure performed?: [x] No    [] Yes (see summary sheet for update)  Subjective functional status/changes:   [] No changes reported  Pt stated that she went for a mile walk yesterday    OBJECTIVE    Modality rationale: decrease inflammation and decrease pain to improve the patients ability to increase ease with ADLs   Min Type Additional Details    [] Estim:  []Unatt       []IFC  []Premod                        []Other:  []w/ice   []w/heat  Position:  Location:    [] Estim: []Att    []TENS instruct  []NMES                    []Other:  []w/US   []w/ice   []w/heat  Position:  Location:    []  Traction: [] Cervical       []Lumbar                       [] Prone          []Supine                       []Intermittent   []Continuous Lbs:  [] before manual  [] after manual    []  Ultrasound: []Continuous   [] Pulsed                           []1MHz   []3MHz W/cm2:  Location:    []  Iontophoresis with dexamethasone         Location: [] Take home patch   [] In clinic    []  Ice     []  heat  []  Ice massage  []  Laser   []  Anodyne Position:  Location:    []  Laser with stim  []  Other:  Position:  Location:   15 [x]  Vasopneumatic Device Pressure:       [x] lo [] med [] hi   Temperature: [x] lo [] med [] hi   [x] Skin assessment post-treatment:  [x]intact []redness- no adverse reaction    []redness  adverse reaction:     30 min Therapeutic Exercise:  [x] See flow sheet : Rationale: increase ROM and increase strength to improve the patients ability to increase ease with ADLs    10 min Manual Therapy:  Patella mobs, DTM to quads and gastroc   Rationale: decrease pain, increase ROM and increase tissue extensibility to increase ease with walking    With   [x] TE   [] TA   [] neuro   [] other: Patient Education: [x] Review HEP    [] Progressed/Changed HEP based on:   [] positioning   [] body mechanics   [] transfers   [] heat/ice application    [] other:      Other Objective/Functional Measures:   Pt was able to make full revolution on bike after 2 min of rocking  Had no difficulty with exercises  Heel slides cont to be difficult     Pain Level (0-10 scale) post treatment: 0.5/10    ASSESSMENT/Changes in Function:   Pt is slowly progressing toward goals. Strength and range of motion are slowly improving. Pt cont to have some difficulty with sit to stand transfers. Patient will continue to benefit from skilled PT services to modify and progress therapeutic interventions, address functional mobility deficits, address ROM deficits, address strength deficits, analyze and address soft tissue restrictions, analyze and cue movement patterns, analyze and modify body mechanics/ergonomics, address imbalance/dizziness and instruct in home and community integration to attain remaining goals. [x]  See Plan of Care  []  See progress note/recertification  []  See Discharge Summary         Progress towards goals / Updated goals:  Short Term Goals: To be accomplished in 1 weeks:  1. Patient will demonstrate compliance with HEP in order to improve left knee mobility for increased ease of ambulation.    goal met. 9/28/20     Long Term Goals: To be accomplished in 4 weeks:  1. Patient will improve FOTO score by 15 points in order to demonstrate a significant improvement in function.    2. Patient will improve left knee AROM 0-100 degrees in order to increase ease of ambulation.   Current: progressing, left knee AROM 4-87 deg  3. Patient will improve left knee MMT to 4+/5 in order to increase ease of ADLs. 4. Patient will improve 30 second sit to stand test to 10x in order to increase ease of transfers at home.     PLAN  []  Upgrade activities as tolerated     [x]  Continue plan of care  []  Update interventions per flow sheet       []  Discharge due to:_  []  Other:_      Benigno Sandhu, PTA 10/7/2020  8:22 AM    Future Appointments   Date Time Provider Dewayne Gamboa   10/7/2020 10:30 AM Zuri Hals, PTA [facility]   10/9/2020  9:00 AM Gordon Kras, PT [facility]   10/12/2020  9:45 AM Gordon Kras, PT [facility]   10/14/2020  9:45 AM Genefelicia Overton, PT [facility]   10/16/2020 10:30 AM Zuri Hals, PTA [facility]   10/19/2020  9:00 AM Zuri Hals, PTA [facility]   10/21/2020  9:45 AM Zuri Hals, PTA [facility]   10/23/2020  9:45 AM Zuri Hals, PTA [facility]   11/9/2020 11:15 AM Soila Schulz MD [facility] BS AMB

## 2020-10-09 ENCOUNTER — HOSPITAL ENCOUNTER (OUTPATIENT)
Dept: PHYSICAL THERAPY | Age: 70
Discharge: HOME | End: 2020-10-09
Payer: MEDICARE

## 2020-10-09 PROCEDURE — 97110 THERAPEUTIC EXERCISES: CPT

## 2020-10-09 PROCEDURE — 97016 VASOPNEUMATIC DEVICE THERAPY: CPT

## 2020-10-09 PROCEDURE — 97140 MANUAL THERAPY 1/> REGIONS: CPT

## 2020-10-09 NOTE — PROGRESS NOTES
PT DAILY TREATMENT NOTE 10-18    Patient Name: Jason Edge  Date:10/9/2020  : 1950  [x]  Patient  Verified  Payor: [place] MEDICARE / Plan: VA MEDICARE PART A & B / Product Type: Medicare /    In time: 9:02  Out time: 10:00  Total Treatment Time (min): 62  Visit #: 7 of 8    Medicare/BCBS Only   Total Timed Codes (min):  43 1:1 Treatment Time:  43       Treatment Area: Pain in left knee [M25.562]    SUBJECTIVE  Pain Level (0-10 scale):  0/10  Any medication changes, allergies to medications, adverse drug reactions, diagnosis change, or new procedure performed?: [x] No    [] Yes (see summary sheet for update)  Subjective functional status/changes:   [] No changes reported  Pt. Reports she is doing pretty good.  She is doing well with her walking program.     OBJECTIVE    Modality rationale: decrease edema, decrease inflammation and decrease pain to improve the patients ability to increase ease of ambulation    Min Type Additional Details    [] Estim:  []Unatt       []IFC  []Premod                        []Other:  []w/ice   []w/heat  Position:  Location:    [] Estim: []Att    []TENS instruct  []NMES                    []Other:  []w/US   []w/ice   []w/heat  Position:  Location:    []  Traction: [] Cervical       []Lumbar                       [] Prone          []Supine                       []Intermittent   []Continuous Lbs:  [] before manual  [] after manual    []  Ultrasound: []Continuous   [] Pulsed                           []1MHz   []3MHz W/cm2:  Location:    []  Iontophoresis with dexamethasone         Location: [] Take home patch   [] In clinic    []  Ice     []  heat  []  Ice massage  []  Laser   []  Anodyne Position:  Location:    []  Laser with stim  []  Other:  Position:  Location:   15 [x]  Vasopneumatic Device Pressure:       [x] lo [] med [] hi   Temperature: [x] lo [] med [] hi   [x] Skin assessment post-treatment:  [x]intact []redness- no adverse reaction    []redness  adverse reaction:     28 min Therapeutic Exercise:  [x] See flow sheet :   Rationale: increase ROM and increase strength to improve the patients ability to increase ease of ADLs    15 min Manual Therapy:  Patella mobs, trigger point release and STM to distal quads, ant/post tib mobs   Rationale: decrease pain, increase ROM and increase tissue extensibility to increase ease of ambulation           With   [x] TE   [] TA   [] neuro   [] other: Patient Education: [x] Review HEP    [] Progressed/Changed HEP based on:   [] positioning   [] body mechanics   [] transfers   [] heat/ice application    [] other:      Other Objective/Functional Measures:   Left knee AROM: 5-81 degrees   Flexion AROM improved slightly to 83 degrees following manual  She tolerated leg press well today    Pain Level (0-10 scale) post treatment: 0.5/10    ASSESSMENT/Changes in Function:  Pt. Is progressing with physical therapy. She is ambulating without an AD and is walking up to a mile. She continues to have significant decrease in left knee AROM and decreased quad strength. Patient will continue to benefit from skilled PT services to modify and progress therapeutic interventions, address functional mobility deficits, address ROM deficits, address strength deficits, analyze and address soft tissue restrictions, analyze and cue movement patterns, analyze and modify body mechanics/ergonomics and assess and modify postural abnormalities to attain remaining goals. Progress towards goals / Updated goals:  Short Term Goals: To be accomplished in 1 weeks:  1. Patient will demonstrate compliance with HEP in order to improve left knee mobility for increased ease of ambulation.    goal met. 9/28/20     Long Term Goals: To be accomplished in 4 weeks:  1. Patient will improve FOTO score by 15 points in order to demonstrate a significant improvement in function.    2. Patient will improve left knee AROM 0-100 degrees in order to increase ease of ambulation.   Not met: 5-81 degrees (10/9/20)  3. Patient will improve left knee MMT to 4+/5 in order to increase ease of ADLs. 4. Patient will improve 30 second sit to stand test to 10x in order to increase ease of transfers at home.     PLAN  []  Upgrade activities as tolerated     [x]  Continue plan of care  []  Update interventions per flow sheet       []  Discharge due to:_  []  Other:_      Melba Arceo, PT 10/9/2020  7:47 AM    Future Appointments   Date Time Provider Dewayne Gamboa   10/9/2020  9:00 AM Chioma Gary, PT [facility]   10/12/2020  9:45 AM Chioma Gary, PT [facility]   10/14/2020  9:45 AM Brenton Marion, PT [facility]   10/16/2020 10:30 AM Tamara Junior, PTA [facility]   10/19/2020  9:00 AM Tamara Junior, PTA TPNRUUZ [facility]   10/21/2020  9:45 AM Tamara Junior, PTA [facility]   10/23/2020  9:45 AM Tamara Junior, PTA [facility]   11/9/2020 11:15 AM Yobany Schulz MD [facility] BS AMB

## 2020-10-12 ENCOUNTER — HOSPITAL ENCOUNTER (OUTPATIENT)
Dept: PHYSICAL THERAPY | Age: 70
Discharge: HOME | End: 2020-10-12
Payer: MEDICARE

## 2020-10-12 PROCEDURE — 97016 VASOPNEUMATIC DEVICE THERAPY: CPT

## 2020-10-12 PROCEDURE — 97110 THERAPEUTIC EXERCISES: CPT

## 2020-10-12 NOTE — PROGRESS NOTES
[facility]  [facility] [employer]  [place]  [address] [place] [address]  [phone] fax    Continued Plan of Care/ Re-certification for Physical Therapy Services    Patient name: Melissa Vidal Start of Care: 2020   Referral source: Bhupendra Patel, [address] : 1950               Medical Diagnosis: Pain in left knee [M25.562]  Payor: VA MEDICARE / Plan: VA MEDICARE PART A & B / Product Type: Medicare /  Onset Date: 20               Treatment Diagnosis:  Left knee pain   Prior Hospitalization: see medical history Provider#: [id number]   Medications: Verified on Patient summary List    Comorbidities: arthritis, depression   Prior Level of Function: Ind with ambulation, Ind with ADLs, lives alone    Visits from Start of Care: 8    Missed Visits: 0    The Plan of Care and following information is based on the patient's current status:  Goal: Patient will improve FOTO score by 15 points in order to demonstrate a significant improvement in function. Status at last note/certification: 48  Current Status: not met    Goal: Patient will improve left knee AROM 0-100 degrees in order to increase ease of ambulation. Status at last note/certification: 5-35 degrees  Current Status: not met    Goal: Patient will improve left knee MMT to 4+/5 in order to increase ease of ADLs. Status at last note/certification: ext: 4/5 flex: 4/5  Current Status: not met    Goal: Patient will improve 30 second sit to stand test to 10x in order to increase ease of transfers at home.    Status at last note/certification: 9x  Current Status: not met    Key functional changes:  Improving knee mobility       Problems/ barriers to goal attainment:  none     Problem List: pain affecting function, decrease ROM, decrease strength, edema affecting function, impaired gait/ balance, decrease ADL/ functional abilitiies, decrease activity tolerance, decrease flexibility/ joint mobility and decrease transfer abilities    Treatment Plan: Therapeutic exercise, Therapeutic activities, Neuromuscular re-education, Physical agent/modality, Gait/balance training, Manual therapy, Patient education, Self Care training, Functional mobility training and Home safety training     Patient Goal (s) has been updated and includes: to walk better     Goals for this certification period to be accomplished in 4 weeks:  1. Patient will improve FOTO score by 15 points in order to demonstrate a significant improvement in function. 2. Patient will improve left knee AROM 0-100 degrees in order to increase ease of ambulation. 3. Patient will improve left knee MMT to 5/5 in order to increase ease of stair negotiation  4. Patient will improve 30 second sit to stand test to 10x in order to increase ease of transfers at home. Frequency / Duration: Patient to be seen 2-3 times per week for 4 weeks:    Assessment / Recommendations: pt. Is progressing with physical therapy. She continues to be limited by decreased left knee AROM at 5-81 degrees. Left knee MMT is slowly improving ext: 4+/5 flex: 4/5. 30 second sit to stand test remains at 9x with excessive weight shift to right side. FOTO score improved to 62 points. Skilled PT is medically necessary in order to continue to improve left knee mobility and strength for increased ease of ADLs and improved quality of life. Certification Period:  10/12/20-11/10/20    Osmin Manuel PT 10/12/2020 9:01 AM    ________________________________________________________________________  I certify that the above Therapy Services are being furnished while the patient is under my care. I agree with the treatment plan and certify that this therapy is necessary. [] I have read the above and request that my patient continue as recommended.   [] I have read the above report and request that my patient continue therapy with the following changes/special instructions: _______________________________________  [] I have read the above report and request that my patient be discharged from therapy    Physician's Signature:____________Date:_________TIME:________    ** Signature, Date and Time must be completed for valid certification **    Please sign and return to [facility] Ileana Loera  [address] [place] [address]  [phone] fax

## 2020-10-12 NOTE — PROGRESS NOTES
PT DAILY TREATMENT NOTE 10-18    Patient Name: César Cote  Date:10/12/2020  : 1950  [x]  Patient  Verified  Payor: VA MEDICARE / Plan: VA MEDICARE PART A & B / Product Type: Medicare /    In time: 9:44  Out time: 10:40  Total Treatment Time (min): 56  Visit #: 8 of 8    Medicare/BCBS Only   Total Timed Codes (min):  41 1:1 Treatment Time:  41       Treatment Area: Pain in left knee [M25.562]    SUBJECTIVE  Pain Level (0-10 scale):  0/10  Any medication changes, allergies to medications, adverse drug reactions, diagnosis change, or new procedure performed?: [x] No    [] Yes (see summary sheet for update)  Subjective functional status/changes:   [] No changes reported  Pt. Reports she is doing pretty good today.  She reports she has been working on her HEP    OBJECTIVE    Modality rationale: decrease edema, decrease inflammation and decrease pain to improve the patients ability to increase ease of ADLs   Min Type Additional Details    [] Estim:  []Unatt       []IFC  []Premod                        []Other:  []w/ice   []w/heat  Position:  Location:    [] Estim: []Att    []TENS instruct  []NMES                    []Other:  []w/US   []w/ice   []w/heat  Position:  Location:    []  Traction: [] Cervical       []Lumbar                       [] Prone          []Supine                       []Intermittent   []Continuous Lbs:  [] before manual  [] after manual    []  Ultrasound: []Continuous   [] Pulsed                           []1MHz   []3MHz W/cm2:  Location:    []  Iontophoresis with dexamethasone         Location: [] Take home patch   [] In clinic    []  Ice     []  heat  []  Ice massage  []  Laser   []  Anodyne Position:  Location:    []  Laser with stim  []  Other:  Position:  Location:   15 [x]  Vasopneumatic Device Pressure:       [x] lo [] med [] hi   Temperature: [x] lo [] med [] hi   [x] Skin assessment post-treatment:  [x]intact []redness- no adverse reaction    []redness  adverse reaction:     41 min Therapeutic Exercise:  [x] See flow sheet :   Rationale: increase ROM and increase strength to improve the patients ability to increase ease of ADLs          With   [x] TE   [] TA   [] neuro   [] other: Patient Education: [x] Review HEP    [] Progressed/Changed HEP based on:   [] positioning   [] body mechanics   [] transfers   [] heat/ice application    [] other:      Other Objective/Functional Measures:    FOTO: 62  Left knee MMT: ext: 4+/5 flex: 4/5  30 second sit to stand test: 9x  Pt.was challenged with avoiding genu valgus during leg press      Pain Level (0-10 scale) post treatment: 0/10    ASSESSMENT/Changes in Function:      []  See Plan of Care  [x]  See progress note/recertification  []  See Discharge Summary         Progress towards goals / Updated goals:  See progress note    PLAN  []  Upgrade activities as tolerated     [x]  Continue plan of care  []  Update interventions per flow sheet       []  Discharge due to:_  []  Other:_      Dee Graham, PT 10/12/2020  8:02 AM    Future Appointments   Date Time Provider Dewayne Gamboa   10/12/2020  9:45 AM Juanjose Holguin, PT [facility]   10/14/2020  9:45 AM Brenda Simpson, PT FCSAG[facility]   10/16/2020 10:30 AM Alroy Peek, PTA NYPBHIS [facility]   10/19/2020  9:00 AM Alroy Peek, PTA UZYFHEY [facility]   10/21/2020  9:45 AM Alroy Peek, PTA [facility]   10/23/2020  9:45 AM Alroy Peek, PTA [facility]   11/9/2020 11:15 AM Roshan Schulz MD [facility] AMB

## 2020-10-14 ENCOUNTER — HOSPITAL ENCOUNTER (OUTPATIENT)
Dept: PHYSICAL THERAPY | Age: 70
Discharge: HOME | End: 2020-10-14
Payer: MEDICARE

## 2020-10-14 PROCEDURE — 97110 THERAPEUTIC EXERCISES: CPT

## 2020-10-14 PROCEDURE — 97140 MANUAL THERAPY 1/> REGIONS: CPT

## 2020-10-14 PROCEDURE — 97016 VASOPNEUMATIC DEVICE THERAPY: CPT

## 2020-10-14 NOTE — PROGRESS NOTES
PT DAILY TREATMENT NOTE 10-18    Patient Name: Joanna Obregon  Date:10/14/2020  : 1950  [x]  Patient  Verified  Payor: VA MEDICARE / Plan: VA MEDICARE PART A & B / Product Type: Medicare /    In time: 9:45   Out time: 10:45  Total Treatment Time (min): 60  Visit #: 1 of     Medicare/BCBS Only   Total Timed Codes (min):  45 1:1 Treatment Time:  40       Treatment Area: Pain in left knee [M25.562]    SUBJECTIVE  Pain Level (0-10 scale):  0.5  Any medication changes, allergies to medications, adverse drug reactions, diagnosis change, or new procedure performed?: [x] No    [] Yes (see summary sheet for update)  Subjective functional status/changes:   [] No changes reported  Pt reports she sees the MD on 2020.     OBJECTIVE    Modality rationale: decrease edema, decrease inflammation and decrease pain to improve the patients ability to perform ADLs   Min Type Additional Details    [] Estim:  []Unatt       []IFC  []Premod                        []Other:  []w/ice   []w/heat  Position:  Location:    [] Estim: []Att    []TENS instruct  []NMES                    []Other:  []w/US   []w/ice   []w/heat  Position:  Location:    []  Traction: [] Cervical       []Lumbar                       [] Prone          []Supine                       []Intermittent   []Continuous Lbs:  [] before manual  [] after manual    []  Ultrasound: []Continuous   [] Pulsed                           []1MHz   []3MHz W/cm2:  Location:    []  Iontophoresis with dexamethasone         Location: [] Take home patch   [] In clinic    []  Ice     []  heat  []  Ice massage  []  Laser   []  Anodyne Position:  Location:    []  Laser with stim  []  Other:  Position:  Location:   15 [x]  Vasopneumatic Device Pressure:       [x] lo [] med [] hi   Temperature: [x] lo [] med [] hi   [x] Skin assessment post-treatment:  [x]intact []redness- no adverse reaction    []redness  adverse reaction:     30 min Therapeutic Exercise:  [x] See flow sheet : Rationale: increase ROM and increase strength to improve the patients ability to increase ease of ADLs    15 min Manual Therapy: in supine: left patellar superior grade 2-3 mobs at end range knee EXT, left tibial anterior grade 2-3 mobs with slight ER with posterior femur mobs at end range knee EXT, DTM left popliteus/HS/distal quad, left patellar inferior grade 2-3 mobs at end range knee flex, left posterior tibial grade 2-3 mobs at end range knee flex, scar massage, PROM left knee flex/EXT after performing above; contract/relax to left quad to improve left knee flex ROM in sitting   Rationale: decrease pain, increase ROM and increase tissue extensibility to improve gait quality. With   [x] TE   [] TA   [] neuro   [] other: Patient Education: [x] Review HEP    [] Progressed/Changed HEP based on:   [] positioning   [] body mechanics   [] transfers   [] heat/ice application    [] other:      Other Objective/Functional Measures: Tightness noted in the left popliteus/HS/distal quad with manual interventions. Progressed/Added exercises per flow sheet to improve ROM in the left knee. Mild increase in left hip flex noted with 8 inch step ups. Pain Level (0-10 scale) post treatment: 1    ASSESSMENT/Changes in Function: Reported minimal increase in pain post session today. Pt does demonstrate improvements in overall left knee ROM in that she is able to perform a full revolution on the stationary bike. Pt continues to lack full range left knee AROM/PROM as noted with manual interventions and with exercises. Continue POC as tolerated.       Patient will continue to benefit from skilled PT services to modify and progress therapeutic interventions, address functional mobility deficits, address ROM deficits, address strength deficits, analyze and address soft tissue restrictions, analyze and cue movement patterns, analyze and modify body mechanics/ergonomics and assess and modify postural abnormalities to attain remaining goals. []  See Plan of Care  []  See progress note/recertification  []  See Discharge Summary         Progress towards goals / Updated goals:  Goals for this certification period to be accomplished in 4 weeks:  1. Patient will improve FOTO score by 15 points in order to demonstrate a significant improvement in function. 2. Patient will improve left knee AROM 0-100 degrees in order to increase ease of ambulation. 3. Patient will improve left knee MMT to 5/5 in order to increase ease of stair negotiation  4. Patient will improve 30 second sit to stand test to 10x in order to increase ease of transfers at home.     PLAN  [x]  Upgrade activities as tolerated     [x]  Continue plan of care  [x]  Update interventions per flow sheet       []  Discharge due to:_  []  Other:_      Jia Sanz, PT 10/14/2020  10:11 AM    Future Appointments   Date Time Provider Dewayne Gamboa   10/16/2020 10:30 AM Blaire Leigh PTA [facility]   10/19/2020  9:00 AM Blaire Leigh PTA CEM[facility]SE[facility]   10/21/2020  9:45 AM Blaire Leigh, PTA [facility]   10/23/2020  9:45 AM Blaire Leigh, PTA [facility]   11/9/2020 11:15 AM Nimesh Schulz MD [facility] BS AMB

## 2020-10-16 ENCOUNTER — HOSPITAL ENCOUNTER (OUTPATIENT)
Dept: PHYSICAL THERAPY | Age: 70
Discharge: HOME | End: 2020-10-16
Payer: MEDICARE

## 2020-10-16 PROCEDURE — 97016 VASOPNEUMATIC DEVICE THERAPY: CPT

## 2020-10-16 PROCEDURE — 97110 THERAPEUTIC EXERCISES: CPT

## 2020-10-16 PROCEDURE — 97140 MANUAL THERAPY 1/> REGIONS: CPT

## 2020-10-16 NOTE — PROGRESS NOTES
PT DAILY TREATMENT NOTE 10-18    Patient Name: Joanna Obregon  Date:10/16/2020  : 1950  [x]  Patient  Verified  Payor: [place] MEDICARE / Plan: [place] MEDICARE PART A & B / Product Type: Medicare /    In time:1030  Out time:1125  Total Treatment Time (min): 55  Visit #: 2 of 12    Medicare/BCBS Only   Total Timed Codes (min):  40 1:1 Treatment Time:  40       Treatment Area: Pain in left knee [M25.562]    SUBJECTIVE  Pain Level (0-10 scale): 0/10  Any medication changes, allergies to medications, adverse drug reactions, diagnosis change, or new procedure performed?: [x] No    [] Yes (see summary sheet for update)  Subjective functional status/changes:   [] No changes reported  Pt stated that she is doing well today    OBJECTIVE    Modality rationale: decrease inflammation to improve the patients ability to increase ease with ADLs   Min Type Additional Details    [] Estim:  []Unatt       []IFC  []Premod                        []Other:  []w/ice   []w/heat  Position:  Location:    [] Estim: []Att    []TENS instruct  []NMES                    []Other:  []w/US   []w/ice   []w/heat  Position:  Location:    []  Traction: [] Cervical       []Lumbar                       [] Prone          []Supine                       []Intermittent   []Continuous Lbs:  [] before manual  [] after manual    []  Ultrasound: []Continuous   [] Pulsed                           []1MHz   []3MHz W/cm2:  Location:    []  Iontophoresis with dexamethasone         Location: [] Take home patch   [] In clinic    []  Ice     []  heat  []  Ice massage  []  Laser   []  Anodyne Position:  Location:    []  Laser with stim  []  Other:  Position:  Location:   15 [x]  Vasopneumatic Device Pressure:       [x] lo [] med [] hi   Temperature: [x] lo [] med [] hi   [x] Skin assessment post-treatment:  [x]intact []redness- no adverse reaction    []redness  adverse reaction:     30 min Therapeutic Exercise:  [x] See flow sheet :   Rationale: increase ROM and increase strength to improve the patients ability to increase ease with ADls    10 min Manual Therapy:  Patella and AP tib mobs   Rationale: decrease pain, increase ROM and increase tissue extensibility to increase ease with walking    With   [x] TE   [] TA   [] neuro   [] other: Patient Education: [x] Review HEP    [] Progressed/Changed HEP based on:   [] positioning   [] body mechanics   [] transfers   [] heat/ice application    [] other:      Other Objective/Functional Measures:   Had no difficulty with exercises  8\" step ups are challenging  Good form with TRX squats     Pain Level (0-10 scale) post treatment: 1/10    ASSESSMENT/Changes in Function:   Pt is slowly progressing toward goals. Pt cont with decreased AROM in the left knee. Strength in knee is increasing. Patient will continue to benefit from skilled PT services to modify and progress therapeutic interventions, address functional mobility deficits, address ROM deficits, address strength deficits, analyze and address soft tissue restrictions, analyze and cue movement patterns, analyze and modify body mechanics/ergonomics, address imbalance/dizziness and instruct in home and community integration to attain remaining goals. []  See Plan of Care  [x]  See progress note/recertification  []  See Discharge Summary         Progress towards goals / Updated goals:  1. Patient will improve FOTO score by 15 points in order to demonstrate a significant improvement in function. 2. Patient will improve left knee AROM 0-100 degrees in order to increase ease of ambulation. 3. Patient will improve left knee MMT to 5/5 in order to increase ease of stair negotiation  4. Patient will improve 30 second sit to stand test to 10x in order to increase ease of transfers at home.     PLAN  []  Upgrade activities as tolerated     [x]  Continue plan of care  []  Update interventions per flow sheet       []  Discharge due to:_  []  Other:_      Cloria Fly, NIMO 10/16/2020  7:00 AM    Future Appointments   Date Time Provider Dewayne Reyesi   10/16/2020 10:30 AM Gilby Terri, NIMO [facility]   10/19/2020  9:00 AM Gilby NIMO Murray [facility]   10/21/2020  9:45 AM Gilby Terri, PTA [facility]   10/23/2020  9:45 AM Gilby Terri PTA [facility]   11/9/2020 11:15 AM Yessy Schulz MD [facility] BS AMB

## 2020-10-19 ENCOUNTER — HOSPITAL ENCOUNTER (OUTPATIENT)
Dept: PHYSICAL THERAPY | Age: 70
Discharge: HOME | End: 2020-10-19
Payer: MEDICARE

## 2020-10-19 PROCEDURE — 97110 THERAPEUTIC EXERCISES: CPT

## 2020-10-19 PROCEDURE — 97140 MANUAL THERAPY 1/> REGIONS: CPT

## 2020-10-19 PROCEDURE — 97016 VASOPNEUMATIC DEVICE THERAPY: CPT

## 2020-10-19 NOTE — PROGRESS NOTES
PT DAILY TREATMENT NOTE 10-18    Patient Name: Meaghan Lanza  Date:10/19/2020  : 1950  [x]  Patient  Verified  Payor: [place] MEDICARE / Plan: VA MEDICARE PART A & B / Product Type: Medicare /    In time:900  Out time:955  Total Treatment Time (min): 55  Visit #: 3 of 12    Medicare/BCBS Only   Total Timed Codes (min):  40 1:1 Treatment Time:  40       Treatment Area: Pain in left knee [M25.562]    SUBJECTIVE  Pain Level (0-10 scale): 1/10  Any medication changes, allergies to medications, adverse drug reactions, diagnosis change, or new procedure performed?: [x] No    [] Yes (see summary sheet for update)  Subjective functional status/changes:   [] No changes reported  Pt stated that she does not have pain really, just soreness.  Stated that she has been up on her feet all day for a few days    OBJECTIVE    Modality rationale: decrease inflammation and decrease pain to improve the patients ability to increase ease with ADLs   Min Type Additional Details    [] Estim:  []Unatt       []IFC  []Premod                        []Other:  []w/ice   []w/heat  Position:  Location:    [] Estim: []Att    []TENS instruct  []NMES                    []Other:  []w/US   []w/ice   []w/heat  Position:  Location:    []  Traction: [] Cervical       []Lumbar                       [] Prone          []Supine                       []Intermittent   []Continuous Lbs:  [] before manual  [] after manual    []  Ultrasound: []Continuous   [] Pulsed                           []1MHz   []3MHz W/cm2:  Location:    []  Iontophoresis with dexamethasone         Location: [] Take home patch   [] In clinic    []  Ice     []  heat  []  Ice massage  []  Laser   []  Anodyne Position:  Location:    []  Laser with stim  []  Other:  Position:  Location:   15 [x]  Vasopneumatic Device Pressure:       [x] lo [] med [] hi   Temperature: [x] lo [] med [] hi   [x] Skin assessment post-treatment:  [x]intact []redness- no adverse reaction    []redness  adverse reaction:     32 min Therapeutic Exercise:  [x] See flow sheet :   Rationale: increase ROM and increase strength to improve the patients ability to increase ease with ADLs    8 min Manual Therapy:  Patella and AP tib mobs   Rationale: decrease pain, increase ROM and increase tissue extensibility to increase ease with ADLs          With   [x] TE   [] TA   [] neuro   [] other: Patient Education: [x] Review HEP    [] Progressed/Changed HEP based on:   [] positioning   [] body mechanics   [] transfers   [] heat/ice application    [] other:      Other Objective/Functional Measures:   Had no difficulty with exercises  Had 95* flex in the left knee  Cont to have difficulty with stairs     Pain Level (0-10 scale) post treatment: 1/10    ASSESSMENT/Changes in Function:   Pt is slowly progressing toward goals. Strength and AROM in the left knee cont to improve slowly. Gait is slowly improving with decreased limp. Pt cont to be challenged with stairs. Patient will continue to benefit from skilled PT services to modify and progress therapeutic interventions, address functional mobility deficits, address ROM deficits, address strength deficits, analyze and address soft tissue restrictions, analyze and cue movement patterns, analyze and modify body mechanics/ergonomics, address imbalance/dizziness and instruct in home and community integration to attain remaining goals. []  See Plan of Care  [x]  See progress note/recertification  []  See Discharge Summary         Progress towards goals / Updated goals:  1. Patient will improve FOTO score by 15 points in order to demonstrate a significant improvement in function. 2. Patient will improve left knee AROM 0-100 degrees in order to increase ease of ambulation. 3. Patient will improve left knee MMT to 5/5 in order to increase ease of stair negotiation  4. Patient will improve 30 second sit to stand test to 10x in order to increase ease of transfers at home.     PLAN  [] Upgrade activities as tolerated     [x]  Continue plan of care  []  Update interventions per flow sheet       []  Discharge due to:_  []  Other:_      Jim Lyons PTA 10/19/2020  6:45 AM    Future Appointments   Date Time Provider Dewayne Gamboa   10/19/2020  9:00 AM Basil Mello PTA [facility]   10/21/2020  9:45 AM Basil Mello PTA [facility]   10/23/2020  9:45 AM Basil Mello PTA [facility]   11/9/2020 11:15 AM Macey Schulz MD [facility] BS AMB

## 2020-10-21 ENCOUNTER — HOSPITAL ENCOUNTER (OUTPATIENT)
Dept: PHYSICAL THERAPY | Age: 70
Discharge: HOME | End: 2020-10-21
Payer: MEDICARE

## 2020-10-21 PROCEDURE — 97016 VASOPNEUMATIC DEVICE THERAPY: CPT

## 2020-10-21 PROCEDURE — 97110 THERAPEUTIC EXERCISES: CPT

## 2020-10-21 NOTE — PROGRESS NOTES
PT DAILY TREATMENT NOTE 10-18    Patient Name: Bailee Nuñez  Date:10/21/2020  : 1950  [x]  Patient  Verified  Payor: VA MEDICARE / Plan: VA MEDICARE PART A & B / Product Type: Medicare /    In time:945  Out time:1040  Total Treatment Time (min): 54  Visit #: 4 of 12    Medicare/BCBS Only   Total Timed Codes (min):  40 1:1 Treatment Time:  40       Treatment Area: Pain in left knee [M25.562]    SUBJECTIVE  Pain Level (0-10 scale): 0/10  Any medication changes, allergies to medications, adverse drug reactions, diagnosis change, or new procedure performed?: [x] No    [] Yes (see summary sheet for update)  Subjective functional status/changes:   [] No changes reported  Pt stated that she just has soreness in the knee today    OBJECTIVE    Modality rationale: decrease inflammation and decrease pain to improve the patients ability to increase ease with ADls   Min Type Additional Details    [] Estim:  []Unatt       []IFC  []Premod                        []Other:  []w/ice   []w/heat  Position:  Location:    [] Estim: []Att    []TENS instruct  []NMES                    []Other:  []w/US   []w/ice   []w/heat  Position:  Location:    []  Traction: [] Cervical       []Lumbar                       [] Prone          []Supine                       []Intermittent   []Continuous Lbs:  [] before manual  [] after manual    []  Ultrasound: []Continuous   [] Pulsed                           []1MHz   []3MHz W/cm2:  Location:    []  Iontophoresis with dexamethasone         Location: [] Take home patch   [] In clinic    []  Ice     []  heat  []  Ice massage  []  Laser   []  Anodyne Position:  Location:    []  Laser with stim  []  Other:  Position:  Location:   15 [x]  Vasopneumatic Device Pressure:       [x] lo [] med [] hi   Temperature: [x] lo [] med [] hi   [x] Skin assessment post-treatment:  [x]intact []redness- no adverse reaction    []redness  adverse reaction:     40 min Therapeutic Exercise:  [x] See flow sheet : Rationale: increase ROM and increase strength to improve the patients ability to increase ease with ADls    With   [x] TE   [] TA   [] neuro   [] other: Patient Education: [x] Review HEP    [] Progressed/Changed HEP based on:   [] positioning   [] body mechanics   [] transfers   [] heat/ice application    [] other:      Other Objective/Functional Measures:   Had 96* flex with heel slides today  No complaint of increased pain during session   Is pleased with bike  Cont to have difficulty with TB HSC     Pain Level (0-10 scale) post treatment: 1/10    ASSESSMENT/Changes in Function:   Pt is slowly progressing toward goals. Strength and range of motion in the left knee cont to improve. Pt cont with slight limp on the left due to lack of range of motion. Strength in the left knee is improving. Patient will continue to benefit from skilled PT services to modify and progress therapeutic interventions, address functional mobility deficits, address ROM deficits, address strength deficits, analyze and address soft tissue restrictions, analyze and cue movement patterns, analyze and modify body mechanics/ergonomics, address imbalance/dizziness and instruct in home and community integration to attain remaining goals. []  See Plan of Care  [x]  See progress note/recertification  []  See Discharge Summary         Progress towards goals / Updated goals:  1. Patient will improve FOTO score by 15 points in order to demonstrate a significant improvement in function. 2. Patient will improve left knee AROM 0-100 degrees in order to increase ease of ambulation. Progressing. AROM in the left knee is -4-96*. 10/21/20  3. Patient will improve left knee MMT to 5/5 in order to increase ease of stair negotiation  4. Patient will improve 30 second sit to stand test to 10x in order to increase ease of transfers at home.     PLAN  []  Upgrade activities as tolerated     [x]  Continue plan of care  []  Update interventions per flow sheet       []  Discharge due to:_  []  Other:_      Phil Bauer, PTA 10/21/2020  8:14 AM    Future Appointments   Date Time Provider Dewayne Gamboa   10/21/2020  9:45 AM Gerardo Rasheed [facility]   10/23/2020  9:45 AM Blaire Leigh PTA [facility]   11/9/2020 11:15 AM Nimesh Schulz MD [facility] BS AMB

## 2020-10-23 ENCOUNTER — HOSPITAL ENCOUNTER (OUTPATIENT)
Dept: PHYSICAL THERAPY | Age: 70
Discharge: HOME | End: 2020-10-23
Payer: MEDICARE

## 2020-10-23 PROCEDURE — 97110 THERAPEUTIC EXERCISES: CPT

## 2020-10-23 PROCEDURE — 97016 VASOPNEUMATIC DEVICE THERAPY: CPT

## 2020-10-23 NOTE — PROGRESS NOTES
PT DAILY TREATMENT NOTE 10-18    Patient Name: Judah Amor  Date:10/23/2020  : 1950  [x]  Patient  Verified  Payor: [place] MEDICARE / Plan: VA MEDICARE PART A & B / Product Type: Medicare /    In time:945  Out time:1035  Total Treatment Time (min): 50  Visit #: 5 of 12    Medicare/BCBS Only   Total Timed Codes (min):  35 1:1 Treatment Time:  35       Treatment Area: Pain in left knee [M25.562]    SUBJECTIVE  Pain Level (0-10 scale): 0/10  Any medication changes, allergies to medications, adverse drug reactions, diagnosis change, or new procedure performed?: [x] No    [] Yes (see summary sheet for update)  Subjective functional status/changes:   [] No changes reported  Pt stated that she just some stiffness today    OBJECTIVE    Modality rationale: decrease inflammation and decrease pain to improve the patients ability to increase ease with ADLs   Min Type Additional Details    [] Estim:  []Unatt       []IFC  []Premod                        []Other:  []w/ice   []w/heat  Position:  Location:    [] Estim: []Att    []TENS instruct  []NMES                    []Other:  []w/US   []w/ice   []w/heat  Position:  Location:    []  Traction: [] Cervical       []Lumbar                       [] Prone          []Supine                       []Intermittent   []Continuous Lbs:  [] before manual  [] after manual    []  Ultrasound: []Continuous   [] Pulsed                           []1MHz   []3MHz W/cm2:  Location:    []  Iontophoresis with dexamethasone         Location: [] Take home patch   [] In clinic    []  Ice     []  heat  []  Ice massage  []  Laser   []  Anodyne Position:  Location:    []  Laser with stim  []  Other:  Position:  Location:   15 [x]  Vasopneumatic Device Pressure:       [x] lo [] med [] hi   Temperature: [x] lo [] med [] hi   [] Skin assessment post-treatment:  []intact []redness- no adverse reaction    []redness  adverse reaction:     35 min Therapeutic Exercise:  [x] See flow sheet :   Rationale: increase ROM and increase strength to improve the patients ability to increase ease with ADLs    With   [x] TE   [] TA   [] neuro   [] other: Patient Education: [x] Review HEP    [] Progressed/Changed HEP based on:   [] positioning   [] body mechanics   [] transfers   [] heat/ice application    [] other:      Other Objective/Functional Measures:   Had no difficulty with exercises  Needed cueing with SL HR/TR  No complaint of increased pain during session     Pain Level (0-10 scale) post treatment: 0/10    ASSESSMENT/Changes in Function:   Pt is slowly progressing toward goals. Strength and range of motion in the left knee are slowly improving. Pt cont to have limp on the left. Cont to have decreased walking tolerance, but is improving. Cont to have some difficulty with sit to stands. Patient will continue to benefit from skilled PT services to modify and progress therapeutic interventions, address functional mobility deficits, address ROM deficits, address strength deficits, analyze and address soft tissue restrictions, analyze and cue movement patterns, analyze and modify body mechanics/ergonomics, address imbalance/dizziness and instruct in home and community integration to attain remaining goals. []  See Plan of Care  [x]  See progress note/recertification  []  See Discharge Summary         Progress towards goals / Updated goals:  1. Patient will improve FOTO score by 15 points in order to demonstrate a significant improvement in function. 2. Patient will improve left knee AROM 0-100 degrees in order to increase ease of ambulation. Progressing. AROM in the left knee is -4-96*. 10/21/20  3. Patient will improve left knee MMT to 5/5 in order to increase ease of stair negotiation  4. Patient will improve 30 second sit to stand test to 10x in order to increase ease of transfers at home.     PLAN  []  Upgrade activities as tolerated     [x]  Continue plan of care  []  Update interventions per flow sheet []  Discharge due to:_  []  Other:_      Jeremy Wiseman, PTA 10/23/2020  6:50 AM    Future Appointments   Date Time Provider Dewayne Gamboa   10/23/2020  9:45 AM Patricio Pena [facility]   11/9/2020 11:15 AM Viet Schulz MD [facility] BS AMB

## 2020-10-26 ENCOUNTER — HOSPITAL ENCOUNTER (OUTPATIENT)
Dept: PHYSICAL THERAPY | Age: 70
Discharge: HOME | End: 2020-10-26
Payer: MEDICARE

## 2020-10-26 PROCEDURE — 97016 VASOPNEUMATIC DEVICE THERAPY: CPT

## 2020-10-26 PROCEDURE — 97140 MANUAL THERAPY 1/> REGIONS: CPT

## 2020-10-26 PROCEDURE — 97110 THERAPEUTIC EXERCISES: CPT

## 2020-10-26 NOTE — PROGRESS NOTES
PT DAILY TREATMENT NOTE 10-18    Patient Name: Higinio Vasquez  Date:10/26/2020  : 1950  [x]  Patient  Verified  Payor: [place] MEDICARE / Plan: VA MEDICARE PART A & B / Product Type: Medicare /    In time:945  Out time:1040  Total Treatment Time (min): 54  Visit #: 6 of 12    Medicare/BCBS Only   Total Timed Codes (min):  40 1:1 Treatment Time:  40       Treatment Area: Pain in left knee [M25.562]    SUBJECTIVE  Pain Level (0-10 scale): 0/10  Any medication changes, allergies to medications, adverse drug reactions, diagnosis change, or new procedure performed?: [x] No    [] Yes (see summary sheet for update)  Subjective functional status/changes:   [] No changes reported  Pt stated that she just has stiffness today    OBJECTIVE    Modality rationale: decrease inflammation and decrease pain to improve the patients ability to increase ease with walking   Min Type Additional Details    [] Estim:  []Unatt       []IFC  []Premod                        []Other:  []w/ice   []w/heat  Position:  Location:    [] Estim: []Att    []TENS instruct  []NMES                    []Other:  []w/US   []w/ice   []w/heat  Position:  Location:    []  Traction: [] Cervical       []Lumbar                       [] Prone          []Supine                       []Intermittent   []Continuous Lbs:  [] before manual  [] after manual    []  Ultrasound: []Continuous   [] Pulsed                           []1MHz   []3MHz W/cm2:  Location:    []  Iontophoresis with dexamethasone         Location: [] Take home patch   [] In clinic    []  Ice     []  heat  []  Ice massage  []  Laser   []  Anodyne Position:  Location:    []  Laser with stim  []  Other:  Position:  Location:   15 [x]  Vasopneumatic Device Pressure:       [x] lo [] med [] hi   Temperature: [x] lo [] med [] hi   [x] Skin assessment post-treatment:  [x]intact []redness- no adverse reaction    []redness  adverse reaction:       32 min Therapeutic Exercise:  [x] See flow sheet : Rationale: increase ROM and increase strength to improve the patients ability to increase ease with ADls    8 min Manual Therapy:  Patella and grade 2-3 A/P tib mobs   Rationale: decrease pain, increase ROM and increase tissue extensibility to improve gait          With   [x] TE   [] TA   [] neuro   [] other: Patient Education: [x] Review HEP    [] Progressed/Changed HEP based on:   [] positioning   [] body mechanics   [] transfers   [] heat/ice application    [] other:      Other Objective/Functional Measures:   Flex AROM in left knee was 98* after mobs  No complaint of increased pain during session   Tolerated increases made today without difficulty     Pain Level (0-10 scale) post treatment: 0/10    ASSESSMENT/Changes in Function:   Pt is progressing slowly toward goals. Pt cont with decreased AROM in the left knee. Strength is improving. Pt cont to have slight limp on the left. Sit to stands are slowly improving. Patient will continue to benefit from skilled PT services to modify and progress therapeutic interventions, address functional mobility deficits, address ROM deficits, address strength deficits, analyze and address soft tissue restrictions, analyze and cue movement patterns, analyze and modify body mechanics/ergonomics, address imbalance/dizziness and instruct in home and community integration to attain remaining goals. []  See Plan of Care  [x]  See progress note/recertification  []  See Discharge Summary         Progress towards goals / Updated goals:  1. Patient will improve FOTO score by 15 points in order to demonstrate a significant improvement in function. 2. Patient will improve left knee AROM 0-100 degrees in order to increase ease of ambulation.   Progressing. AROM in the left knee is -4-96*. 10/21/20  3. Patient will improve left knee MMT to 5/5 in order to increase ease of stair negotiation  4.  Patient will improve 30 second sit to stand test to 10x in order to increase ease of transfers at home.     PLAN  []  Upgrade activities as tolerated     [x]  Continue plan of care  []  Update interventions per flow sheet       []  Discharge due to:_  []  Other:_      Raji Hamilton PTA 10/26/2020  7:59 AM    Future Appointments   Date Time Provider Dewayne Gamboa   10/26/2020  9:45 AM Kelly Chun, PTA [facility]   10/30/2020 11:15 AM Kelly Lay, PTA [facility]   11/2/2020  7:30 AM Kelly Lay, PTA [facility]   11/4/2020  9:00 AM Angel Pamela, PT [facility]   11/6/2020  9:00 AM Angel Pamela, PT [facility]   11/9/2020  7:30 AM Kelly Lay, PTA [facility]   11/9/2020 11:15 AM Flavio Schulz MD [facility] BS AMB   11/11/2020  7:30 AM Angel Pamela, PT [facility]   11/13/2020  8:15 AM Kelly Lay, PTA [facility]   11/16/2020  8:15 AM Angel Pamela, PT [facility]   11/18/2020  9:00 AM Kelly Lay, PTA [facility]   11/20/2020  9:00 AM Angel Pamela, PT [facility]

## 2020-10-30 ENCOUNTER — HOSPITAL ENCOUNTER (OUTPATIENT)
Dept: PHYSICAL THERAPY | Age: 70
Discharge: HOME | End: 2020-10-30
Payer: MEDICARE

## 2020-10-30 PROCEDURE — 97110 THERAPEUTIC EXERCISES: CPT

## 2020-10-30 PROCEDURE — 97016 VASOPNEUMATIC DEVICE THERAPY: CPT

## 2020-10-30 NOTE — PROGRESS NOTES
PT DAILY TREATMENT NOTE 10-18    Patient Name: Martínez Marking  Date:10/30/2020  : 1950  [x]  Patient  Verified  Payor: [place] MEDICARE / Plan: [place] MEDICARE PART A & B / Product Type: Medicare /    In time:1115  Out time:1215  Total Treatment Time (min): 60  Visit #: 7 of 12    Medicare/BCBS Only   Total Timed Codes (min):  45 1:1 Treatment Time:  39       Treatment Area: Pain in left knee [M25.562]    SUBJECTIVE  Pain Level (0-10 scale): 0/10  Any medication changes, allergies to medications, adverse drug reactions, diagnosis change, or new procedure performed?: [x] No    [] Yes (see summary sheet for update)  Subjective functional status/changes:   [] No changes reported  Pt stated that she did some of her exercises before coming to therapy today    OBJECTIVE    Modality rationale: decrease inflammation and decrease pain to improve the patients ability to increase ease with ADLs   Min Type Additional Details    [] Estim:  []Unatt       []IFC  []Premod                        []Other:  []w/ice   []w/heat  Position:  Location:    [] Estim: []Att    []TENS instruct  []NMES                    []Other:  []w/US   []w/ice   []w/heat  Position:  Location:    []  Traction: [] Cervical       []Lumbar                       [] Prone          []Supine                       []Intermittent   []Continuous Lbs:  [] before manual  [] after manual    []  Ultrasound: []Continuous   [] Pulsed                           []1MHz   []3MHz W/cm2:  Location:    []  Iontophoresis with dexamethasone         Location: [] Take home patch   [] In clinic    []  Ice     []  heat  []  Ice massage  []  Laser   []  Anodyne Position:  Location:    []  Laser with stim  []  Other:  Position:  Location:   15 [x]  Vasopneumatic Device Pressure:       [x] lo [] med [] hi   Temperature: [x] lo [] med [] hi   [x] Skin assessment post-treatment:  [x]intact []redness- no adverse reaction    []redness  adverse reaction:     45 min Therapeutic Exercise: [x] See flow sheet :   Rationale: increase ROM and increase strength to improve the patients ability to increase ease with ADls    With   [x] TE   [] TA   [] neuro   [] other: Patient Education: [x] Review HEP    [] Progressed/Changed HEP based on:   [] positioning   [] body mechanics   [] transfers   [] heat/ice application    [] other:      Other Objective/Functional Measures:   Had no difficulty with exercises   No difficulty with increases made with weight on leg press  Had 100* of flex in the knee today with heel slides     Pain Level (0-10 scale) post treatment: 0/10    ASSESSMENT/Changes in Function:   Pt is progressing well toward goals. Strength and range of motion cont to improve in the left knee. Had 100* of flex today. Cont with very slight limp on the left. Walking and standing tolerance cont to improve. Patient will continue to benefit from skilled PT services to modify and progress therapeutic interventions, address functional mobility deficits, address ROM deficits, address strength deficits, analyze and address soft tissue restrictions, analyze and cue movement patterns, analyze and modify body mechanics/ergonomics and instruct in home and community integration to attain remaining goals. []  See Plan of Care  [x]  See progress note/recertification  []  See Discharge Summary         Progress towards goals / Updated goals:  1. Patient will improve FOTO score by 15 points in order to demonstrate a significant improvement in function. 2. Patient will improve left knee AROM 0-100 degrees in order to increase ease of ambulation.   Progressing. AROM in the left knee is -4-96*. 10/21/20  3. Patient will improve left knee MMT to 5/5 in order to increase ease of stair negotiation  4. Patient will improve 30 second sit to stand test to 10x in order to increase ease of transfers at home.     PLAN  []  Upgrade activities as tolerated     [x]  Continue plan of care  []  Update interventions per flow sheet       []  Discharge due to:_  []  Other:_      Antionette Pagan, PTA 10/30/2020  6:39 AM    Future Appointments   Date Time Provider Dewayne Gamboa   10/30/2020 11:15 AM Karli Pardeep, PTA [facility]CPTP[facility]   11/2/2020  7:30 AM Karliisadora Roberto, PTA [facility]   11/4/2020  9:00 AM Nate Karele, PT [facility]CP[facility]   11/6/2020  9:00 AM Nate Citlali, PT [facility]CP[facility]   11/9/2020  7:30 AM Karliisadora Roberto, PTA [facility]CPTP[facility]   11/9/2020 11:15 AM [facility]   11/11/2020  7:30 AM Nateheather Godwin, PT [id number] [facility]   11/13/2020  8:15 AM Karli Roberto, PTA VZVRMYP [facility]   11/16/2020  8:15 AM Nate Godwin, PT [id number] [facility]   11/18/2020  9:00 AM Karli Roberto, PTA [facility]CPTP[facility]   11/20/2020  9:00 AM Nate Godwin, PT [facility]CPTP[facility]

## 2020-11-02 ENCOUNTER — HOSPITAL ENCOUNTER (OUTPATIENT)
Dept: PHYSICAL THERAPY | Age: 70
Discharge: HOME | End: 2020-11-02
Payer: MEDICARE

## 2020-11-02 PROCEDURE — 97016 VASOPNEUMATIC DEVICE THERAPY: CPT

## 2020-11-02 PROCEDURE — 97110 THERAPEUTIC EXERCISES: CPT

## 2020-11-02 NOTE — PROGRESS NOTES
PT DAILY TREATMENT NOTE 10-18    Patient Name: Melissa Vidal  Date:2020  : 1950  [x]  Patient  Verified  Payor: VA MEDICARE / Plan: VA MEDICARE PART A & B / Product Type: Medicare /    In time:730  Out time:825  Total Treatment Time (min): 55  Visit #: 8 of 12    Medicare/BCBS Only   Total Timed Codes (min):  40 1:1 Treatment Time:  40       Treatment Area: Pain in left knee [M25.562]    SUBJECTIVE  Pain Level (0-10 scale): 0/10  Any medication changes, allergies to medications, adverse drug reactions, diagnosis change, or new procedure performed?: [x] No    [] Yes (see summary sheet for update)  Subjective functional status/changes:   [] No changes reported  Pt stated that she is doing well today    OBJECTIVE    Modality rationale: decrease inflammation to improve the patients ability to increase ease with ADls   Min Type Additional Details    [] Estim:  []Unatt       []IFC  []Premod                        []Other:  []w/ice   []w/heat  Position:  Location:    [] Estim: []Att    []TENS instruct  []NMES                    []Other:  []w/US   []w/ice   []w/heat  Position:  Location:    []  Traction: [] Cervical       []Lumbar                       [] Prone          []Supine                       []Intermittent   []Continuous Lbs:  [] before manual  [] after manual    []  Ultrasound: []Continuous   [] Pulsed                           []1MHz   []3MHz W/cm2:  Location:    []  Iontophoresis with dexamethasone         Location: [] Take home patch   [] In clinic    []  Ice     []  heat  []  Ice massage  []  Laser   []  Anodyne Position:  Location:    []  Laser with stim  []  Other:  Position:  Location:   15 [x]  Vasopneumatic Device Pressure:       [x] lo [] med [] hi   Temperature: [x] lo [] med [] hi   [x] Skin assessment post-treatment:  [x]intact []redness- no adverse reaction    []redness  adverse reaction:     40 min Therapeutic Exercise:  [x] See flow sheet :   Rationale: increase ROM and increase strength to improve the patients ability to increase ease with ADLs    With   [x] TE   [] TA   [] neuro   [] other: Patient Education: [x] Review HEP    [] Progressed/Changed HEP based on:   [] positioning   [] body mechanics   [] transfers   [] heat/ice application    [] other:      Other Objective/Functional Measures:   Had no difficulty with exercises  Was challenged with changes in program  Flex increased to 102* with heel slides     Pain Level (0-10 scale) post treatment: 0/10    ASSESSMENT/Changes in Function:   Pt is progressing well toward goals. Strength and AROM are improving. Pt no longer has limp with ambulation. Patient will continue to benefit from skilled PT services to modify and progress therapeutic interventions, address functional mobility deficits, address ROM deficits, address strength deficits, analyze and cue movement patterns, analyze and modify body mechanics/ergonomics, address imbalance/dizziness and instruct in home and community integration to attain remaining goals. []  See Plan of Care  [x]  See progress note/recertification  []  See Discharge Summary         Progress towards goals / Updated goals:  1. Patient will improve FOTO score by 15 points in order to demonstrate a significant improvement in function. 2. Patient will improve left knee AROM 0-100 degrees in order to increase ease of ambulation.   Progressing. AROM in the left knee is -4-96*. 10/21/20  3. Patient will improve left knee MMT to 5/5 in order to increase ease of stair negotiation  4. Patient will improve 30 second sit to stand test to 10x in order to increase ease of transfers at home.     PLAN  []  Upgrade activities as tolerated     [x]  Continue plan of care  []  Update interventions per flow sheet       []  Discharge due to:_  []  Other:_      Ariadna Arias PTA 11/2/2020  6:38 AM    Future Appointments   Date Time Provider Dewayne Gamboa   11/2/2020  7:30 AM Karolyn Kwong PTA [facility] 11/4/2020  9:00 AM Daniela Iniguez, PT MMCPTPB [address]   11/6/2020  9:00 AM Daniela Iniguez, PT MMCPTPB [address]   11/9/2020  7:30 AM Selina Stephanieat, PTA [id number] [address]   11/9/2020 11:15 AM Jeffrey Schulz MD [facility]   11/11/2020  7:30 AM Daniela Iniguez, PT QMPNOMA [address]   11/13/2020  8:15 AM Selina Stephanieat, PTA SQMAGVB [address]   11/16/2020  8:15 AM Daniela Iniguez, PT NHVACEO [address]   11/18/2020  9:00 AM Selina Stephanieat, PTA MMCPTPB [address]   11/20/2020  9:00 AM Daniela Iniguez, PT MMCPTPB [address]

## 2020-11-04 ENCOUNTER — HOSPITAL ENCOUNTER (OUTPATIENT)
Dept: PHYSICAL THERAPY | Age: 70
Discharge: HOME | End: 2020-11-04
Payer: MEDICARE

## 2020-11-04 PROCEDURE — 97110 THERAPEUTIC EXERCISES: CPT

## 2020-11-04 PROCEDURE — 97016 VASOPNEUMATIC DEVICE THERAPY: CPT

## 2020-11-04 NOTE — PROGRESS NOTES
PT DAILY TREATMENT NOTE     Patient Name: Bailee Nuñez  Date:2020  : 1950  [x]  Patient  Verified  Payor: VA MEDICARE / Plan: VA MEDICARE PART A & B / Product Type: Medicare /    In time: 9:04  Out time: 10:00  Total Treatment Time (min): 56  Visit #: 9 of 12    Medicare/BCBS Only   Total Timed Codes (min):  41 1:1 Treatment Time:  41       Treatment Area: Pain in left knee [M25.562]    SUBJECTIVE  Pain Level (0-10 scale):  0/10  Any medication changes, allergies to medications, adverse drug reactions, diagnosis change, or new procedure performed?: [x] No    [] Yes (see summary sheet for update)  Subjective functional status/changes:   [] No changes reported  Pt. Reports she has been doing pretty good. She reports having some pain with bending her knee.      OBJECTIVE    Modality rationale: decrease edema, decrease inflammation and decrease pain to improve the patients ability to increase ease of ADLs   Min Type Additional Details    [] Estim:  []Unatt       []IFC  []Premod                        []Other:  []w/ice   []w/heat  Position:  Location:    [] Estim: []Att    []TENS instruct  []NMES                    []Other:  []w/US   []w/ice   []w/heat  Position:  Location:    []  Traction: [] Cervical       []Lumbar                       [] Prone          []Supine                       []Intermittent   []Continuous Lbs:  [] before manual  [] after manual    []  Ultrasound: []Continuous   [] Pulsed                           []1MHz   []3MHz W/cm2:  Location:    []  Iontophoresis with dexamethasone         Location: [] Take home patch   [] In clinic    []  Ice     []  heat  []  Ice massage  []  Laser   []  Anodyne Position:  Location:    []  Laser with stim  []  Other:  Position:  Location:   15 [x]  Vasopneumatic Device Pressure:       [x] lo [] med [] hi   Temperature: [x] lo [] med [] hi   [x] Skin assessment post-treatment:  [x]intact []redness- no adverse reaction    []redness  adverse reaction:     41 min Therapeutic Exercise:  [x] See flow sheet :   Rationale: increase ROM and increase strength to improve the patients ability to increase ease of ADLs          With   [x] TE   [] TA   [] neuro   [] other: Patient Education: [x] Review HEP    [] Progressed/Changed HEP based on:   [] positioning   [] body mechanics   [] transfers   [] heat/ice application    [] other:      Other Objective/Functional Measures:   Left knee AROM: 4-100 degrees  30 second sit to stand test: 11x  Pt. Tolerated increased resistance during therex well  No difficulty with 4 inch eccentric step downs. Pain Level (0-10 scale) post treatment:  0/10    ASSESSMENT/Changes in Function:  Pt. Is progressing slowly towards goals. She does demonstrate improving left knee mobility and improving transfers. She also demonstrates improving strength during therex. Patient will continue to benefit from skilled PT services to modify and progress therapeutic interventions, address functional mobility deficits, address ROM deficits, address strength deficits, analyze and address soft tissue restrictions, analyze and cue movement patterns, analyze and modify body mechanics/ergonomics and assess and modify postural abnormalities to attain remaining goals. Progress towards goals / Updated goals:  1. Patient will improve FOTO score by 15 points in order to demonstrate a significant improvement in function. 2. Patient will improve left knee AROM 0-100 degrees in order to increase ease of ambulation.   Progressin-100 degrees (20)  3. Patient will improve left knee MMT to 5/5 in order to increase ease of stair negotiation  4. Patient will improve 30 second sit to stand test to 10x in order to increase ease of transfers at home.   Met: 11x (20)    PLAN  []  Upgrade activities as tolerated     [x]  Continue plan of care  []  Update interventions per flow sheet       []  Discharge due to:_  []  Other:_      Walter Pilling [place], PT 11/4/2020  7:17 AM    Future Appointments   Date Time Provider Dewayne Gamboa   11/4/2020  9:00 AM Nate Godwin, PT [facility]   11/6/2020  9:00 AM Nate Godwin, PT [facility]CP[facility]   11/9/2020  7:30 AM Karli Roberto, PTA [facility]   11/9/2020 11:15 AM Cathy Schulz MD [facility]   11/11/2020  7:30 AM Nate Godwin, PT [id number] [facility]   11/13/2020  8:15 AM Karli Roberto, PTA CSZADGL [facility]   11/16/2020  8:15 AM Nate Godwin, PT [id number] [facility]   11/18/2020  9:00 AM Karli Roberto, PTA MMCPTPB [facility]   11/20/2020  9:00 AM Nate Godwin, PT MMCPTPB [facility]

## 2020-11-06 ENCOUNTER — HOSPITAL ENCOUNTER (OUTPATIENT)
Dept: PHYSICAL THERAPY | Age: 70
Discharge: HOME | End: 2020-11-06
Payer: MEDICARE

## 2020-11-06 PROCEDURE — 97110 THERAPEUTIC EXERCISES: CPT

## 2020-11-06 NOTE — PROGRESS NOTES
[facility]  [facility] [employer] [facility]  [address]  [phone] fax    Continued Plan of Care/ Re-certification for Physical Therapy Services    Patient name: Carrie Nicole Start of Care: 2020   Referral source: Brandon Cardozo [place] : 1950               Medical Diagnosis: Pain in left knee [M25.562]  Payor: VA MEDICARE / Plan: VA MEDICARE PART A & B / Product Type: Medicare /  Onset Date: 20               Treatment Diagnosis:  Left knee pain   Prior Hospitalization: see medical history Provider#: [id number]   Medications: Verified on Patient summary List    Comorbidities: arthritis, depression   Prior Level of Function: Ind with ambulation, Ind with ADLs, lives alone    Visits from Start of Care:  18    Missed Visits: 0    The Plan of Care and following information is based on the patient's current status:  Goal:  Patient will improve FOTO score by 15 points in order to demonstrate a significant improvement in function. Status at last note/certification: 62  Current Status: met    Goal: Patient will improve left knee AROM 0-100 degrees in order to increase ease of ambulation. Status at last note/certification: 1-02 degrees  Current Status: not met    Goal: Patient will improve left knee MMT to 5/5 in order to increase ease of stair negotiation  Status at last note/certification: ext: 4+/5 flex: 4/5  Current Status: not met    Goal: Patient will improve 30 second sit to stand test to 10x in order to increase ease of transfers at home.   Status at last note/certification: 9x  Current Status: met    Key functional changes:  Improving FOTO score and improved sit to stand transfers      Problems/ barriers to goal attainment: none     Problem List: pain affecting function, decrease ROM, decrease strength, impaired gait/ balance, decrease ADL/ functional abilitiies, decrease activity tolerance, decrease flexibility/ joint mobility and decrease transfer abilities    Treatment Plan: Therapeutic exercise, Therapeutic activities, Neuromuscular re-education, Physical agent/modality, Gait/balance training, Manual therapy, Patient education, Self Care training and Functional mobility training     Patient Goal (s) has been updated and includes: to have less pain     Goals for this certification period to be accomplished in 4 weeks:  1. Patient will improve left knee AROM 0-110 degrees in order to increase ease of ambulation. 2. Patient will improve left knee MMT to 5/5 in order to increase ease of ADLs. 3. Patient will demonstrate good understanding of updated HEP in order to continue to improve following D/C. Frequency / Duration: Patient to be seen 2 times per week for 4 weeks:    Assessment / Recommendations: pt. Is progressing with physical therapy. FOTO score improved to 66 points indicated a significant improvement in function. Left knee AROM 4-100 degrees. 30 second sit to stand test improved to 11x. Left knee MMT ext: 4+/5 flex: 4/5. Skilled PT is medically necessary in order to improve left knee mobility and strength in order to increase ease of ambulation and improve quality of life. Certification Period:  11/6/20-12/5/20    Nguyễn Damian, PT 11/6/2020 9:39 AM    ________________________________________________________________________  I certify that the above Therapy Services are being furnished while the patient is under my care. I agree with the treatment plan and certify that this therapy is necessary. [] I have read the above and request that my patient continue as recommended.   [] I have read the above report and request that my patient continue therapy with the following changes/special instructions: _______________________________________  [] I have read the above report and request that my patient be discharged from therapy    Physician's Signature:____________Date:_________TIME:________    ** Signature, Date and Time must be completed for valid certification **    Please sign and return to [facility]  Girma Guillory  [address] [place] [address]  [phone] fax

## 2020-11-06 NOTE — PROGRESS NOTES
PT DAILY TREATMENT NOTE     Patient Name: Camryn Fuchs  Date:2020  : 1950  [x]  Patient  Verified  Payor: VA MEDICARE / Plan: VA MEDICARE PART A & B / Product Type: Medicare /    In time: 9:00  Out time: 9:50  Total Treatment Time (min): 50  Visit #: 10 of 12    Medicare/BCBS Only   Total Timed Codes (min):  50 1:1 Treatment Time:  50       Treatment Area: Pain in left knee [M25.562]    SUBJECTIVE  Pain Level (0-10 scale):  0/10  Any medication changes, allergies to medications, adverse drug reactions, diagnosis change, or new procedure performed?: [x] No    [] Yes (see summary sheet for update)  Subjective functional status/changes:   [] No changes reported  Pt. Reports she is doing pretty good but wants to be able to bend her knee more.      OBJECTIVE    Modality rationale: decrease inflammation and decrease pain to improve the patients ability to increase ease of ADLs   Min Type Additional Details    - Estim:  -Unatt       -IFC  -Premod                        -Other:  -w/ice   -w/heat  Position:  Location:    - Estim: -Att    -TENS instruct  -NMES                    -Other:  -w/US   -w/ice   -w/heat  Position:  Location:    -  Traction: - Cervical       -Lumbar                       - Prone          -Supine                       -Intermittent   -Continuous Lbs:  - before manual  - after manual    -  Ultrasound: -Continuous   - Pulsed                           -1MHz   -3MHz W/cm2:  Location:    -  Iontophoresis with dexamethasone         Location: - Take home patch   - In clinic   10 -   X Ice     -  heat  -  Ice massage  -  Laser   -  Anodyne Position: seated  Location: left knee    -  Laser with stim  -  Other:  Position:  Location:    -  Vasopneumatic Device Pressure:       - lo - med - hi   Temperature: - lo - med - hi   - x Skin assessment post-treatment:   X -intact -redness- no adverse reaction    -redness  adverse reaction:     40 min Therapeutic Exercise:  [x] See flow sheet : Rationale: increase ROM and increase strength to improve the patients ability to increase ease of ADLs          With   [x] TE   [] TA   [] neuro   [] other: Patient Education: [x] Review HEP    [] Progressed/Changed HEP based on:   [] positioning   [] body mechanics   [] transfers   [] heat/ice application    [] other:      Other Objective/Functional Measures:    FOTO: 66  Left knee AROM: 4-100 degrees  Left knee MMT: ext: 4+/5 flex: 4/5     Pain Level (0-10 scale) post treatment: 0/10    ASSESSMENT/Changes in Function:     See progress note     Progress towards goals / Updated goals:  See progress note    PLAN  []  Upgrade activities as tolerated     [x]  Continue plan of care  []  Update interventions per flow sheet       []  Discharge due to:_  []  Other:_      Lenny Black, SANTOSH 11/6/2020  7:52 AM    Future Appointments   Date Time Provider Dewayne Gamboa   11/6/2020  9:00 AM Diya Woods, PT [email or web address] [address]   11/9/2020  7:30 AM Abdulkadir Kim PTA [id number] [address]   11/9/2020 11:15 AM Jessica Schulz MD [facility]   11/11/2020  7:30 AM Diya Woods, PT IHVYDAZ [address]   11/13/2020  8:15 AM Abdulkadir Kim PTA [id number] [address]   11/16/2020  8:15 AM Diya Woods, PT [id number] [address]   11/18/2020  9:00 AM Abdulkadir Kim PTA [id number] [address]   11/20/2020  9:00 AM Diya Woods, PT MMCPTPB [address]

## 2020-11-09 ENCOUNTER — HOSPITAL ENCOUNTER (OUTPATIENT)
Dept: PHYSICAL THERAPY | Age: 70
Discharge: HOME | End: 2020-11-09
Payer: MEDICARE

## 2020-11-09 ENCOUNTER — OFFICE VISIT (OUTPATIENT)
Dept: ORTHOPEDIC SURGERY | Age: 70
End: 2020-11-09
Payer: MEDICARE

## 2020-11-09 VITALS
DIASTOLIC BLOOD PRESSURE: 55 MMHG | BODY MASS INDEX: 24.09 KG/M2 | TEMPERATURE: 97.5 F | OXYGEN SATURATION: 99 % | SYSTOLIC BLOOD PRESSURE: 116 MMHG | RESPIRATION RATE: 16 BRPM | WEIGHT: 141.1 LBS | HEIGHT: 64 IN | HEART RATE: 86 BPM

## 2020-11-09 DIAGNOSIS — M76.812 ANTERIOR TIBIALIS TENDINITIS OF LEFT LOWER EXTREMITY: Primary | ICD-10-CM

## 2020-11-09 PROCEDURE — G8510 SCR DEP NEG, NO PLAN REQD: HCPCS | Performed by: ORTHOPAEDIC SURGERY

## 2020-11-09 PROCEDURE — G8427 DOCREV CUR MEDS BY ELIG CLIN: HCPCS | Performed by: ORTHOPAEDIC SURGERY

## 2020-11-09 PROCEDURE — G8536 NO DOC ELDER MAL SCRN: HCPCS | Performed by: ORTHOPAEDIC SURGERY

## 2020-11-09 PROCEDURE — 1101F PT FALLS ASSESS-DOCD LE1/YR: CPT | Performed by: ORTHOPAEDIC SURGERY

## 2020-11-09 PROCEDURE — 3017F COLORECTAL CA SCREEN DOC REV: CPT | Performed by: ORTHOPAEDIC SURGERY

## 2020-11-09 PROCEDURE — 97110 THERAPEUTIC EXERCISES: CPT

## 2020-11-09 PROCEDURE — G8420 CALC BMI NORM PARAMETERS: HCPCS | Performed by: ORTHOPAEDIC SURGERY

## 2020-11-09 PROCEDURE — G8399 PT W/DXA RESULTS DOCUMENT: HCPCS | Performed by: ORTHOPAEDIC SURGERY

## 2020-11-09 PROCEDURE — 97016 VASOPNEUMATIC DEVICE THERAPY: CPT

## 2020-11-09 PROCEDURE — 99212 OFFICE O/P EST SF 10 MIN: CPT | Performed by: ORTHOPAEDIC SURGERY

## 2020-11-09 PROCEDURE — G9899 SCRN MAM PERF RSLTS DOC: HCPCS | Performed by: ORTHOPAEDIC SURGERY

## 2020-11-09 PROCEDURE — 1090F PRES/ABSN URINE INCON ASSESS: CPT | Performed by: ORTHOPAEDIC SURGERY

## 2020-11-09 NOTE — PROGRESS NOTES
AMBULATORY PROGRESS NOTE      Patient: Sylvia Ken             MRN: [id number]     SSN: [id number] Body mass index is 24.22 kg/m². YOB: 1950     AGE: 79 y.o. EX: female    PCP: XIOMARA Parkinson       IMPRESSION //  DIAGNOSIS AND TREATMENT PLAN      DIAGNOSES  1. Anterior tibialis tendinitis of left lower extremity        No orders of the defined types were placed in this encounter. PLAN:    1. Patient wished to continue her current treatment. RTO- as needed// patient is not having any pain. HPI //  Sumi D 25 IS A 79 y.o. female who presents to my outpatient office for follow up evaluation of: left ankle pain. At the last OV, I placed the pt into a left AS/AC ankle brace and instructed patient to follow up with Dr. Aren Ambriz for follow up of knee-replacement. Since the last OV, Sylvia Ken reports much improved pain in her ankle. She is wearing memory foam sketchers in the office. Visit Vitals  BP (!) 116/55 (BP 1 Location: Left arm, BP Patient Position: Sitting)   Pulse 86   Temp 97.5 °F (36.4 °C) (Temporal)   Resp 16   Ht 5' 4\" (1.626 m)   Wt 141 lb 1.6 oz (64 kg)   SpO2 99%   BMI 24.22 kg/m²       ANKLE/FOOT left    Psychiatry: Alert, oriented x 3 (name,place,time of day); speech normal in context and clarity, memory intact grossly, no involuntary movements - tremors, no dementia  Gait: normal  Tenderness:  None across anterior ankle, mild to midfoot   Cutaneous: WNL  Joint Motion: pain with PF across anterior part of ankle. Joint / Tendon Stability: No Ankle or Subtalar instability or joint laxity. No peroneal sublux ability or dislocation  Alignment: neutral Hindfoot, none Metatarsus Adductus Metatarsus. Neuro Motor/Sensory: NL/NL,  Vascular: NL foot/ankle pulses,   Lymphatics: No extremity lymphedema, No calf swelling, no tenderness to calf muscles.           CHART REVIEW     Patient Active Problem List Diagnosis Code    Bilateral SI joint pain M25.50    Low back pain M54.5    Weakness R53.1    Tremor R25.1    Neck pain, chronic M54.2, G89.29    Unsteady gait R26.81    Headache(784.0) R51    Sacroiliac dysfunction M53.3    Lumbar pain M54.5    Bulging lumbar disc M51.26    Facet hypertrophy of lumbar region M47.816    HNP (herniated nucleus pulposus), cervical M50.20    Myofascial pain M79.18    Sacroiliac joint dysfunction of both sides M53.3    Facet arthritis of lumbar region M47.816    Spondylosis of cervical region without myelopathy or radiculopathy M47.812    Primary localized osteoarthritis of left knee M17.12    Localized osteoarthritis of left knee M17.12        Camryn Fuchs has been experiencing pain and discomfort confirmed as outlined in the pain assessment outlined below. Pain Assessment  11/9/2020   Location of Pain Ankle   Pain Location Comment -   Location Modifiers Left   Severity of Pain 0   Quality of Pain -   Quality of Pain Comment -   Duration of Pain -   Frequency of Pain -   Frequency of Pain Comment -   Date Pain First Started -   Aggravating Factors -   Aggravating Factors Comment -   Limiting Behavior -   Relieving Factors -   Relieving Factors Comment -   Result of Injury -   Work-Related Injury -   Type of Injury -        Camryn Fuchs  has a past medical history of Anxiety disorder, Arthritis, GERD (gastroesophageal reflux disease), Headache(784.0), Hearing reduced, Left knee pain, Lower back pain (July 2010), Menopause, Osteoarthritis of left knee, Pain of left thumb, Primary localized osteoarthritis of left knee (8/17/2020), Psychiatric disorder, Sacroiliitis ([facility] [address] (9/15/2010), SI (sacroiliac) joint dysfunction, Sinus trouble, and Ulcer (2006).      Patients is employed at:         Past Medical History:   Diagnosis Date    Anxiety disorder     Arthritis     Basal joint arthritis, left thumb    GERD (gastroesophageal reflux disease)     Headache(784.0)  Hearing reduced     Left knee pain     Lower back pain July 2010    Menopause     Age 39    Osteoarthritis of left knee     Pain of left thumb     Primary localized osteoarthritis of left knee 8/17/2020    Psychiatric disorder     depression    Sacroiliitis ([address] 9/15/2010    SI (sacroiliac) joint dysfunction     Sinus trouble     Ulcer 2006    polyp, positive heme occult stools, denies ulcer     Past Surgical History:   Procedure Laterality Date    HX DILATION AND CURETTAGE      HX ORTHOPAEDIC  9/15/2010    Left SI joint injection 30mg Celestone and 4 cc Lidocaine    HX TUBAL LIGATION       Current Outpatient Medications   Medication Sig    omega-3s-dha-epa-fish oil (Fish Oil) 350-600 mg cap Take  by mouth daily.  ascorbic acid-multivit-min-MSM 1,000-1,000 mg pwep Take  by mouth.  ferrous sulfate (IRON PO) Take  by mouth.  acetaminophen (TYLENOL) 500 mg tablet Take 1,000 mg by mouth every six (6) hours as needed for Pain. PRN pain    buPROPion SR (WELLBUTRIN SR) 150 mg SR tablet Take 150 mg by mouth daily. Indications: anxiousness associated with depression    rosuvastatin (Crestor) 10 mg tablet Take 10 mg by mouth nightly.  FLUoxetine (PROZAC) 20 mg capsule Take 60 mg by mouth daily. Indications: anxiousness associated with depression    traZODone (DESYREL) 50 mg tablet Take 100 mg by mouth nightly.  cyanocobalamin (VITAMIN B-12) 500 mcg tablet Take 500 mcg by mouth daily.  calcium 600 mg Cap Take  by mouth daily.  OMEPRAZOLE MAGNESIUM (PRILOSEC OTC PO) Take 40 mg by mouth daily.  ALPRAZolam (XANAX) 0.5 mg tablet Take  by mouth nightly as needed. No current facility-administered medications for this visit.       Allergies   Allergen Reactions    Topamax [Topiramate] Other (comments)     Whole body tingling  Blurry vision     Social History     Occupational History    Occupation: teacher's asst     Comment: full time   Tobacco Use    Smoking status: Never Smoker    Smokeless tobacco: Never Used    Tobacco comment:  SMOKED    Substance and Sexual Activity    Alcohol use: No     Alcohol/week: 0.0 standard drinks    Drug use: No    Sexual activity: Not Currently     Comment: BTL     Family History   Problem Relation Age of Onset    Heart Failure Mother          at age 80 of congestive heart failure    Heart Disease Mother     Cancer Father          at age 54 of lung cancer; smoker and asbestos exposure       THE  FOR Jonelle Isidro MD 2020 . DIAGNOSTIC IMAGING  LAB DATA      Lab Results   Component Value Date/Time    Hemoglobin A1c 5.6 08/10/2020 03:19 PM    //   Lab Results   Component Value Date/Time    Glucose 116 (H) 08/10/2020 03:19 PM        No results found for: ECQ0ITMU, MXV6ANLC      No results found for: VITD3, XQVID2, XQVID3, XQVID, VD3RIA, LJYM07NYTZU      REVIEW OF SYSTEMS : 2020  ALL BELOW ARE Negative except : SEE HPI     CONSTITUTIONAL: No weight loss  PSYCHOLOGICAL : No Feelings of anxiety, depression, agitation  EYES: No blurred vision and no eye discharge. NO eye pain, double vision  ENT: No nasal discharge. No ear pain. CARDIOVASCULAR: No chest pain and no diaphoresis. RESPIRATORY: No cough, no hemoptysis. GI: No vomiting, no diarrhea   : No urinary frequency and no dysuria. MUSCULOSKELETAL: see HPI  SKIN: No rashes. NEURO:  No dizziness,weakness, headaches// No visual changes or confusion, or seizures,   ENDOCRINE: No polyphagia and no polydipsia. HEMATOLOGY: No bleeding tendencies. DIAGNOSTIC IMAGING    No notes on file    Please see above section of this report. I have personally reviewed the results of the above study. The interpretation of this study is my professional opinion. Written by Miri Kebede, as dictated by Dr. Kev Neff. I, Dr. Kev Neff, confirm that all documentation is accurate.

## 2020-11-09 NOTE — PROGRESS NOTES
PT DAILY TREATMENT NOTE     Patient Name: Jesenia Matos  Date:2020  : 1950  [x]  Patient  Verified  Payor: [place] MEDICARE / Plan: [place] MEDICARE PART A & B / Product Type: Medicare /    In time:730  Out time:825  Total Treatment Time (min): 55  Visit #: 1 of 8    Medicare/BCBS Only   Total Timed Codes (min):  40 1:1 Treatment Time:  40       Treatment Area: Pain in left knee [M25.562]    SUBJECTIVE  Pain Level (0-10 scale): 0/10  Any medication changes, allergies to medications, adverse drug reactions, diagnosis change, or new procedure performed?: [x] No    [] Yes (see summary sheet for update)  Subjective functional status/changes:   [] No changes reported  Pt stated that she just has a little pain with exercising     OBJECTIVE    Modality rationale: decrease inflammation to improve the patients ability to increase ease with ADLs   Min Type Additional Details    [] Estim:  []Unatt       []IFC  []Premod                        []Other:  []w/ice   []w/heat  Position:  Location:    [] Estim: []Att    []TENS instruct  []NMES                    []Other:  []w/US   []w/ice   []w/heat  Position:  Location:    []  Traction: [] Cervical       []Lumbar                       [] Prone          []Supine                       []Intermittent   []Continuous Lbs:  [] before manual  [] after manual    []  Ultrasound: []Continuous   [] Pulsed                           []1MHz   []3MHz W/cm2:  Location:    []  Iontophoresis with dexamethasone         Location: [] Take home patch   [] In clinic    []  Ice     []  heat  []  Ice massage  []  Laser   []  Anodyne Position:  Location:    []  Laser with stim  []  Other:  Position:  Location:   15 [x]  Vasopneumatic Device Pressure:       [x] lo [] med [] hi   Temperature: [x] lo [] med [] hi   [x] Skin assessment post-treatment:  [x]intact []redness- no adverse reaction    []redness  adverse reaction:     40 min Therapeutic Exercise:  [x] See flow sheet :   Rationale: increase ROM and increase strength to improve the patients ability to increase ease with ADLs    With   [x] TE   [] TA   [] neuro   [] other: Patient Education: [x] Review HEP    [] Progressed/Changed HEP based on:   [] positioning   [] body mechanics   [] transfers   [] heat/ice application    [] other:      Other Objective/Functional Measures:   Had some difficulty with eccentric step downs with 6\" step  Was unable to perform leg press with 80# so did 60# DL     Pain Level (0-10 scale) post treatment: 0/10    ASSESSMENT/Changes in Function:   Pt is slowly progressing toward goals. Strength and AROM is improving. Pt has minor limp on the left. Cont to report less difficulty with walking and performing ADLs    Patient will continue to benefit from skilled PT services to modify and progress therapeutic interventions, address functional mobility deficits, address ROM deficits, address strength deficits, analyze and address soft tissue restrictions, analyze and cue movement patterns, analyze and modify body mechanics/ergonomics, address imbalance/dizziness and instruct in home and community integration to attain remaining goals. []  See Plan of Care  [x]  See progress note/recertification  []  See Discharge Summary         Progress towards goals / Updated goals:  1. Patient will improve left knee AROM 0-110 degrees in order to increase ease of ambulation. 2. Patient will improve left knee MMT to 5/5 in order to increase ease of ADLs. 3. Patient will demonstrate good understanding of updated HEP in order to continue to improve following D/C.      PLAN  []  Upgrade activities as tolerated     [x]  Continue plan of care  []  Update interventions per flow sheet       []  Discharge due to:_  []  Other:_      Kassie Calvert, NIMO 11/9/2020  6:39 AM    Future Appointments   Date Time Provider Dewayne Gamboa   11/9/2020  7:30 AM Kylie Yan MMCPTPB [facility]   11/9/2020 11:15 AM Rachel Schulz MD [facility] BS AMB   11/11/2020  7:30 AM Gordon Kras, PT [id number] [facility]   11/13/2020  8:15 AM Zuri Nicole, PTA [id number] [facility]   11/16/2020  8:15 AM Gordon Kras, PT [id number] [facility]   11/18/2020  9:00 AM Zuri Nicole, PTA MMCPTPB [facility]   11/20/2020  9:00 AM Gordon Kras, PT MMCPTPB [facility]

## 2020-11-11 ENCOUNTER — HOSPITAL ENCOUNTER (OUTPATIENT)
Dept: PHYSICAL THERAPY | Age: 70
Discharge: HOME | End: 2020-11-11
Payer: MEDICARE

## 2020-11-11 PROCEDURE — 97110 THERAPEUTIC EXERCISES: CPT

## 2020-11-13 ENCOUNTER — HOSPITAL ENCOUNTER (OUTPATIENT)
Dept: PHYSICAL THERAPY | Age: 70
Discharge: HOME | End: 2020-11-13
Payer: MEDICARE

## 2020-11-13 PROCEDURE — 97016 VASOPNEUMATIC DEVICE THERAPY: CPT

## 2020-11-13 PROCEDURE — 97110 THERAPEUTIC EXERCISES: CPT

## 2020-11-13 NOTE — PROGRESS NOTES
PT DAILY TREATMENT NOTE     Patient Name: Mandy Rosenthal  Date:2020  : 1950  [x]  Patient  Verified  Payor: [place] MEDICARE / Plan: [place] MEDICARE PART A & B / Product Type: Medicare /    In time:815  Out time:910  Total Treatment Time (min): 54  Visit #: 3 of 8    Medicare/BCBS Only   Total Timed Codes (min):  40 1:1 Treatment Time:  40       Treatment Area: Pain in left knee [M25.562]    SUBJECTIVE  Pain Level (0-10 scale): 0/10  Any medication changes, allergies to medications, adverse drug reactions, diagnosis change, or new procedure performed?: [x] No    [] Yes (see summary sheet for update)  Subjective functional status/changes:   [] No changes reported  Pt stated that she is doing pretty good today    OBJECTIVE    Modality rationale: decrease inflammation to improve the patients ability to increase ease with ADLs   Min Type Additional Details    [] Estim:  []Unatt       []IFC  []Premod                        []Other:  []w/ice   []w/heat  Position:  Location:    [] Estim: []Att    []TENS instruct  []NMES                    []Other:  []w/US   []w/ice   []w/heat  Position:  Location:    []  Traction: [] Cervical       []Lumbar                       [] Prone          []Supine                       []Intermittent   []Continuous Lbs:  [] before manual  [] after manual    []  Ultrasound: []Continuous   [] Pulsed                           []1MHz   []3MHz W/cm2:  Location:    []  Iontophoresis with dexamethasone         Location: [] Take home patch   [] In clinic    []  Ice     []  heat  []  Ice massage  []  Laser   []  Anodyne Position:  Location:    []  Laser with stim  []  Other:  Position:  Location:   15 [x]  Vasopneumatic Device Pressure:       [x] lo [] med [] hi   Temperature: [x] lo [] med [] hi   [x] Skin assessment post-treatment:  [x]intact []redness- no adverse reaction    []redness  adverse reaction:     40 min Therapeutic Exercise:  [x] See flow sheet :   Rationale: increase ROM and increase strength to improve the patients ability to increase ease with ADls    With   [x] TE   [] TA   [] neuro   [] other: Patient Education: [x] Review HEP    [] Progressed/Changed HEP based on:   [] positioning   [] body mechanics   [] transfers   [] heat/ice application    [] other:      Other Objective/Functional Measures:   No difficulty with exercises   Cont to be challenged with eccentric step downs  Needed multiple cues with lunges for form     Pain Level (0-10 scale) post treatment: 0/10    ASSESSMENT/Changes in Function:   Pt is slowly progressing toward goals. Strength and AROM in the left knee cont to improve. Stair negotiation is improving    Patient will continue to benefit from skilled PT services to modify and progress therapeutic interventions, address functional mobility deficits, address ROM deficits, address strength deficits, analyze and cue movement patterns, analyze and modify body mechanics/ergonomics and instruct in home and community integration to attain remaining goals. []  See Plan of Care  [x]  See progress note/recertification  []  See Discharge Summary         Progress towards goals / Updated goals:  1. Patient will improve left knee AROM 0-110 degrees in order to increase ease of ambulation. Not met: pt. Continues to have decreased left knee flexion AROM (11/11/20)  2. Patient will improve left knee MMT to 5/5 in order to increase ease of ADLs.    3. Patient will demonstrate good understanding of updated HEP in order to continue to improve following D/C.     PLAN  []  Upgrade activities as tolerated     [x]  Continue plan of care  []  Update interventions per flow sheet       []  Discharge due to:_  []  Other:_      Josie Patel PTA 11/13/2020  6:28 AM    Future Appointments   Date Time Provider Dewayne Gamboa   11/13/2020  8:15 AM Mark Shah PTA [facility] [address]   11/16/2020  8:15 AM Magda Sánchez PT [facility] [address]   11/18/2020  9:00 AM Mark Shah PTA MMCPTPB [facility]   11/20/2020  9:00 AM Suzannesamantha Waters, PT MMCPTPB [facility]   11/23/2020  9:00 AM Suzannesamantha Waters, PT MMCPTPB [facility]   11/25/2020  7:30 AM Suzannesamantha Waters, PT MMCPTPB [facility]   11/27/2020  9:45 AM Suzannesamantha Waetrs, PT [id number] [facility]   11/30/2020  8:15 AM Suzannesamantha Waters, PT MMCPTPB [facility]   12/2/2020  8:15 AM Suzannesamantha Waters, PT MMCPTPB [facility]   12/4/2020  8:15 AM Laketon Terri, PTA MMCPTPB [facility]   12/7/2020  8:15 AM Suzanne Waters, PT ERBNBRM [facility]   12/9/2020  8:15 AM Suzannesamantha Waters, PT MMCPTPB [facility]   12/11/2020  8:15 AM Laketon Terri, PTA MMCPTPB [facility]

## 2020-11-16 ENCOUNTER — HOSPITAL ENCOUNTER (OUTPATIENT)
Dept: PHYSICAL THERAPY | Age: 70
Discharge: HOME | End: 2020-11-16
Payer: MEDICARE

## 2020-11-16 PROCEDURE — 97110 THERAPEUTIC EXERCISES: CPT

## 2020-11-16 NOTE — PROGRESS NOTES
PT DAILY TREATMENT NOTE     Patient Name: Joanna Obregon  Date:2020  : 1950  [x]  Patient  Verified  Payor: VA MEDICARE / Plan: [place] MEDICARE PART A & B / Product Type: Medicare /    In time: 8:17  Out time: 9:07  Total Treatment Time (min): 50  Visit #: 4 of 8    Medicare/BCBS Only   Total Timed Codes (min):  40 1:1 Treatment Time:  40       Treatment Area: Pain in left knee [M25.562]    SUBJECTIVE  Pain Level (0-10 scale): 0/10  Any medication changes, allergies to medications, adverse drug reactions, diagnosis change, or new procedure performed?: [x] No    [] Yes (see summary sheet for update)  Subjective functional status/changes:   [] No changes reported  Pt. Reports she is doing pretty good today. She reports she has been working on lunges at home.      OBJECTIVE    Modality rationale: decrease inflammation and decrease pain to improve the patients ability to increase ease of ADLs   Min Type Additional Details    [] Estim:  []Unatt       []IFC  []Premod                        []Other:  []w/ice   []w/heat  Position:  Location:    [] Estim: []Att    []TENS instruct  []NMES                    []Other:  []w/US   []w/ice   []w/heat  Position:  Location:    []  Traction: [] Cervical       []Lumbar                       [] Prone          []Supine                       []Intermittent   []Continuous Lbs:  [] before manual  [] after manual    []  Ultrasound: []Continuous   [] Pulsed                           []1MHz   []3MHz W/cm2:  Location:    []  Iontophoresis with dexamethasone         Location: [] Take home patch   [] In clinic   10 [x]  Ice     []  heat  []  Ice massage  []  Laser   []  Anodyne Position: supine  Location: left knee    []  Laser with stim  []  Other:  Position:  Location:    []  Vasopneumatic Device Pressure:       [] lo [] med [] hi   Temperature: [] lo [] med [] hi   [x] Skin assessment post-treatment:  [x]intact []redness- no adverse reaction    []redness  adverse reaction: 40 min Therapeutic Exercise:  [x] See flow sheet :   Rationale: increase ROM and increase strength to improve the patients ability to increase ease of ADLs          With   [x] TE   [] TA   [] neuro   [] other: Patient Education: [x] Review HEP    [] Progressed/Changed HEP based on:   [] positioning   [] body mechanics   [] transfers   [] heat/ice application    [] other:      Other Objective/Functional Measures:   Discussed D/C plans with pt. And she is agreeable to D/C next visit   Pt. Continues to be challenged with 60# resistance during single leg press  Unable to perform knee extensions on machine today  Left knee AROM: 2-105 degrees    Pain Level (0-10 scale) post treatment:  0/10    ASSESSMENT/Changes in Function:  Pt. Is progressing with physical therapy. She demonstrates improving strength and improved activity tolerance. She was able to stand up with grandchild at home without difficulty. She continues ot have decreased left knee AROM but it is slowly improving. Patient will continue to benefit from skilled PT services to modify and progress therapeutic interventions, address functional mobility deficits, address ROM deficits, address strength deficits, analyze and address soft tissue restrictions, analyze and cue movement patterns, analyze and modify body mechanics/ergonomics and assess and modify postural abnormalities to attain remaining goals. Progress towards goals / Updated goals:  1. Patient will improve left knee AROM 0-110 degrees in order to increase ease of ambulation.   Progressin-105 degrees (20)  2. Patient will improve left knee MMT to 5/5 in order to increase ease of ADLs.    3. Patient will demonstrate good understanding of updated HEP in order to continue to improve following D/C.   Met (20)    PLAN  []  Upgrade activities as tolerated     [x]  Continue plan of care  []  Update interventions per flow sheet       []  Discharge due to:_  []  Other:_ Nasreen Tipton, PT 11/16/2020  7:53 AM    Future Appointments   Date Time Provider Dewayne Gamboa   11/16/2020  8:15 AM Janbelinda Ashley, PT MMCPTPB [facility]   11/18/2020  9:00 AM Andra Foster, PTA MMCPTPB [facility]   11/20/2020  9:00 AM Janbelinda Ashley, PT MMCPTPB [facility]   11/23/2020  9:00 AM [facility]aden Ashley, [facility]   11/25/2020  7:30 AM Caden Ashley, PT MMCPTPB [facility]   11/27/2020  9:45 AM Caden Ashley, PT CBLSBUO [facility]   11/30/2020  8:15 AM Janeajacqueline Ashley, PT MMCPTPB [facility]   12/2/2020  8:15 AM Janbelinda Ashley, PT MMCPTPB [facility]   12/4/2020  8:15 AM Andra Foster, PTA LQBOCKT [facility]   12/7/2020  8:15 AM Caden Ashley, PT MMCPTPB [facility]   12/9/2020  8:15 AM Caden Ashley, PT MMCPTPB [facility]   12/11/2020  8:15 AM Andra Foster, PTA MMCPTPB [facility]

## 2020-11-18 ENCOUNTER — HOSPITAL ENCOUNTER (OUTPATIENT)
Dept: PHYSICAL THERAPY | Age: 70
Discharge: HOME | End: 2020-11-18
Payer: MEDICARE

## 2020-11-18 PROCEDURE — 97110 THERAPEUTIC EXERCISES: CPT

## 2020-11-18 NOTE — PROGRESS NOTES
PT DAILY TREATMENT NOTE     Patient Name: Caleb Fung  Date:2020  : 1950  [x]  Patient  Verified  Payor: [place] MEDICARE / Plan: [place] MEDICARE PART A & B / Product Type: Medicare /    In time:900  Out time:938  Total Treatment Time (min): 38  Visit #: 5 of 8    Medicare/BCBS Only   Total Timed Codes (min):  38 1:1 Treatment Time:  38       Treatment Area: Pain in left knee [M25.562]    SUBJECTIVE  Pain Level (0-10 scale): 0/10  Any medication changes, allergies to medications, adverse drug reactions, diagnosis change, or new procedure performed?: [x] No    [] Yes (see summary sheet for update)  Subjective functional status/changes:   [] No changes reported  Pt stated that she is doing pretty good today    OBJECTIVE    38 min Therapeutic Exercise:  [x] See flow sheet :   Rationale: increase ROM and increase strength to improve the patients ability to increase ease with ADLs    With   [x] TE   [] TA   [] neuro   [] other: Patient Education: [x] Review HEP    [] Progressed/Changed HEP based on:   [] positioning   [] body mechanics   [] transfers   [] heat/ice application    [] other:      Other Objective/Functional Measures:   Pt was issued BTB for HEP     Pain Level (0-10 scale) post treatment: 0/10    ASSESSMENT/Changes in Function:   []  See Plan of Care  []  See progress note/recertification  [x]  See Discharge Summary         Progress towards goals / Updated goals:  1. Patient will improve left knee AROM 0-110 degrees in order to increase ease of ambulation.   Progressin-105 degrees (20)  2. Patient will improve left knee MMT to 5/5 in order to increase ease of ADLs. Goal met. Strength in left knee is 5/5 for flex and ext  3.  Patient will demonstrate good understanding of updated HEP in order to continue to improve following D/C.   Met (20)    PLAN  []  Upgrade activities as tolerated     []  Continue plan of care  []  Update interventions per flow sheet       [x]  Discharge []  Other:_      Delma Lucas, PTA 11/18/2020  8:21 AM    Future Appointments   Date Time Provider Dewayne Gamboa   11/18/2020  9:00 AM Carlos Lesches, PTA [facility]   11/20/2020  9:00 AM Nick Sommers, PT [facility]   11/23/2020  9:00 AM Nick Sommers, PT [facility]   11/25/2020  7:30 AM Nick Sommers, PT [facility]   11/27/2020  9:45 AM Fay William, PT [id number]   11/30/2020  8:15 AM Nick Sommers, PT [facility]   12/2/2020  8:15 AM Nick Sommers, PT [facility]   12/4/2020  8:15 AM Carlos Lesches, PTA [facility]   12/7/2020  8:15 AM Nick Sommers, PT [facility]   12/9/2020  8:15 AM Nick Sommers, PT [facility]   12/11/2020  8:15 AM Carlos Lesches, PTA [facility]

## 2020-11-18 NOTE — PROGRESS NOTES
[facility] [place]  [address]  [phone] fax    Physical Therapy Discharge Summary    Patient name: Carrie Kincaid Start of Care: 2020   Referral So Singh : 1950               Medical Diagnosis: Pain in left knee [M25.562]  Payor: VA MEDICARE / Plan: VA MEDICARE PART A & B / Product Type: Medicare /  Onset Date: 20               Treatment Diagnosis:  Left knee pain   Prior Hospitalization: see medical history Provider#: [id number]   Medications: Verified on Patient summary List    Comorbidities: arthritis, depression   Prior Level of Function: Ind with ambulation, Ind with ADLs, lives alone    Visits from Start of Care:  23    Missed Visits: 0    Reporting Period : 20 to 20    Summary of Care:  Goal: Patient will improve left knee AROM 0-110 degrees in order to increase ease of ambulation. Status at last note/certification: 1-944 degrees  Status at discharge: not met    Goal: Patient will improve left knee MMT to 5/5 in order to increase ease of ADLs. Status at last note/certification: 4/5  Status at discharge: met    Goal: Patient will demonstrate good understanding of updated HEP in order to continue to improve following D/C. Status at last note/certification: n/a  Status at discharge: met    Pt. Has progressed well with physical therapy. She is ambulating without an AD and has no difficulty with transfers at home. She has good pain control at 0/10. Left knee MMT improved to 5/5 for flexion and extension. Left knee AROM improved 2-105 degrees. She demonstrates a good understanding of her HEP and was educated on continuing with this following D/C.      ASSESSMENT/RECOMMENDATIONS:  [x]Discontinue therapy: [x]Patient has reached or is progressing toward set goals      []Patient is non-compliant or has abdicated      []Due to lack of appreciable progress towards set goals    Seth Woodruff [place], PT 11/18/2020 3:51 PM

## 2020-11-20 ENCOUNTER — APPOINTMENT (OUTPATIENT)
Dept: PHYSICAL THERAPY | Age: 70
End: 2020-11-20
Payer: MEDICARE

## 2020-11-23 ENCOUNTER — APPOINTMENT (OUTPATIENT)
Dept: PHYSICAL THERAPY | Age: 70
End: 2020-11-23
Payer: MEDICARE

## 2020-11-25 ENCOUNTER — APPOINTMENT (OUTPATIENT)
Dept: PHYSICAL THERAPY | Age: 70
End: 2020-11-25
Payer: MEDICARE

## 2020-11-27 ENCOUNTER — APPOINTMENT (OUTPATIENT)
Dept: PHYSICAL THERAPY | Age: 70
End: 2020-11-27
Payer: MEDICARE

## 2020-11-30 ENCOUNTER — APPOINTMENT (OUTPATIENT)
Dept: PHYSICAL THERAPY | Age: 70
End: 2020-11-30
Payer: MEDICARE

## 2020-12-02 ENCOUNTER — APPOINTMENT (OUTPATIENT)
Dept: PHYSICAL THERAPY | Age: 70
End: 2020-12-02

## 2020-12-04 ENCOUNTER — APPOINTMENT (OUTPATIENT)
Dept: PHYSICAL THERAPY | Age: 70
End: 2020-12-04

## 2020-12-07 ENCOUNTER — APPOINTMENT (OUTPATIENT)
Dept: PHYSICAL THERAPY | Age: 70
End: 2020-12-07

## 2020-12-09 ENCOUNTER — APPOINTMENT (OUTPATIENT)
Dept: PHYSICAL THERAPY | Age: 70
End: 2020-12-09

## 2020-12-11 ENCOUNTER — APPOINTMENT (OUTPATIENT)
Dept: PHYSICAL THERAPY | Age: 70
End: 2020-12-11

## 2021-03-19 NOTE — PROGRESS NOTES
Initial Clinical Review    Admission: Date/Time/Statement:   Admission Orders (From admission, onward)     Ordered        03/18/21 1533  Inpatient Admission  Once                   Orders Placed This Encounter   Procedures    Inpatient Admission     Standing Status:   Standing     Number of Occurrences:   1     Order Specific Question:   Level of Care     Answer:   Med Surg [16]     Order Specific Question:   Estimated length of stay     Answer:   More than 2 Midnights     Order Specific Question:   Certification     Answer:   I certify that inpatient services are medically necessary for this patient for a duration of greater than two midnights  See H&P and MD Progress Notes for additional information about the patient's course of treatment  ED Arrival Information     Expected Arrival Acuity Means of Arrival Escorted By Service Admission Type    - 3/18/2021 11:41 Urgent Ambulance [facility] Urgent    Arrival Complaint    vomiting        Chief Complaint   Patient presents with    Vomiting     Pt reports vomiting since this morning  Pt states having generalized abdominal ache  Reports not feeling well since last night  Pt also report SOB  Pt is tachypneic  No CP  Assessment/Plan:  79 yo female with hx of CHF, HTN presents to ED from [facility] with N/V and dysphagia to solid foods  States she rec'd liquid KCL yesterday & had loose stools x 2 and started today with diffuse abd discomfort, N/V  She also endorses some weight gain as well as mild shortness of breath  ED Findings: tachypnea, tachycardia   K 5 6,  Cr 1 65,  T Bili 1 41, Trop 0 16,  Lactic acid 2 3,  NTproBNP 33,000  CXR shows Left basilar effusion with superimposed basilar opacity, possibly atelectasis or pneumonia   CT Soft tissue neck-  upper esophagus is moderately distended with air, possibly representing mild achalasia   Persistent nausea after 2 dose Zofran in ED - Reglan ordered    Admitted to Inpatient  With CHF,  CKD PT DAILY TREATMENT NOTE     Patient Name: Judge Varghese  Date:2020  : 1950  [x]  Patient  Verified  Payor: VA MEDICARE / Plan: VA MEDICARE PART A & B / Product Type: Medicare /    In time: 7:30  Out time: 8:20  Total Treatment Time (min): 50  Visit #: 2 of 8    Medicare/BCBS Only   Total Timed Codes (min):  40 1:1 Treatment Time:  40       Treatment Area: Pain in left knee [M25.562]    SUBJECTIVE  Pain Level (0-10 scale):  0/10  Any medication changes, allergies to medications, adverse drug reactions, diagnosis change, or new procedure performed?: [x] No    [] Yes (see summary sheet for update)  Subjective functional status/changes:   [] No changes reported  Pt. Reports she is doing pretty good. She reports she continues to have difficulty getting to the ground and picking up her grandchild.      OBJECTIVE    Modality rationale: decrease inflammation and decrease pain to improve the patients ability to increase ease of ambulation    Min Type Additional Details    [] Estim:  []Unatt       []IFC  []Premod                        []Other:  []w/ice   []w/heat  Position:  Location:    [] Estim: []Att    []TENS instruct  []NMES                    []Other:  []w/US   []w/ice   []w/heat  Position:  Location:    []  Traction: [] Cervical       []Lumbar                       [] Prone          []Supine                       []Intermittent   []Continuous Lbs:  [] before manual  [] after manual    []  Ultrasound: []Continuous   [] Pulsed                           []1MHz   []3MHz W/cm2:  Location:    []  Iontophoresis with dexamethasone         Location: [] Take home patch   [] In clinic   10 [x]  Ice     []  heat  []  Ice massage  []  Laser   []  Anodyne Position: supine  Location: left knee    []  Laser with stim  []  Other:  Position:  Location:    []  Vasopneumatic Device Pressure:       [] lo [] med [] hi   Temperature: [] lo [] med [] hi   [x] Skin assessment post-treatment:  [x]intact []redness- no adverse reaction    []redness  adverse reaction:     40 min Therapeutic Exercise:  [x] See flow sheet :   Rationale: increase ROM and increase strength to improve the patients ability to increase ease of ADLs          With   [x] TE   [] TA   [] neuro   [] other: Patient Education: [x] Review HEP    [] Progressed/Changed HEP based on:   [] positioning   [] body mechanics   [] transfers   [] heat/ice application    [] other:      Other Objective/Functional Measures:   Pt. Continues to be challenged with high radames step overs  She was educated on lunges for HEP  Pt. Tolerated increased resistance to single leg press well       Pain Level (0-10 scale) post treatment: 0/10    ASSESSMENT/Changes in Function:  Pt. Is progressing well with physical therapy. She demonstrates improving left knee strength during exercises and demonstrates improving ambulation. She continues to have difficulty with stand to floor transfers and continues to have decreased knee flexion mobility. Patient will continue to benefit from skilled PT services to modify and progress therapeutic interventions, address functional mobility deficits, address ROM deficits, address strength deficits, analyze and address soft tissue restrictions, analyze and cue movement patterns, analyze and modify body mechanics/ergonomics and assess and modify postural abnormalities to attain remaining goals. Progress towards goals / Updated goals:  1. Patient will improve left knee AROM 0-110 degrees in order to increase ease of ambulation. Not met: pt. Continues to have decreased left knee flexion AROM (11/11/20)  2. Patient will improve left knee MMT to 5/5 in order to increase ease of ADLs.    3. Patient will demonstrate good understanding of updated HEP in order to continue to improve following D/C.     PLAN  []  Upgrade activities as tolerated     [x]  Continue plan of care  []  Update interventions per flow sheet       []  Discharge due to:_  [] with Cr 1 65  Prior 1 52,  Hyperkalemia, Dysphagia  Plan is for IV diuresis, continue BB, monitor renal fx with diuresis, hold K supplement and repeat in am,Sips of liquids, Speech Eval, GI Consult, Cardio Consult  3/19: Increased work of breathing today  Speech deferred diet to GI, but GI currently deferring procedure at this time due to breathing - considering EGD  Per Cardio: Dyspnea - Acute on chronic heart failure  Unspecified cardiomyopathy, LVEF 40% in 2020  O2 sats stable  at 90-94%  on 2 L nasal cannula  She does have some increased work of breathing  Her lungs have rales at the bases, proBNP is significantly elevated at 33,000, & pleural effusion evident on CXR  Agree with IV Lasix, right now she is NPO so will keep her at IV 20 mg BID however once she is clear for oral intake will likely need to increase the dose  Place Purwick catheter to get accurate I/O in light of IV diuretics and abnormal renal function  Follow both  Heart rate in the 120-  likely 2/2  acute CHF, plan as above  Continue metoprolol succinate as long as blood pressure allows    Rapid Response Called 2/2 SOB and Hypotension   Pt  3/19        ED Triage Vitals   Temperature Pulse Respirations Blood Pressure SpO2   21 1201 21 1146 21 1146 21 1146 21 1146   (!) 97 4 °F (36 3 °C) 104 22 109/73 96 %      Temp Source Heart Rate Source Patient Position - Orthostatic VS BP Location FiO2 (%)   21 1201 21 1146 21 1146 21 1146 --   Oral Monitor Lying Right arm       Pain Score       21 1146       3          Wt Readings from Last 1 Encounters:   21 73 1 kg (161 lb 2 5 oz)     Additional Vital Signs:   21 0700  98 5 °F (36 9 °C)  116Abnormal   18  144/89  --  92 %  Nasal cannula Lying   21 0300  97 2 °F (36 2 °C)  96  20  110/70  85  90 %  None (Room air) Lying   21 1944  98 6 °F (37 °C)  84  18  116/72  --  92 %  -- Lying   21 1627  96 5 Other:_      Melba Marielos, PT 11/11/2020  7:10 AM    Future Appointments   Date Time Provider Dewayne Gamboa   11/11/2020  7:30 AM Chioma Gary, PT [facility]   11/13/2020  8:15 AM Tamara Junior, PTA [facility]   11/16/2020  8:15 AM Chioma Gary, PT [facility]   11/18/2020  9:00 AM Tamara Junior, PTA [facility]   11/20/2020  9:00 AM Chioma Gary, PT [facility]   11/23/2020  9:00 AM Chioma Gary, PT [facility]   11/25/2020  7:30 AM Chioma Gary, PT [facility]   11/27/2020  9:45 AM Chioma Gary, PT [facility]   11/30/2020  8:15 AM Chioma Gary, PT [facility]   12/2/2020  8:15 AM Chioma Gary, PT [facility]   12/4/2020  8:15 AM Tamara Junior, PTA [facility]   12/7/2020  8:15 AM Chioma Gary, PT [facility]   12/9/2020  8:15 AM Chioma Gary, PT [facility]   12/11/2020  8:15 AM Tamara Junior, PTA [facility] °F (35 8 °C)  100  20  113/71  --  95 %  None (Room air) Lying   03/18/21 1600  --  94  22  112/65  83  93 %  None (Room air) --   03/18/21 1522  --  95  22  113/70  --  94 %  None (Room air) Lying   03/18/21 1350  --  98  22  113/64  --  94 %  None (Room air) Lying       Pertinent Labs/Diagnostic Test Results:   Results from last 7 days   Lab Units 03/18/21  1151   WBC Thousand/uL 6 08   HEMOGLOBIN g/dL 12 2   HEMATOCRIT % 38 0   PLATELETS Thousands/uL 189   NEUTROS ABS Thousands/µL 4 52     Results from last 7 days   Lab Units 03/18/21  1151   SODIUM mmol/L 139   POTASSIUM mmol/L 5 6*   CHLORIDE mmol/L 105   CO2 mmol/L 23   ANION GAP mmol/L 11   BUN mg/dL 34*   CREATININE mg/dL 1 65*   EGFR ml/min/1 73sq m 27   CALCIUM mg/dL 9 4     Results from last 7 days   Lab Units 03/18/21  1151   AST U/L 20   ALT U/L 25   ALK PHOS U/L 109   TOTAL PROTEIN g/dL 7 9   ALBUMIN g/dL 4 1   TOTAL BILIRUBIN mg/dL 1 41*     Results from last 7 days   Lab Units 03/18/21  1158   POC GLUCOSE mg/dl 132     Results from last 7 days   Lab Units 03/18/21  1151   GLUCOSE RANDOM mg/dL 134     Results from last 7 days   Lab Units 03/18/21  1530 03/18/21  1151   TROPONIN I ng/mL 0 14* 0 16*     Results from last 7 days   Lab Units 03/18/21  1151   PROTIME seconds 15 9*   INR  1 30*   PTT seconds 33       Results from last 7 days   Lab Units 03/18/21  1151   PROCALCITONIN ng/ml <0 05     Results from last 7 days   Lab Units 03/18/21  1833 03/18/21  1357 03/18/21  1151   LACTIC ACID mmol/L 1 9 2 1* 2 3*     Results from last 7 days   Lab Units 03/18/21  1151   NT-PRO BNP pg/mL 33,000*     Results from last 7 days   Lab Units 03/18/21  1151   LIPASE u/L 103     Results from last 7 days   Lab Units 03/18/21  1524   CLARITY UA  Clear   COLOR UA  Yellow   SPEC GRAV UA  1 020   PH UA  5 5   GLUCOSE UA mg/dl Negative   KETONES UA mg/dl Negative   BLOOD UA  Trace-Intact*   PROTEIN UA mg/dl Negative   NITRITE UA  Negative   BILIRUBIN UA  Negative UROBILINOGEN UA E U /dl 0 2   LEUKOCYTES UA  Trace*   WBC UA /hpf 0-1*   RBC UA /hpf None Seen   BACTERIA UA /hpf Occasional   EPITHELIAL CELLS WET PREP /hpf Occasional     Results from last 7 days   Lab Units 03/18/21  1243 03/18/21  1151   BLOOD CULTURE  Received in Microbiology Lab  Culture in Progress  Received in Microbiology Lab  Culture in Progress  3/18 CXR: Left basilar effusion with superimposed basilar opacity, possibly atelectasis or pneumonia  No congestive failure  3/18 CT Head:  No acute intracranial abnormality    Chronic cerebral white matter microangiopathic changes  3/18 CT Soft tissue Neck: The upper esophagus is moderately distended with air, possibly representing mild achalasia   Consider follow-up nonemergent swallowing study  No pathologic adenopathy or mass within the neck  3/18 CT A&P: 1   No acute findings to account for the patient's symptoms within the limits of unenhanced technique  2   Infrarenal abdominal aortic aneurysm measuring 3 8 cm  3   Age-indeterminate compression fractures which are severe at L1 with bony retropulsion, and moderate at L3      4   Small to moderate bilateral pleural effusions  5   Nonspecific right lower lobe groundglass opacities may be due to atelectasis or pneumonia in the appropriate clinical setting  6   Cardiomegaly  3/18 EKG:  Sinus tachycardia with Premature supraventricular complexes   Nonspecific ST-t wave changes     ED Treatment:   Medication Administration from 03/18/2021 1141 to 03/18/2021 1726       Date/Time Order Dose Route Action     03/18/2021 1156 ondansetron (ZOFRAN) injection 4 mg 4 mg Intravenous Given     03/18/2021 1330 furosemide (LASIX) injection 40 mg 40 mg Intravenous Given     03/18/2021 1334 piperacillin-tazobactam (ZOSYN) 2 25 g in sodium chloride 0 9 % 50 mL IVPB 2 25 g Intravenous New Bag     03/18/2021 1326 ondansetron (ZOFRAN) injection 4 mg 4 mg Intravenous Given     03/18/2021 1519 metoclopramide (REGLAN) injection 10 mg 10 mg Intravenous Given     03/18/2021 1602 aspirin rectal suppository 300 mg 300 mg Rectal Given        Past Medical History:   Diagnosis Date    Acute embolism and thombos unsp deep veins of r low extrem (HCC)     GERD (gastroesophageal reflux disease)     Murmur 8/27/2020    Pneumonia due to COVID-19 virus 1/11/2021    Right humeral fracture 7/20/2018     Present on Admission:   Acute on chronic systolic congestive heart failure (HCC)   (Resolved) CKD (chronic kidney disease)   Anemia   GERD (gastroesophageal reflux disease)   Hyperkalemia      Admitting Diagnosis: Hyperkalemia [E87 5]  Lactic acidosis [E87 2]  Vomiting [R11 10]  AAA (abdominal aortic aneurysm) ([facility]) [I71 4]  Nausea and vomiting [R11 2]  Elevated troponin [R77 8]  Bilateral pleural effusion [J90]  Dysphagia [R13 10]  Acute on chronic diastolic CHF (congestive heart failure) ([facility]) [I50 33]  Age/Sex: 80 y o  female     Admission Orders:  Scheduled Medications:  furosemide, 20 mg, Intravenous, BID (diuretic)  metoprolol succinate, 12 5 mg, Oral, Daily  oxybutynin, 5 mg, Oral, Daily  pantoprazole, 40 mg, Oral, Daily Before Breakfast  senna, 1 tablet, Oral, HS    Continuous IV Infusions:  dextrose 5 % and sodium chloride 0 9 %, 50 mL/hr, Intravenous, Continuous    PRN Meds:  HYDROmorphone, 0 2 mg, Intravenous, Q6H PRN  X 1 3/19  metoclopramide, 10 mg, Intravenous, Q6H PRN  sodium chloride (PF), 3 mL, Intravenous, Q1H PRN    TELEMETRY  NPO, sips with meds  Continuous pulse ox  IP CONSULT TO GASTROENTEROLOGY  IP CONSULT TO CARDIOLOGY    Network Utilization Review Department  ATTENTION: Please call with any questions or concerns to [phone] and carefully listen to the prompts so that you are directed to the right person   All voicemails are confidential   Tomasa List all requests for admission clinical reviews, approved or denied determinations and any other requests to dedicated fax number below belonging to the campus where the patient is receiving treatment   List of dedicated fax numbers for the Facilities:  [address] [place] [address] DENIALS (Administrative/Medical Necessity) [phone]   [address]  (Maternity/NICU/Pediatrics) [phone] [address] Marino Chou 2169 ([facility]) [address] [place] [address] Felice Cobb [address] [place], [place] [address] [phone]

## 2021-06-07 NOTE — PERIOP NOTES
Received patient from, OR nurse & anesthesia provide, with patient identification completed, review of procedure, and intraoperative course. 37.1

## 2021-08-03 PROBLEM — M47.816 FACET HYPERTROPHY OF LUMBAR REGION: Status: RESOLVED | Noted: 2021-08-03 | Resolved: 2021-08-03

## 2021-09-23 ENCOUNTER — TRANSCRIBE ORDER (OUTPATIENT)
Dept: SCHEDULING | Age: 71
End: 2021-09-23

## 2021-09-23 DIAGNOSIS — Z12.31 VISIT FOR SCREENING MAMMOGRAM: Primary | ICD-10-CM

## 2021-10-04 ENCOUNTER — HOSPITAL ENCOUNTER (OUTPATIENT)
Dept: MAMMOGRAPHY | Age: 71
Discharge: HOME | End: 2021-10-04
Attending: PHYSICIAN ASSISTANT
Payer: MEDICARE

## 2021-10-04 DIAGNOSIS — Z12.31 VISIT FOR SCREENING MAMMOGRAM: ICD-10-CM

## 2021-10-04 PROCEDURE — 77063 BREAST TOMOSYNTHESIS BI: CPT

## 2022-02-07 ENCOUNTER — TRANSCRIBE ORDER (OUTPATIENT)
Dept: SCHEDULING | Age: 72
End: 2022-02-07

## 2022-02-07 DIAGNOSIS — M81.0 OSTEOPOROSIS: Primary | ICD-10-CM

## 2022-02-21 ENCOUNTER — HOSPITAL ENCOUNTER (OUTPATIENT)
Dept: BONE DENSITY | Age: 72
Discharge: HOME | End: 2022-02-21
Attending: PHYSICIAN ASSISTANT
Payer: MEDICARE

## 2022-02-21 DIAGNOSIS — M81.0 OSTEOPOROSIS: ICD-10-CM

## 2022-02-21 PROCEDURE — 77080 DXA BONE DENSITY AXIAL: CPT

## 2022-03-18 PROBLEM — M17.12 LOCALIZED OSTEOARTHRITIS OF LEFT KNEE: Status: ACTIVE | Noted: 2020-08-27

## 2022-03-19 PROBLEM — M17.12 PRIMARY LOCALIZED OSTEOARTHRITIS OF LEFT KNEE: Status: ACTIVE | Noted: 2020-08-17

## 2022-04-20 ENCOUNTER — TRANSCRIBE ORDER (OUTPATIENT)
Dept: SCHEDULING | Age: 72
End: 2022-04-20

## 2022-04-20 DIAGNOSIS — I63.512 CEREB INFRC D/T UNSP OCCLS OR STENOS OF LEFT MID CEREB ART (HCC): Primary | ICD-10-CM

## 2022-05-11 ENCOUNTER — HOSPITAL ENCOUNTER (OUTPATIENT)
Dept: VASCULAR SURGERY | Age: 72
Discharge: HOME | End: 2022-05-11
Attending: PSYCHIATRY & NEUROLOGY
Payer: MEDICARE

## 2022-05-11 ENCOUNTER — HOSPITAL ENCOUNTER (OUTPATIENT)
Age: 72
Discharge: HOME | End: 2022-05-11
Attending: PSYCHIATRY & NEUROLOGY
Payer: MEDICARE

## 2022-05-11 DIAGNOSIS — I63.512 CEREB INFRC D/T UNSP OCCLS OR STENOS OF LEFT MID CEREB ART (HCC): ICD-10-CM

## 2022-05-11 LAB
LEFT BULB EDV: 21.5 CM/S
LEFT BULB PSV: 78.4 CM/S
LEFT CCA DIST DIAS: 26.7 CM/S
LEFT CCA DIST SYS: 90.1 CM/S
LEFT CCA MID DIAS: 25.2 CM/S
LEFT CCA MID SYS: 91.42 CM/S
LEFT CCA PROX DIAS: 27.3 CM/S
LEFT CCA PROX SYS: 116.1 CM/S
LEFT ECA DIAS: 12.42 CM/S
LEFT ECA SYS: 66.8 CM/S
LEFT ICA DIST DIAS: 36.6 CM/S
LEFT ICA DIST SYS: 97.5 CM/S
LEFT ICA MID DIAS: 31.1 CM/S
LEFT ICA MID SYS: 72.3 CM/S
LEFT ICA PROX DIAS: 25 CM/S
LEFT ICA PROX SYS: 56.4 CM/S
LEFT ICA/CCA SYS: 1.08 NO UNITS
LEFT VERTEBRAL DIAS: 9.47 CM/S
LEFT VERTEBRAL SYS: 40.1 CM/S
RIGHT BULB EDV: 13 CM/S
RIGHT BULB PSV: 58.6 CM/S
RIGHT CCA DIST DIAS: 18 CM/S
RIGHT CCA DIST SYS: 57.9 CM/S
RIGHT CCA MID DIAS: 17.21 CM/S
RIGHT CCA MID SYS: 66.39 CM/S
RIGHT CCA PROX DIAS: 17.2 CM/S
RIGHT CCA PROX SYS: 79.3 CM/S
RIGHT ECA DIAS: 11.35 CM/S
RIGHT ECA SYS: 73.1 CM/S
RIGHT ICA DIST DIAS: 21.3 CM/S
RIGHT ICA DIST SYS: 59.8 CM/S
RIGHT ICA MID DIAS: 0 CM/S
RIGHT ICA MID SYS: 67.5 CM/S
RIGHT ICA PROX DIAS: 20.2 CM/S
RIGHT ICA PROX SYS: 75.3 CM/S
RIGHT ICA/CCA SYS: 1.3 NO UNITS
RIGHT VERTEBRAL DIAS: 10.58 CM/S
RIGHT VERTEBRAL SYS: 55.8 CM/S
VAS LEFT SUBCLAVIAN PROX EDV: 0 CM/S
VAS LEFT SUBCLAVIAN PROX PSV: 143.1 CM/S
VAS RIGHT SUBCLAVIAN PROX EDV: 0 CM/S
VAS RIGHT SUBCLAVIAN PROX PSV: 80.6 CM/S

## 2022-05-11 PROCEDURE — 93880 EXTRACRANIAL BILAT STUDY: CPT

## 2022-05-11 PROCEDURE — 70551 MRI BRAIN STEM W/O DYE: CPT

## 2022-06-16 ENCOUNTER — OFFICE VISIT (OUTPATIENT)
Dept: CARDIOLOGY CLINIC | Age: 72
End: 2022-06-16
Payer: MEDICARE

## 2022-06-16 VITALS
WEIGHT: 136 LBS | SYSTOLIC BLOOD PRESSURE: 105 MMHG | DIASTOLIC BLOOD PRESSURE: 57 MMHG | BODY MASS INDEX: 23.22 KG/M2 | HEART RATE: 87 BPM | OXYGEN SATURATION: 97 % | HEIGHT: 64 IN

## 2022-06-16 DIAGNOSIS — R55 SYNCOPE, UNSPECIFIED SYNCOPE TYPE: Primary | ICD-10-CM

## 2022-06-16 DIAGNOSIS — F32.A DEPRESSION, UNSPECIFIED DEPRESSION TYPE: ICD-10-CM

## 2022-06-16 DIAGNOSIS — E78.5 HYPERLIPIDEMIA, UNSPECIFIED HYPERLIPIDEMIA TYPE: ICD-10-CM

## 2022-06-16 PROCEDURE — 1090F PRES/ABSN URINE INCON ASSESS: CPT | Performed by: INTERNAL MEDICINE

## 2022-06-16 PROCEDURE — 1123F ACP DISCUSS/DSCN MKR DOCD: CPT | Performed by: INTERNAL MEDICINE

## 2022-06-16 PROCEDURE — 3017F COLORECTAL CA SCREEN DOC REV: CPT | Performed by: INTERNAL MEDICINE

## 2022-06-16 PROCEDURE — 93000 ELECTROCARDIOGRAM COMPLETE: CPT | Performed by: INTERNAL MEDICINE

## 2022-06-16 PROCEDURE — G8399 PT W/DXA RESULTS DOCUMENT: HCPCS | Performed by: INTERNAL MEDICINE

## 2022-06-16 PROCEDURE — 99204 OFFICE O/P NEW MOD 45 MIN: CPT | Performed by: INTERNAL MEDICINE

## 2022-06-16 PROCEDURE — G8420 CALC BMI NORM PARAMETERS: HCPCS | Performed by: INTERNAL MEDICINE

## 2022-06-16 PROCEDURE — 1101F PT FALLS ASSESS-DOCD LE1/YR: CPT | Performed by: INTERNAL MEDICINE

## 2022-06-16 PROCEDURE — G8427 DOCREV CUR MEDS BY ELIG CLIN: HCPCS | Performed by: INTERNAL MEDICINE

## 2022-06-16 PROCEDURE — G8510 SCR DEP NEG, NO PLAN REQD: HCPCS | Performed by: INTERNAL MEDICINE

## 2022-06-16 PROCEDURE — G8536 NO DOC ELDER MAL SCRN: HCPCS | Performed by: INTERNAL MEDICINE

## 2022-06-16 PROCEDURE — G9899 SCRN MAM PERF RSLTS DOC: HCPCS | Performed by: INTERNAL MEDICINE

## 2022-06-16 NOTE — PROGRESS NOTES
HISTORY OF PRESENT ILLNESS  Gregorio Elder is a 70 y.o. female. 2022  Patient seen today for new patient evaluation. She is referred here for evaluation of dizziness and presyncope. Patient has been having episode of dizziness that started sometime in January not progressively worse. This happens very frequently. Happens on position changes like sitting or standing or walking. She also feels like transiently she has blackout spells but does not fall she holds something and slowly recovers. Denies any palpitation, chest pain or other associated symptoms no shortness of breath or edema. No tingling or numbness. No prior history of cardiac issues. Patient has been on Prozac and trazodone for some time and for about a year she has been started on Wellbutrin. History of hyperlipidemia on combination therapy      Review of Systems   Constitutional: Negative for chills and fever. HENT: Negative for nosebleeds. Eyes: Negative for blurred vision and double vision. Respiratory: Negative for cough, hemoptysis, sputum production, shortness of breath and wheezing. Cardiovascular: Negative for chest pain, palpitations, orthopnea, claudication, leg swelling and PND. Gastrointestinal: Negative for abdominal pain, heartburn, nausea and vomiting. Musculoskeletal: Negative for myalgias. Skin: Negative for rash. Neurological: Negative for dizziness, weakness and headaches. Endo/Heme/Allergies: Does not bruise/bleed easily.      Family History   Problem Relation Age of Onset    Heart Failure Mother          at age 80 of congestive heart failure    Heart Disease Mother     Cancer Father          at age 54 of lung cancer; smoker and asbestos exposure       Past Medical History:   Diagnosis Date    Anxiety disorder     Arthritis     Basal joint arthritis, left thumb    GERD (gastroesophageal reflux disease)     Headache(784.0)     Hearing reduced     Left knee pain     Lower back pain July 2010    Menopause     Age 39    Osteoarthritis of left knee     Pain of left thumb     Primary localized osteoarthritis of left knee 8/17/2020    Psychiatric disorder     depression    Sacroiliitis ([address] 9/15/2010    SI (sacroiliac) joint dysfunction     Sinus trouble     Ulcer 2006    polyp, positive heme occult stools, denies ulcer       Past Surgical History:   Procedure Laterality Date    HX DILATION AND CURETTAGE      HX ORTHOPAEDIC  9/15/2010    Left SI joint injection 30mg Celestone and 4 cc Lidocaine    HX TUBAL LIGATION         Social History     Tobacco Use    Smoking status: Never Smoker    Smokeless tobacco: Never Used    Tobacco comment:  SMOKED    Substance Use Topics    Alcohol use: No     Alcohol/week: 0.0 standard drinks       Allergies   Allergen Reactions    Topamax [Topiramate] Other (comments)     Whole body tingling  Blurry vision       Prior to Admission medications    Medication Sig Start Date End Date Taking? Authorizing Provider   omega-3s-dha-epa-fish oil (Fish Oil) 350-600 mg cap Take  by mouth daily. Yes Provider, Historical   ascorbic acid-multivit-min-MSM 1,000-1,000 mg pwep Take  by mouth. Yes Provider, Historical   buPROPion SR (WELLBUTRIN SR) 150 mg SR tablet Take 150 mg by mouth daily. Indications: anxiousness associated with depression   Yes Provider, Historical   rosuvastatin (Crestor) 10 mg tablet Take 10 mg by mouth nightly. Yes Provider, Historical   FLUoxetine (PROZAC) 20 mg capsule Take 60 mg by mouth daily. Indications: anxiousness associated with depression   Yes Provider, Historical   traZODone (DESYREL) 100 mg tablet Take 100 mg by mouth nightly. Yes Provider, Historical   cyanocobalamin (VITAMIN B-12) 500 mcg tablet Take 500 mcg by mouth daily. Yes Provider, Historical   calcium 600 mg Cap Take  by mouth daily. Yes Provider, Historical   OMEPRAZOLE MAGNESIUM (PRILOSEC OTC PO) Take 40 mg by mouth daily.    Yes Provider, Historical   ALPRAZolam (XANAX) 0.5 mg tablet Take  by mouth nightly as needed. Patient not taking: Reported on 6/16/2022    Provider, Historical         Visit Vitals  BP (!) 105/57 (BP 1 Location: Left upper arm, BP Patient Position: Sitting, BP Cuff Size: Adult)   Pulse 87   Ht 5' 4\" (1.626 m)   Wt 61.7 kg (136 lb)   SpO2 97%   BMI 23.34 kg/m²         Physical Exam  Constitutional:       Appearance: She is well-developed. HENT:      Head: Normocephalic and atraumatic. Eyes:      Conjunctiva/sclera: Conjunctivae normal.   Neck:      Thyroid: No thyromegaly. Vascular: No JVD. Trachea: No tracheal deviation. Cardiovascular:      Rate and Rhythm: Normal rate and regular rhythm. Chest Wall: PMI is not displaced. Pulses: No decreased pulses. Heart sounds: No murmur heard. No gallop. No S3 sounds. Pulmonary:      Effort: No respiratory distress. Breath sounds: No wheezing or rales. Chest:      Chest wall: No tenderness. Abdominal:      Palpations: Abdomen is soft. Tenderness: There is no abdominal tenderness. Musculoskeletal:      Cervical back: Neck supple. Skin:     General: Skin is warm. Neurological:      Mental Status: She is alert and oriented to person, place, and time. Ms. Pamela Acosta has a reminder for a \"due or due soon\" health maintenance. I have asked that she contact her primary care provider for follow-up on this health maintenance. No flowsheet data found. I have personally reviewed patient's records available from hospital and other providers and incorporated findings in patient care. Provider notes, labs, EKG, vascular study, MRI    IMPRESSION 5/2022        1. Brain looks normal for age. 2.  4 cm right suboccipital lipoma. I have personally reviewed patients ekg done at other facility.   8/2020-within normal limits    Interpretation Summary 5/2022    Carotid Duplex:     1-49% Stenosis of the Internal Carotid Arteries  Antegrade flow of the Vertebral Arteries  Normal flow of the Subclavian Arteries        Assessment         ICD-10-CM ICD-9-CM    1. Syncope, unspecified syncope type  R55 780.2 AMB POC EKG ROUTINE W/ 12 LEADS, INTER & REP      ECHO ADULT COMPLETE      TILT TABLE EVAL W/WO DRUG      CT HEART W/O CONT WITH CALCIUM    Dizziness and lightheadedness. Some episodes where she feels like fainting. Could be orthostatic hypotension rule out arrhythmia rule out heart issue   2. Hyperlipidemia, unspecified hyperlipidemia type  E78.5 272.4 ECHO ADULT COMPLETE      TILT TABLE EVAL W/WO DRUG      CT HEART W/O CONT WITH CALCIUM    Continue treatment monitor   3. Depression, unspecified depression type  F32. A 311 CT HEART W/O CONT WITH CALCIUM    On multiple medication that could be contributing to her symptoms. QTC is normal.  Clinically does not appear to be arrhythmia     6/16/2022  Seen for new onset progressive dizziness. Could be related to medication. Set up tilt table to evaluate for orthostatic changes. Does not appear arrhythmic in nature get echo for LV function get CT coronary calcium score and decide on statin intensity and aspirin. Continue with hydration and support stocking  Medications Discontinued During This Encounter   Medication Reason    ferrous sulfate (IRON PO) Not A Current Medication    acetaminophen (TYLENOL) 500 mg tablet Not A Current Medication       Orders Placed This Encounter    CT HEART W/O CONT WITH CALCIUM     Standing Status:   Future     Standing Expiration Date:   7/16/2023    AMB POC EKG ROUTINE W/ 12 LEADS, INTER & REP     Order Specific Question:   Reason for Exam:     Answer:   HTN    ECHO ADULT COMPLETE     Standing Status:   Future     Standing Expiration Date:   6/16/2023     Order Specific Question:   Contrast Enhancement (Bubble Study, Definity, Optison) may be used if criteria listed in established evidence-based protocol has been identified.      Answer:   Yes       Follow-up and Dispositions    · Return for F/u after tests.

## 2022-06-20 ENCOUNTER — TELEPHONE (OUTPATIENT)
Dept: CARDIOTHORACIC SURGERY | Age: 72
End: 2022-06-20

## 2022-06-20 ENCOUNTER — TRANSCRIBE ORDER (OUTPATIENT)
Dept: CARDIAC CATH/INVASIVE PROCEDURES | Age: 72
End: 2022-06-20

## 2022-06-20 DIAGNOSIS — R55 SYNCOPE: Primary | ICD-10-CM

## 2022-06-20 NOTE — TELEPHONE ENCOUNTER
----- Message from Tayla Mckeon sent at 6/16/2022  4:20 PM EDT -----  Regarding: tilt table  Patient need a tilt table scheduled per Yakelin Lassiter

## 2022-06-20 NOTE — TELEPHONE ENCOUNTER
Instructions: You are scheduled to have a Tilt Table at [facility] on 8/4/22 . Please report to the [address] [place] [address] located on the back side of the [facility] at [address] [place] [address]. Please arrive at 6:45 am.      1. NOTHING to eat or drink after midnight the night before the test.    2.   Your physician will advise you on what medications you can take the morning of the test with just a sip of water. 3. You will not be able to drive home after the test, so please have some one take you and pick you up; if you have to drive yourself please expect to wait an extra hour after the test to make sure  that you are ok to drive yourself. If yo have any questions or concerns please call the office at [phone].

## 2022-07-01 ENCOUNTER — HOSPITAL ENCOUNTER (OUTPATIENT)
Dept: CT IMAGING | Age: 72
Discharge: HOME | End: 2022-07-01
Attending: INTERNAL MEDICINE
Payer: SELF-PAY

## 2022-07-01 DIAGNOSIS — E78.5 HYPERLIPIDEMIA, UNSPECIFIED HYPERLIPIDEMIA TYPE: ICD-10-CM

## 2022-07-01 DIAGNOSIS — R55 SYNCOPE, UNSPECIFIED SYNCOPE TYPE: ICD-10-CM

## 2022-07-01 DIAGNOSIS — F32.A DEPRESSION, UNSPECIFIED DEPRESSION TYPE: ICD-10-CM

## 2022-07-01 PROCEDURE — 75571 CT HRT W/O DYE W/CA TEST: CPT

## 2022-07-06 ENCOUNTER — TELEPHONE (OUTPATIENT)
Dept: CARDIOLOGY CLINIC | Age: 72
End: 2022-07-06

## 2022-07-06 ENCOUNTER — HOSPITAL ENCOUNTER (OUTPATIENT)
Dept: LAB | Age: 72
Discharge: HOME | End: 2022-07-06
Payer: MEDICARE

## 2022-07-06 DIAGNOSIS — E78.5 HYPERLIPIDEMIA, UNSPECIFIED HYPERLIPIDEMIA TYPE: ICD-10-CM

## 2022-07-06 DIAGNOSIS — E78.5 HYPERLIPIDEMIA, UNSPECIFIED HYPERLIPIDEMIA TYPE: Primary | ICD-10-CM

## 2022-07-06 LAB
CHOLEST SERPL-MCNC: 161 MG/DL
HDLC SERPL-MCNC: 88 MG/DL (ref 40–60)
HDLC SERPL: 1.8 {RATIO} (ref 0–5)
LDLC SERPL CALC-MCNC: 55.8 MG/DL (ref 0–100)
LIPID PROFILE,FLP: ABNORMAL
TRIGL SERPL-MCNC: 86 MG/DL (ref ?–150)
VLDLC SERPL CALC-MCNC: 17.2 MG/DL

## 2022-07-06 PROCEDURE — 36415 COLL VENOUS BLD VENIPUNCTURE: CPT

## 2022-07-06 PROCEDURE — 80061 LIPID PANEL: CPT

## 2022-07-06 NOTE — TELEPHONE ENCOUNTER
----- Message from Jayson Heard MD sent at 7/6/2022 12:39 PM EDT -----  Lab/test  reviewed  No significant abnormality

## 2022-07-06 NOTE — TELEPHONE ENCOUNTER
Spoke with patient regarding test per Dr. Rasheeda Conde. Test reviewed. Mild coronary calcification. Get Lipid profile decide on adjustment of Rosuvastatin. She voices understanding and acceptance of this advice and will call back if any further questions or concerns.

## 2022-07-06 NOTE — TELEPHONE ENCOUNTER
Spoke with patient regarding lab/test per Dr. Shannon Stinson. Lab reviewed. No significant abnormality. She voices understanding and acceptance of this advice and will call back if any further questions or concerns.

## 2022-07-06 NOTE — TELEPHONE ENCOUNTER
----- Message from Saida Peterson MD sent at 7/6/2022  7:29 AM EDT -----  Mild coronary calcification. Get lipid profile decide on adjustment of rosuvastatin.

## 2022-08-04 ENCOUNTER — HOSPITAL ENCOUNTER (OUTPATIENT)
Dept: CARDIAC CATH/INVASIVE PROCEDURES | Age: 72
Discharge: HOME | End: 2022-08-04
Attending: INTERNAL MEDICINE | Admitting: INTERNAL MEDICINE
Payer: MEDICARE

## 2022-08-04 VITALS
HEIGHT: 64 IN | RESPIRATION RATE: 22 BRPM | SYSTOLIC BLOOD PRESSURE: 98 MMHG | WEIGHT: 135 LBS | DIASTOLIC BLOOD PRESSURE: 51 MMHG | OXYGEN SATURATION: 100 % | BODY MASS INDEX: 23.05 KG/M2 | HEART RATE: 75 BPM

## 2022-08-04 DIAGNOSIS — R55 SYNCOPE: ICD-10-CM

## 2022-08-04 PROCEDURE — 93660 TILT TABLE EVALUATION: CPT | Performed by: INTERNAL MEDICINE

## 2022-08-04 PROCEDURE — 93660 TILT TABLE EVALUATION: CPT

## 2022-08-04 PROCEDURE — 74011250637 HC RX REV CODE- 250/637: Performed by: PHYSICIAN ASSISTANT

## 2022-08-04 PROCEDURE — 74011250636 HC RX REV CODE- 250/636: Performed by: PHYSICIAN ASSISTANT

## 2022-08-04 RX ORDER — SODIUM CHLORIDE 9 MG/ML
500 INJECTION, SOLUTION INTRAVENOUS ONCE
Status: COMPLETED | OUTPATIENT
Start: 2022-08-04 | End: 2022-08-04

## 2022-08-04 RX ORDER — NITROGLYCERIN 400 UG/1
1 SPRAY ORAL ONCE
Status: COMPLETED | OUTPATIENT
Start: 2022-08-04 | End: 2022-08-04

## 2022-08-04 RX ADMIN — SODIUM CHLORIDE 500 ML: 9 INJECTION, SOLUTION INTRAVENOUS at 07:45

## 2022-08-04 RX ADMIN — NITROGLYCERIN 1 SPRAY: 400 SPRAY ORAL at 08:51

## 2022-08-04 NOTE — PROGRESS NOTES
Pt c/o feeling dizzy/light-headed prior to test, with continued but not worsening symptoms throughout. Shortly after administration of nitroglycerin spray pt's BP dropped precipitously and she lost consciousness. Tilt table returned to supine position, IVF bolus given x500cc. Pt regained consciousness quickly but c/o feeling \"yucky\" from the nitroglycerin. Pt kept in supine position until she felt a return to baseline status. VS monitored for another 15 minutes with no irregularities and pt was d/c'd to her family member for transport home. Sees Herrera Rosenberg for GYN exam every 2 years. She manages her osteoporosis.   On Coumadin per med list.  Reason and who prescribed not listed.

## 2022-08-04 NOTE — ROUTINE PROCESS
Patient ambulated from waiting area without difficulty, placed on monitor 10. A&O, no c/o. ID, NPO status, allergies, home meds verified. PIV x1 inserted. Consent signed, fluid bolus started.

## 2022-08-05 LAB
GHC CRD HEART RATE: 81 BPM
GHC CRD INITIAL SUPINE RHYTHM: NORMAL
GHC CRD INITIAL TILT BLOOD PRESSURE: NORMAL MMHG
GHC CRD INITIAL TILT HEART RATE: 85 BPM
GHC CRD MAX BLOOD PRESSURE TECH FORM: NORMAL BPM
GHC CRD MAX BLOOD PRESSURE: NORMAL MMHG
GHC CRD MAX BP HEART RATE: 85 BPM
GHC CRD MAX BP MINUTES: 0
GHC CRD MAX BP RHYTHM: NORMAL
GHC CRD MAX HEART RATE: 104 BPM
GHC CRD MAX HR BLOOD PRESSURE: NORMAL MMHG
GHC CRD MAX HR MINUTES: 35
GHC CRD MAX HR RHYTHM: NORMAL
GHC CRD MIN HEART RATE: 85 BPM
GHC CRD MINIMUM BLOOD PRESSURE: NORMAL
GHC CRD MINIMUM BP HEART RATE 1: NORMAL
GHC CRD MINIMUM BP HEART RATE: 104 BPM
GHC CRD MINIMUM BP MINUTES: 35
GHC CRD MINIMUM BP RHYTHM: NORMAL
GHC CRD MINIMUM HEART RATE: NORMAL BPM
GHC CRD MINIMUM HR MINUTES: 0
GHC CRD TILT RHYTHM: NORMAL

## 2022-08-24 ENCOUNTER — OFFICE VISIT (OUTPATIENT)
Dept: CARDIOLOGY CLINIC | Age: 72
End: 2022-08-24
Payer: MEDICARE

## 2022-08-24 VITALS
HEIGHT: 64 IN | DIASTOLIC BLOOD PRESSURE: 56 MMHG | OXYGEN SATURATION: 98 % | WEIGHT: 136 LBS | SYSTOLIC BLOOD PRESSURE: 113 MMHG | HEART RATE: 84 BPM | BODY MASS INDEX: 23.22 KG/M2

## 2022-08-24 DIAGNOSIS — I25.10 CORONARY ARTERY CALCIFICATION: ICD-10-CM

## 2022-08-24 DIAGNOSIS — R55 SYNCOPE, UNSPECIFIED SYNCOPE TYPE: Primary | ICD-10-CM

## 2022-08-24 DIAGNOSIS — E78.5 HYPERLIPIDEMIA, UNSPECIFIED HYPERLIPIDEMIA TYPE: ICD-10-CM

## 2022-08-24 DIAGNOSIS — I25.84 CORONARY ARTERY CALCIFICATION: ICD-10-CM

## 2022-08-24 DIAGNOSIS — F32.A DEPRESSION, UNSPECIFIED DEPRESSION TYPE: ICD-10-CM

## 2022-08-24 PROCEDURE — G8427 DOCREV CUR MEDS BY ELIG CLIN: HCPCS | Performed by: INTERNAL MEDICINE

## 2022-08-24 PROCEDURE — G8420 CALC BMI NORM PARAMETERS: HCPCS | Performed by: INTERNAL MEDICINE

## 2022-08-24 PROCEDURE — 99214 OFFICE O/P EST MOD 30 MIN: CPT | Performed by: INTERNAL MEDICINE

## 2022-08-24 PROCEDURE — 1123F ACP DISCUSS/DSCN MKR DOCD: CPT | Performed by: INTERNAL MEDICINE

## 2022-08-24 PROCEDURE — G8399 PT W/DXA RESULTS DOCUMENT: HCPCS | Performed by: INTERNAL MEDICINE

## 2022-08-24 PROCEDURE — G8432 DEP SCR NOT DOC, RNG: HCPCS | Performed by: INTERNAL MEDICINE

## 2022-08-24 PROCEDURE — 1090F PRES/ABSN URINE INCON ASSESS: CPT | Performed by: INTERNAL MEDICINE

## 2022-08-24 PROCEDURE — 1101F PT FALLS ASSESS-DOCD LE1/YR: CPT | Performed by: INTERNAL MEDICINE

## 2022-08-24 PROCEDURE — G9899 SCRN MAM PERF RSLTS DOC: HCPCS | Performed by: INTERNAL MEDICINE

## 2022-08-24 PROCEDURE — 3017F COLORECTAL CA SCREEN DOC REV: CPT | Performed by: INTERNAL MEDICINE

## 2022-08-24 PROCEDURE — G8536 NO DOC ELDER MAL SCRN: HCPCS | Performed by: INTERNAL MEDICINE

## 2022-08-24 RX ORDER — ASPIRIN 81 MG/1
81 TABLET ORAL DAILY
COMMUNITY

## 2022-08-24 RX ORDER — CETIRIZINE HCL 10 MG
10 TABLET ORAL DAILY
COMMUNITY
Start: 2022-08-04

## 2022-08-24 RX ORDER — MIDODRINE HYDROCHLORIDE 5 MG/1
5 TABLET ORAL
Qty: 90 TABLET | Refills: 5 | Status: SHIPPED | OUTPATIENT
Start: 2022-08-24

## 2022-08-24 NOTE — PROGRESS NOTES
1. Have you been to the ER, urgent care clinic since your last visit? Hospitalized since your last visit? No    2. Have you seen or consulted any other health care providers outside of the [address] [place] [address] since your last visit? Include any pap smears or colon screening.  No

## 2022-08-24 NOTE — PROGRESS NOTES
HISTORY OF PRESENT ILLNESS  Abbey Litten is a 67 y.o. female. 2022  Patient seen today for new patient evaluation. She is referred here for evaluation of dizziness and presyncope. Patient has been having episode of dizziness that started sometime in January not progressively worse. This happens very frequently. Happens on position changes like sitting or standing or walking. She also feels like transiently she has blackout spells but does not fall she holds something and slowly recovers. Denies any palpitation, chest pain or other associated symptoms no shortness of breath or edema. No tingling or numbness. No prior history of cardiac issues. Patient has been on Prozac and trazodone for some time and for about a year she has been started on Wellbutrin. History of hyperlipidemia on combination therapy      Review of Systems   Constitutional:  Negative for chills and fever. HENT:  Negative for nosebleeds. Eyes:  Negative for blurred vision and double vision. Respiratory:  Negative for cough, hemoptysis, sputum production, shortness of breath and wheezing. Cardiovascular:  Negative for chest pain, palpitations, orthopnea, claudication, leg swelling and PND. Gastrointestinal:  Negative for abdominal pain, heartburn, nausea and vomiting. Musculoskeletal:  Negative for myalgias. Skin:  Negative for rash. Neurological:  Negative for dizziness, weakness and headaches. Endo/Heme/Allergies:  Does not bruise/bleed easily.    Family History   Problem Relation Age of Onset    Heart Failure Mother          at age 80 of congestive heart failure    Heart Disease Mother     Cancer Father          at age 54 of lung cancer; smoker and asbestos exposure       Past Medical History:   Diagnosis Date    Anxiety disorder     Arthritis     Basal joint arthritis, left thumb    GERD (gastroesophageal reflux disease)     Headache(784.0)     Hearing reduced     Left knee pain     Lower back pain July 2010    Menopause     Age 39    Osteoarthritis of left knee     Pain of left thumb     Primary localized osteoarthritis of left knee 8/17/2020    Psychiatric disorder     depression    Sacroiliitis ([address] 9/15/2010    SI (sacroiliac) joint dysfunction     Sinus trouble     Ulcer 2006    polyp, positive heme occult stools, denies ulcer       Past Surgical History:   Procedure Laterality Date    HX DILATION AND CURETTAGE      HX ORTHOPAEDIC  9/15/2010    Left SI joint injection 30mg Celestone and 4 cc Lidocaine    HX TUBAL LIGATION         Social History     Tobacco Use    Smoking status: Never    Smokeless tobacco: Never    Tobacco comments:      SMOKED    Substance Use Topics    Alcohol use: No     Alcohol/week: 0.0 standard drinks       Allergies   Allergen Reactions    Topamax [Topiramate] Other (comments)     Whole body tingling  Blurry vision       Prior to Admission medications    Medication Sig Start Date End Date Taking? Authorizing Provider   cetirizine (ZYRTEC) 10 mg tablet Take 10 mg by mouth daily. 8/4/22  Yes Provider, Historical   aspirin delayed-release 81 mg tablet Take 81 mg by mouth daily. Yes Provider, Historical   midodrine (PROAMATINE) 5 mg tablet Take 1 Tablet by mouth three (3) times daily (with meals). 8/24/22  Yes Cm Correa MD   buPROPion SR [facility] SR) 150 mg SR tablet Take 150 mg by mouth daily. Indications: anxiousness associated with depression   Yes Provider, Historical   rosuvastatin (CRESTOR) 10 mg tablet Take 10 mg by mouth nightly. Yes Provider, Historical   FLUoxetine (PROzac) 20 mg capsule Take 60 mg by mouth daily. Indications: anxiousness associated with depression   Yes Provider, Historical   traZODone (DESYREL) 100 mg tablet Take 100 mg by mouth nightly. Yes Provider, Historical   cyanocobalamin (VITAMIN B12) 500 mcg tablet Take 500 mcg by mouth daily. Yes Provider, Historical   calcium 600 mg Cap Take  by mouth daily.    Yes Provider, Historical omeprazole (PRILOSEC OTC) 20 mg tablet Take 20 mg by mouth daily. Yes Provider, Historical         Visit Vitals  BP (!) 113/56 (BP 1 Location: Left upper arm, BP Patient Position: Sitting, BP Cuff Size: Small adult)   Pulse 84   Ht 5' 4\" (1.626 m)   Wt 61.7 kg (136 lb)   SpO2 98%   BMI 23.34 kg/m²           Physical Exam  Constitutional:       Appearance: She is well-developed. HENT:      Head: Normocephalic and atraumatic. Eyes:      Conjunctiva/sclera: Conjunctivae normal.   Neck:      Thyroid: No thyromegaly. Vascular: No JVD. Trachea: No tracheal deviation. Cardiovascular:      Rate and Rhythm: Normal rate and regular rhythm. Chest Wall: PMI is not displaced. Pulses: No decreased pulses. Heart sounds: No murmur heard. No gallop. No S3 sounds. Pulmonary:      Effort: No respiratory distress. Breath sounds: No wheezing or rales. Chest:      Chest wall: No tenderness. Abdominal:      Palpations: Abdomen is soft. Tenderness: There is no abdominal tenderness. Musculoskeletal:      Cervical back: Neck supple. Skin:     General: Skin is warm. Neurological:      Mental Status: She is alert and oriented to person, place, and time. Ms. Wilfred Mendenhall has a reminder for a \"due or due soon\" health maintenance. I have asked that she contact her primary care provider for follow-up on this health maintenance. No flowsheet data found. I have personally reviewed patient's records available from hospital and other providers and incorporated findings in patient care. Provider notes, labs, EKG, vascular study, MRI    IMPRESSION 5/2022        1. Brain looks normal for age. 2.  4 cm right suboccipital lipoma. I have personally reviewed patients ekg done at other facility.   8/2020-within normal limits    Interpretation Summary 5/2022    Carotid Duplex:     1-49% Stenosis of the Internal Carotid Arteries  Antegrade flow of the Vertebral Arteries  Normal flow of the Subclavian Arteries  Interpretation Summary 8/2022         Left Ventricle: Normal left ventricular systolic function with a visually estimated EF of 55 - 60%. Left ventricle size is normal. Normal wall thickness. Normal wall motion. Diastolic dysfunction present with normal LV EF. Mitral Valve: Mild regurgitation. Tricuspid Valve: Mild regurgitation. The estimated PASP is 28 mmHg. Interatrial Septum: No PFO present visible by color Doppler. 8/2022 tilt table  Hypotension with blood pressure of 43/26 with unresponsiveness. Pulse reported at 78. Likely autonomic dysfunction    CARDIOLOGY REPORT: 7/2022     The patient underwent a limited noncontrast CT scan of her chest with cardiac  gating to evaluate for coronary arterial calcification. Using the Agatston  method the coronary calcium score was calculated. There is a small amount of  coronary calcification present in her left anterior descending artery and her  right coronary artery. Coronary calcium score calculated to be 30. IMPRESSION. Coronary calcium score 30. Assessment         ICD-10-CM ICD-9-CM    1. Syncope, unspecified syncope type  R55 780.2     Positive tilt table with hypotension. No bradycardia. Likely autonomic dysfunction      2. Hyperlipidemia, unspecified hyperlipidemia type  E78.5 272.4     Continue treatment monitor lab with PCP      3. Depression, unspecified depression type  F32. A 311     Continue treatment follow-up with PCP      4. Coronary artery calcification  I25.10 414.00     I25.84 414.4     Mild. Continue treatment monitor        6/16/2022  Seen for new onset progressive dizziness. Could be related to medication. Set up tilt table to evaluate for orthostatic changes. Does not appear arrhythmic in nature get echo for LV function get CT coronary calcium score and decide on statin intensity and aspirin. Continue with hydration and support stocking  8/2022 positive tilt table testing with vasodepressor syncope. Add midodrine  Mild coronary calcification. LDL 55 continue current dose of statin. There are no discontinued medications. Orders Placed This Encounter    midodrine (PROAMATINE) 5 mg tablet     Sig: Take 1 Tablet by mouth three (3) times daily (with meals). Dispense:  90 Tablet     Refill:  5         Follow-up and Dispositions    Return in about 6 months (around 2/24/2023).

## 2022-08-25 NOTE — PROCEDURES
Procedure:  - Tilt table study    Preprocedure diagnosis:  - Dizziness    Procedure:  After informed consent was obtained, the patient was brought to the lab in a fasting and non-sedated state. A peripheral IV was placed. 500 cc NS bolus was given. Patient was placed in the supine position and monitored for 10 minutes. Blood pressure and heart rate were monitored. The patient was tilted to 70 degrees and monitored for 30 minutes. Nitroglycerin, sublingual, was given. The patient was monitored for an additional 10 minutes. After the procedure, the patient was placed back in the supine position. The patient left in stable condition. Rhythm:  Sinus  Symptoms:  Syncope    Vitals:  Baseline supine:  Blood pressure:   100/46  Heart rate:  74    After standing and nitroglyceri (extreme values):    Blood pressure:  43/26  Heart rate:  104    Impression:  Positive tilt table test with severe hypotension and syncope after nitroglycerin. Heart rate increased from 74 bpm to 104 bpm.  No bradycardia. Systolic blood pressure dropped to 40s. Patient was given IV fluid and recovered without complication.

## 2022-10-24 DIAGNOSIS — R55 SYNCOPE, UNSPECIFIED SYNCOPE TYPE: Primary | ICD-10-CM

## 2022-10-24 NOTE — TELEPHONE ENCOUNTER
Requested Prescriptions     Pending Prescriptions Disp Refills    fludrocortisone (FLORINEF) 0.1 mg tablet 90 Tablet 2     Sig: Take 1 Tablet by mouth daily.

## 2022-10-24 NOTE — TELEPHONE ENCOUNTER
Spoke to patient per Dr. Marysol Skelton. D/c Midodrine. Start Florinef 0.1 mg daily. BMP 1 week after starting Florinef. She voices understanding and acceptance of this advice and will call back if any further questions or concerns.

## 2022-10-24 NOTE — TELEPHONE ENCOUNTER
Patient called and wanted to make you aware that the medication Midodrine that was prescribed is making her head itch and she stated her hair is falling out very bad.

## 2022-10-25 ENCOUNTER — HOSPITAL ENCOUNTER (OUTPATIENT)
Dept: LAB | Age: 72
Discharge: HOME | End: 2022-10-25
Payer: MEDICARE

## 2022-10-25 DIAGNOSIS — R55 SYNCOPE, UNSPECIFIED SYNCOPE TYPE: ICD-10-CM

## 2022-10-25 LAB
ANION GAP SERPL CALC-SCNC: 4 MMOL/L (ref 3–18)
BUN SERPL-MCNC: 14 MG/DL (ref 7–18)
BUN/CREAT SERPL: 18 (ref 12–20)
CALCIUM SERPL-MCNC: 9.6 MG/DL (ref 8.5–10.1)
CHLORIDE SERPL-SCNC: 108 MMOL/L (ref 100–111)
CO2 SERPL-SCNC: 29 MMOL/L (ref 21–32)
CREAT SERPL-MCNC: 0.8 MG/DL (ref 0.6–1.3)
GLUCOSE SERPL-MCNC: 85 MG/DL (ref 74–99)
POTASSIUM SERPL-SCNC: 4.5 MMOL/L (ref 3.5–5.5)
SODIUM SERPL-SCNC: 141 MMOL/L (ref 136–145)

## 2022-10-25 PROCEDURE — 36415 COLL VENOUS BLD VENIPUNCTURE: CPT

## 2022-10-25 PROCEDURE — 80048 BASIC METABOLIC PNL TOTAL CA: CPT

## 2022-10-25 RX ORDER — FLUDROCORTISONE ACETATE 0.1 MG/1
0.1 TABLET ORAL DAILY
Qty: 90 TABLET | Refills: 2 | Status: SHIPPED | OUTPATIENT
Start: 2022-10-25

## 2022-10-26 ENCOUNTER — TELEPHONE (OUTPATIENT)
Dept: CARDIOLOGY CLINIC | Age: 72
End: 2022-10-26

## 2022-10-26 DIAGNOSIS — R55 SYNCOPE, UNSPECIFIED SYNCOPE TYPE: Primary | ICD-10-CM

## 2022-10-26 NOTE — TELEPHONE ENCOUNTER
Called and left message on patient voicemail regarding lab/test per Dr. Guille Delacruz. Lab reviewed. No significant abnormality. Please have BMP 1 week after taking Florinef. If any questions to call office.  Patient will have labs done at [facility]

## 2022-10-26 NOTE — TELEPHONE ENCOUNTER
----- Message from Deisy Mcgill MD sent at 10/26/2022  7:41 AM EDT -----  Lab/test  reviewed  No significant abnormality

## 2022-10-31 ENCOUNTER — TELEPHONE (OUTPATIENT)
Dept: CARDIOLOGY CLINIC | Age: 72
End: 2022-10-31

## 2022-11-01 ENCOUNTER — HOSPITAL ENCOUNTER (OUTPATIENT)
Dept: MAMMOGRAPHY | Age: 72
Discharge: HOME | End: 2022-11-01
Attending: PHYSICIAN ASSISTANT
Payer: MEDICARE

## 2022-11-01 ENCOUNTER — TRANSCRIBE ORDER (OUTPATIENT)
Dept: SCHEDULING | Age: 72
End: 2022-11-01

## 2022-11-01 DIAGNOSIS — Z12.31 SCREENING MAMMOGRAM FOR HIGH-RISK PATIENT: Primary | ICD-10-CM

## 2022-11-01 DIAGNOSIS — Z12.31 SCREENING MAMMOGRAM FOR HIGH-RISK PATIENT: ICD-10-CM

## 2022-11-01 PROCEDURE — 77063 BREAST TOMOSYNTHESIS BI: CPT

## 2022-11-01 NOTE — TELEPHONE ENCOUNTER
Called and spoke with patient, advised to stop Florinef and continue Hydration and wearing support stockings. Patient states understanding and will call if any further questions or concerns.

## 2022-11-01 NOTE — TELEPHONE ENCOUNTER
Patient called and states that Florinef helped with her dizziness \"a little but not much\" and states that she's tried taking tylenol with no relief, please advise

## 2022-11-01 NOTE — TELEPHONE ENCOUNTER
Damari Pfeiffer MD  You 6 minutes ago (3:10 PM)     AP  Discontinue Florinef.   Continue with hydration and support stocking

## 2022-11-01 NOTE — TELEPHONE ENCOUNTER
Dilma Interiano MD  You 21 hours ago (4:53 PM)     AP  Trying to get with Tylenol for a few days and see if that works for her dizziness

## 2023-01-10 ENCOUNTER — HOSPITAL ENCOUNTER (OUTPATIENT)
Dept: LAB | Age: 73
Discharge: HOME | End: 2023-01-10
Payer: MEDICARE

## 2023-01-10 DIAGNOSIS — R55 SYNCOPE, UNSPECIFIED SYNCOPE TYPE: ICD-10-CM

## 2023-01-10 LAB
ANION GAP SERPL CALC-SCNC: 5 MMOL/L (ref 3–18)
BUN SERPL-MCNC: 20 MG/DL (ref 7–18)
BUN/CREAT SERPL: 22 (ref 12–20)
CALCIUM SERPL-MCNC: 8.9 MG/DL (ref 8.5–10.1)
CHLORIDE SERPL-SCNC: 109 MMOL/L (ref 100–111)
CO2 SERPL-SCNC: 27 MMOL/L (ref 21–32)
CREAT SERPL-MCNC: 0.92 MG/DL (ref 0.6–1.3)
GLUCOSE SERPL-MCNC: 100 MG/DL (ref 74–99)
POTASSIUM SERPL-SCNC: 4.1 MMOL/L (ref 3.5–5.5)
SODIUM SERPL-SCNC: 141 MMOL/L (ref 136–145)

## 2023-01-10 PROCEDURE — 36415 COLL VENOUS BLD VENIPUNCTURE: CPT

## 2023-01-10 PROCEDURE — 80048 BASIC METABOLIC PNL TOTAL CA: CPT

## 2023-01-11 ENCOUNTER — TELEPHONE (OUTPATIENT)
Dept: CARDIOLOGY CLINIC | Age: 73
End: 2023-01-11

## 2023-01-11 NOTE — TELEPHONE ENCOUNTER
Spoke with patient regarding lab/test per Dr. Ru Tovar. Lab reviewed. No significant abnormality. She voices understanding and acceptance of this advice and will call back if any further questions or concerns.

## 2023-01-11 NOTE — TELEPHONE ENCOUNTER
----- Message from Deisy Mcgill MD sent at 1/11/2023  7:25 AM EST -----  Lab/test  reviewed  No significant abnormality

## 2023-02-21 ENCOUNTER — HOSPITAL ENCOUNTER (OUTPATIENT)
Facility: HOSPITAL | Age: 73
Discharge: HOME | End: 2023-02-24
Payer: MEDICARE

## 2023-02-21 LAB
ALBUMIN SERPL-MCNC: 3.6 G/DL (ref 3.4–5)
ALBUMIN/GLOB SERPL: 1.1 (ref 0.8–1.7)
ALP SERPL-CCNC: 88 U/L (ref 45–117)
ALT SERPL-CCNC: 25 U/L (ref 13–56)
ANION GAP SERPL CALC-SCNC: 4 MMOL/L (ref 3–18)
AST SERPL-CCNC: 23 U/L (ref 10–38)
BASOPHILS # BLD: 0 K/UL (ref 0–0.1)
BASOPHILS NFR BLD: 1 % (ref 0–2)
BILIRUB SERPL-MCNC: 0.3 MG/DL (ref 0.2–1)
BUN SERPL-MCNC: 16 MG/DL (ref 7–18)
BUN/CREAT SERPL: 21 (ref 12–20)
CALCIUM SERPL-MCNC: 9 MG/DL (ref 8.5–10.1)
CHLORIDE SERPL-SCNC: 110 MMOL/L (ref 100–111)
CO2 SERPL-SCNC: 28 MMOL/L (ref 21–32)
CREAT SERPL-MCNC: 0.76 MG/DL (ref 0.6–1.3)
DIFFERENTIAL METHOD BLD: ABNORMAL
EOSINOPHIL # BLD: 0.1 K/UL (ref 0–0.4)
EOSINOPHIL NFR BLD: 1 % (ref 0–5)
ERYTHROCYTE [DISTWIDTH] IN BLOOD BY AUTOMATED COUNT: 12.9 % (ref 11.6–14.5)
GLOBULIN SER CALC-MCNC: 3.3 G/DL (ref 2–4)
GLUCOSE SERPL-MCNC: 88 MG/DL (ref 74–99)
HCT VFR BLD AUTO: 36.5 % (ref 35–45)
HGB BLD-MCNC: 11.5 G/DL (ref 12–16)
IMM GRANULOCYTES # BLD AUTO: 0 K/UL (ref 0–0.04)
IMM GRANULOCYTES NFR BLD AUTO: 0 % (ref 0–0.5)
LYMPHOCYTES # BLD: 1.9 K/UL (ref 0.9–3.6)
LYMPHOCYTES NFR BLD: 35 % (ref 21–52)
MCH RBC QN AUTO: 28.8 PG (ref 24–34)
MCHC RBC AUTO-ENTMCNC: 31.5 G/DL (ref 31–37)
MCV RBC AUTO: 91.3 FL (ref 78–100)
MONOCYTES # BLD: 0.5 K/UL (ref 0.05–1.2)
MONOCYTES NFR BLD: 9 % (ref 3–10)
NEUTS SEG # BLD: 2.9 K/UL (ref 1.8–8)
NEUTS SEG NFR BLD: 54 % (ref 40–73)
NRBC # BLD: 0 K/UL (ref 0–0.01)
NRBC BLD-RTO: 0 PER 100 WBC
PLATELET # BLD AUTO: 307 K/UL (ref 135–420)
PMV BLD AUTO: 8.9 FL (ref 9.2–11.8)
POTASSIUM SERPL-SCNC: 4.5 MMOL/L (ref 3.5–5.5)
PROT SERPL-MCNC: 6.9 G/DL (ref 6.4–8.2)
RBC # BLD AUTO: 4 M/UL (ref 4.2–5.3)
SODIUM SERPL-SCNC: 142 MMOL/L (ref 136–145)
T4 FREE SERPL-MCNC: 1.1 NG/DL (ref 0.7–1.5)
TSH SERPL DL<=0.05 MIU/L-ACNC: 1.04 UIU/ML (ref 0.36–3.74)
WBC # BLD AUTO: 5.4 K/UL (ref 4.6–13.2)

## 2023-02-21 PROCEDURE — 84443 ASSAY THYROID STIM HORMONE: CPT

## 2023-02-21 PROCEDURE — 85025 COMPLETE CBC W/AUTO DIFF WBC: CPT

## 2023-02-21 PROCEDURE — 36415 COLL VENOUS BLD VENIPUNCTURE: CPT

## 2023-02-21 PROCEDURE — 80053 COMPREHEN METABOLIC PANEL: CPT

## 2023-10-17 ENCOUNTER — HOSPITAL ENCOUNTER (OUTPATIENT)
Facility: HOSPITAL | Age: 73
Discharge: HOME | End: 2023-10-20
Payer: MEDICARE

## 2023-10-17 DIAGNOSIS — M54.2 NECK PAIN: ICD-10-CM

## 2023-10-17 DIAGNOSIS — M25.552 HIP PAIN, LEFT: ICD-10-CM

## 2023-10-17 PROCEDURE — 72040 X-RAY EXAM NECK SPINE 2-3 VW: CPT

## 2023-10-17 PROCEDURE — 72100 X-RAY EXAM L-S SPINE 2/3 VWS: CPT

## 2023-12-11 ENCOUNTER — OFFICE VISIT (OUTPATIENT)
Age: 73
End: 2023-12-11
Payer: MEDICARE

## 2023-12-11 VITALS
HEART RATE: 81 BPM | BODY MASS INDEX: 23.9 KG/M2 | WEIGHT: 140 LBS | OXYGEN SATURATION: 98 % | SYSTOLIC BLOOD PRESSURE: 132 MMHG | HEIGHT: 64 IN | DIASTOLIC BLOOD PRESSURE: 70 MMHG

## 2023-12-11 DIAGNOSIS — E78.5 HYPERLIPIDEMIA, UNSPECIFIED HYPERLIPIDEMIA TYPE: ICD-10-CM

## 2023-12-11 DIAGNOSIS — R55 SYNCOPE AND COLLAPSE: ICD-10-CM

## 2023-12-11 DIAGNOSIS — I25.10 ATHEROSCLEROSIS OF NATIVE CORONARY ARTERY OF NATIVE HEART WITHOUT ANGINA PECTORIS: Primary | ICD-10-CM

## 2023-12-11 PROCEDURE — G8484 FLU IMMUNIZE NO ADMIN: HCPCS | Performed by: INTERNAL MEDICINE

## 2023-12-11 PROCEDURE — 1090F PRES/ABSN URINE INCON ASSESS: CPT | Performed by: INTERNAL MEDICINE

## 2023-12-11 PROCEDURE — G8399 PT W/DXA RESULTS DOCUMENT: HCPCS | Performed by: INTERNAL MEDICINE

## 2023-12-11 PROCEDURE — 3017F COLORECTAL CA SCREEN DOC REV: CPT | Performed by: INTERNAL MEDICINE

## 2023-12-11 PROCEDURE — 1123F ACP DISCUSS/DSCN MKR DOCD: CPT | Performed by: INTERNAL MEDICINE

## 2023-12-11 PROCEDURE — 1036F TOBACCO NON-USER: CPT | Performed by: INTERNAL MEDICINE

## 2023-12-11 PROCEDURE — G8428 CUR MEDS NOT DOCUMENT: HCPCS | Performed by: INTERNAL MEDICINE

## 2023-12-11 PROCEDURE — 99213 OFFICE O/P EST LOW 20 MIN: CPT | Performed by: INTERNAL MEDICINE

## 2023-12-11 PROCEDURE — G8420 CALC BMI NORM PARAMETERS: HCPCS | Performed by: INTERNAL MEDICINE

## 2023-12-11 ASSESSMENT — PATIENT HEALTH QUESTIONNAIRE - PHQ9
SUM OF ALL RESPONSES TO PHQ QUESTIONS 1-9: 0
1. LITTLE INTEREST OR PLEASURE IN DOING THINGS: 0
SUM OF ALL RESPONSES TO PHQ9 QUESTIONS 1 & 2: 0
SUM OF ALL RESPONSES TO PHQ QUESTIONS 1-9: 0
2. FEELING DOWN, DEPRESSED OR HOPELESS: 0
SUM OF ALL RESPONSES TO PHQ QUESTIONS 1-9: 0
SUM OF ALL RESPONSES TO PHQ QUESTIONS 1-9: 0
DEPRESSION UNABLE TO ASSESS: FUNCTIONAL CAPACITY MOTIVATION LIMITS ACCURACY

## 2023-12-11 NOTE — PROGRESS NOTES
1. Have you been to the ER, urgent care clinic since your last visit? Hospitalized since your last visit? No    2. Have you seen or consulted any other health care providers outside of the [address] [place] [address] since your last visit? Include any pap smears or colon screening.       No

## 2023-12-11 NOTE — PROGRESS NOTES
HISTORY OF PRESENT ILLNESS  Erendira Templeton is a 67 y.o. female. 2022  Patient seen today for new patient evaluation. She is referred here for evaluation of dizziness and presyncope. Patient has been having episode of dizziness that started sometime in January not progressively worse. This happens very frequently. Happens on position changes like sitting or standing or walking. She also feels like transiently she has blackout spells but does not fall she holds something and slowly recovers. Denies any palpitation, chest pain or other associated symptoms no shortness of breath or edema. No tingling or numbness. No prior history of cardiac issues. Patient has been on Prozac and trazodone for some time and for about a year she has been started on Wellbutrin. History of hyperlipidemia on combination therapy      Review of Systems   Constitutional:  Negative for chills and fever. HENT:  Negative for nosebleeds. Eyes:  Negative for blurred vision and double vision. Respiratory:  Negative for cough, hemoptysis, sputum production, shortness of breath and wheezing. Cardiovascular:  Negative for chest pain, palpitations, orthopnea, claudication, leg swelling and PND. Gastrointestinal:  Negative for abdominal pain, heartburn, nausea and vomiting. Musculoskeletal:  Negative for myalgias. Skin:  Negative for rash. Neurological:  Negative for dizziness, weakness and headaches. Endo/Heme/Allergies:  Does not bruise/bleed easily.    Family History   Problem Relation Age of Onset    Heart Failure Mother          at age 80 of congestive heart failure    Cancer Father          at age 54 of lung cancer; smoker and asbestos exposure    Heart Disease Mother        Past Medical History:   Diagnosis Date    Anxiety disorder     Arthritis     Basal joint arthritis, left thumb    GERD (gastroesophageal reflux disease)     Headache(784.0)     Hearing reduced     Left knee pain     Lower back pain

## 2024-01-08 ENCOUNTER — HOSPITAL ENCOUNTER (OUTPATIENT)
Facility: HOSPITAL | Age: 74
Discharge: HOME | End: 2024-01-11
Payer: MEDICARE

## 2024-01-08 VITALS — WEIGHT: 140 LBS | HEIGHT: 64 IN | BODY MASS INDEX: 23.9 KG/M2

## 2024-01-08 DIAGNOSIS — Z12.31 VISIT FOR SCREENING MAMMOGRAM: ICD-10-CM

## 2024-01-08 PROCEDURE — 77063 BREAST TOMOSYNTHESIS BI: CPT

## 2024-01-22 ENCOUNTER — OFFICE VISIT (OUTPATIENT)
Age: 74
End: 2024-01-22
Payer: MEDICARE

## 2024-01-22 VITALS
OXYGEN SATURATION: 98 % | BODY MASS INDEX: 23.9 KG/M2 | TEMPERATURE: 98.1 F | WEIGHT: 140 LBS | HEART RATE: 80 BPM | HEIGHT: 64 IN

## 2024-01-22 DIAGNOSIS — G89.29 CHRONIC LEFT SACROILIAC JOINT PAIN: Primary | ICD-10-CM

## 2024-01-22 DIAGNOSIS — M54.2 NECK PAIN ON RIGHT SIDE: ICD-10-CM

## 2024-01-22 DIAGNOSIS — M53.3 CHRONIC LEFT SACROILIAC JOINT PAIN: Primary | ICD-10-CM

## 2024-01-22 PROCEDURE — G8420 CALC BMI NORM PARAMETERS: HCPCS | Performed by: PHYSICAL MEDICINE & REHABILITATION

## 2024-01-22 PROCEDURE — 1036F TOBACCO NON-USER: CPT | Performed by: PHYSICAL MEDICINE & REHABILITATION

## 2024-01-22 PROCEDURE — G8399 PT W/DXA RESULTS DOCUMENT: HCPCS | Performed by: PHYSICAL MEDICINE & REHABILITATION

## 2024-01-22 PROCEDURE — 3017F COLORECTAL CA SCREEN DOC REV: CPT | Performed by: PHYSICAL MEDICINE & REHABILITATION

## 2024-01-22 PROCEDURE — G8427 DOCREV CUR MEDS BY ELIG CLIN: HCPCS | Performed by: PHYSICAL MEDICINE & REHABILITATION

## 2024-01-22 PROCEDURE — G8484 FLU IMMUNIZE NO ADMIN: HCPCS | Performed by: PHYSICAL MEDICINE & REHABILITATION

## 2024-01-22 PROCEDURE — 1090F PRES/ABSN URINE INCON ASSESS: CPT | Performed by: PHYSICAL MEDICINE & REHABILITATION

## 2024-01-22 PROCEDURE — 1123F ACP DISCUSS/DSCN MKR DOCD: CPT | Performed by: PHYSICAL MEDICINE & REHABILITATION

## 2024-01-22 PROCEDURE — 99204 OFFICE O/P NEW MOD 45 MIN: CPT | Performed by: PHYSICAL MEDICINE & REHABILITATION

## 2024-01-22 RX ORDER — TIZANIDINE 2 MG/1
2 TABLET ORAL NIGHTLY PRN
Qty: 30 TABLET | Refills: 0 | Status: SHIPPED | OUTPATIENT
Start: 2024-01-22 | End: 2024-02-21

## 2024-01-22 RX ORDER — OMEPRAZOLE 20 MG/1
20 CAPSULE, DELAYED RELEASE ORAL DAILY
COMMUNITY
Start: 2023-11-21

## 2024-01-22 RX ORDER — ASCORBIC ACID 500 MG
500 TABLET ORAL DAILY
COMMUNITY

## 2024-01-22 NOTE — PROGRESS NOTES
[facility]  [address] [place] [address], [address]  [place] [address]  Phone: [phone]  Fax: [phone]      Patient: Shira Norwood                                                                              MRN: [id number]        YOB: 1950          AGE: 73 y.o.             PCP: Wendy Gillette PA  Date:  01/22/24    Reason for Consultation: Neck Pain (Right side/) and Lower Back Pain      HPI:  Shira Norwood is a 73 y.o. female with relevant PMH of hearing loss who presents with neck  pain and chronic back pain .  The back pain  has been present for over 10 years.  She was previously seen here by Dr. Toledo and tried sacroiliac joint injections which she believes were helpful.   The neck pain began 8 months prior. Denies any precipitating incident or trauma.   She was seen in the ED at [facility] 12/28/2023- and had a CT scan of her abd and pelvis which was unremarkable     Neurologic symptoms: No numbness, tingling, weakness, bowel or bladder changes.  No recent falls      Location: The pain is located in the neck pain   Radiation: The pain does not radiate .    Pain Score: Currently: 4/10   Quality: Pain is of a pulling, stiff quality.    Aggravating: Pain is exacerbated by walking and turning neck  Alleviating: The pain is alleviated by sitting    Prior Treatments:  Physical therapy: No  Injections:Yes- bilateral SI joint injections in 2015, 2014, 2013- stopped because of insurance issues     Surgery:No  Previous Medications:   Current Medications: Aleve   Previous work-up has included:   Images: The imaging results as well as the actual images of the studies below were reviewed, visualized and interpreted by me.     Labs:  The results below were reviewed.   10/18/2023   Cervical spine: Grade 1 retrolisthesis of C5 with respect to C6. Mild/moderate  multilevel intervertebral disc space narrowing most notably at C5-C6 and C6-C7.  Disproportionate

## 2024-01-22 NOTE — PROGRESS NOTES
Shira Norwood presents today for   Chief Complaint   Patient presents with    Neck Pain     Right side      Lower Back Pain       Is someone accompanying this pt? no    Is the patient using any DME equipment during OV? no    Coordination of Care:  1. Have you been to the ER, urgent care clinic since your last visit? Yes, pt went to the ER in December for bad back pain.   Hospitalized since your last visit? no    2. Have you seen or consulted any other health care providers outside of the [facility] since your last visit? Yes, cardiology Include any pap smears or colon screening. no

## 2024-01-30 ENCOUNTER — HOSPITAL ENCOUNTER (OUTPATIENT)
Facility: HOSPITAL | Age: 74
Discharge: HOME | End: 2024-02-02
Payer: MEDICARE

## 2024-01-30 VITALS
HEART RATE: 77 BPM | SYSTOLIC BLOOD PRESSURE: 121 MMHG | RESPIRATION RATE: 16 BRPM | OXYGEN SATURATION: 96 % | TEMPERATURE: 98.4 F | DIASTOLIC BLOOD PRESSURE: 71 MMHG

## 2024-01-30 PROCEDURE — 2500000003 HC RX 250 WO HCPCS: Performed by: PHYSICAL MEDICINE & REHABILITATION

## 2024-01-30 PROCEDURE — 6370000000 HC RX 637 (ALT 250 FOR IP): Performed by: PHYSICAL MEDICINE & REHABILITATION

## 2024-01-30 PROCEDURE — 27096 INJECT SACROILIAC JOINT: CPT | Performed by: PHYSICAL MEDICINE & REHABILITATION

## 2024-01-30 PROCEDURE — 6360000004 HC RX CONTRAST MEDICATION: Performed by: PHYSICAL MEDICINE & REHABILITATION

## 2024-01-30 PROCEDURE — 6360000002 HC RX W HCPCS: Performed by: PHYSICAL MEDICINE & REHABILITATION

## 2024-01-30 PROCEDURE — 27096 INJECT SACROILIAC JOINT: CPT

## 2024-01-30 RX ORDER — IOPAMIDOL 408 MG/ML
4 INJECTION, SOLUTION INTRATHECAL
Status: COMPLETED | OUTPATIENT
Start: 2024-01-30 | End: 2024-01-30

## 2024-01-30 RX ORDER — DEXAMETHASONE SODIUM PHOSPHATE 10 MG/ML
10 INJECTION, SOLUTION INTRAMUSCULAR; INTRAVENOUS ONCE
Status: COMPLETED | OUTPATIENT
Start: 2024-01-30 | End: 2024-01-30

## 2024-01-30 RX ORDER — DIAZEPAM 5 MG/1
10 TABLET ORAL ONCE
Status: COMPLETED | OUTPATIENT
Start: 2024-01-30 | End: 2024-01-30

## 2024-01-30 RX ORDER — DIAZEPAM 5 MG/1
2.5 TABLET ORAL ONCE
Status: COMPLETED | OUTPATIENT
Start: 2024-01-30 | End: 2024-01-30

## 2024-01-30 RX ORDER — LIDOCAINE HYDROCHLORIDE 10 MG/ML
30 INJECTION, SOLUTION EPIDURAL; INFILTRATION; INTRACAUDAL; PERINEURAL ONCE
Status: COMPLETED | OUTPATIENT
Start: 2024-01-30 | End: 2024-01-30

## 2024-01-30 RX ORDER — DIAZEPAM 5 MG/1
5 TABLET ORAL ONCE
Status: COMPLETED | OUTPATIENT
Start: 2024-01-30 | End: 2024-01-30

## 2024-01-30 RX ADMIN — DIAZEPAM 5 MG: 5 TABLET ORAL at 10:51

## 2024-01-30 RX ADMIN — LIDOCAINE HYDROCHLORIDE 15 ML: 10 INJECTION, SOLUTION EPIDURAL; INFILTRATION; INTRACAUDAL; PERINEURAL at 11:05

## 2024-01-30 RX ADMIN — IOPAMIDOL 1 ML: 408 INJECTION, SOLUTION INTRATHECAL at 11:08

## 2024-01-30 RX ADMIN — DEXAMETHASONE SODIUM PHOSPHATE 10 MG: 10 INJECTION, SOLUTION INTRAMUSCULAR; INTRAVENOUS at 11:08

## 2024-01-30 ASSESSMENT — PAIN SCALES - GENERAL: PAINLEVEL_OUTOF10: 0

## 2024-01-30 ASSESSMENT — PAIN - FUNCTIONAL ASSESSMENT: PAIN_FUNCTIONAL_ASSESSMENT: 0-10

## 2024-01-30 NOTE — PROCEDURES
Unilateral Sacroiliac Joint Injection Procedure Note    Patient Name: Shira Norwood    Date of Procedure: January 30, 2024    Preoperative Diagnosis:Sacroiliac Dysfunction    Post Operative Diagnosis: same    Procedure: SI Joint Injection, Intra-articular and extra-articular - Unilateral  left    Consent: Informed consent was obtained prior to the procedure. The patient was given the opportunity to ask questions regarding the procedure and its associated risks. In addition to the potential risks associated with the procedure itself, the patient was informed both verbally and in writing of potential side effects of the use of corticosteroids. The patient appeared to comprehend the informed consent and desired to have the procedure performed.    Procedure: The patient was placed in the prone position on the flouroscopy table and the back was prepped and draped in the usual sterile manner.After local Lidocaine 1% infiltration, a #22 gauge spinal needle was then advanced to lie within the Sacroiliac Joint. Yes about 1cc of Isovue was used to confirm placement, no vascular uptake was identified.    A total of 10 mg of Dexamethasone  and 5 ml of Lidocaine was introduced in and around the joint.     The injection area was cleaned and bandaids applied. No excessive bleeding was noted. Patient dressed and was discharged to home with instructions.    Discussion:  The patient tolerated the procedure well.Patient reported matthew-procedural pain on Visual Analog Scale:  pre-8; post-0.        DIANE BROWN MD  January 30, 2024

## 2024-03-11 ENCOUNTER — OFFICE VISIT (OUTPATIENT)
Age: 74
End: 2024-03-11
Payer: MEDICARE

## 2024-03-11 VITALS
HEART RATE: 86 BPM | DIASTOLIC BLOOD PRESSURE: 68 MMHG | SYSTOLIC BLOOD PRESSURE: 126 MMHG | OXYGEN SATURATION: 95 % | WEIGHT: 142 LBS | HEIGHT: 64 IN | BODY MASS INDEX: 24.24 KG/M2 | TEMPERATURE: 97.8 F

## 2024-03-11 DIAGNOSIS — M53.3 CHRONIC LEFT SACROILIAC JOINT PAIN: ICD-10-CM

## 2024-03-11 DIAGNOSIS — M54.50 CHRONIC LEFT-SIDED LOW BACK PAIN WITHOUT SCIATICA: Primary | ICD-10-CM

## 2024-03-11 DIAGNOSIS — G89.29 CHRONIC LEFT SACROILIAC JOINT PAIN: ICD-10-CM

## 2024-03-11 DIAGNOSIS — G89.29 CHRONIC LEFT-SIDED LOW BACK PAIN WITHOUT SCIATICA: Primary | ICD-10-CM

## 2024-03-11 DIAGNOSIS — M54.2 NECK PAIN ON RIGHT SIDE: ICD-10-CM

## 2024-03-11 PROCEDURE — 3017F COLORECTAL CA SCREEN DOC REV: CPT | Performed by: PHYSICAL MEDICINE & REHABILITATION

## 2024-03-11 PROCEDURE — 1090F PRES/ABSN URINE INCON ASSESS: CPT | Performed by: PHYSICAL MEDICINE & REHABILITATION

## 2024-03-11 PROCEDURE — G8420 CALC BMI NORM PARAMETERS: HCPCS | Performed by: PHYSICAL MEDICINE & REHABILITATION

## 2024-03-11 PROCEDURE — G8484 FLU IMMUNIZE NO ADMIN: HCPCS | Performed by: PHYSICAL MEDICINE & REHABILITATION

## 2024-03-11 PROCEDURE — G8427 DOCREV CUR MEDS BY ELIG CLIN: HCPCS | Performed by: PHYSICAL MEDICINE & REHABILITATION

## 2024-03-11 PROCEDURE — 99214 OFFICE O/P EST MOD 30 MIN: CPT | Performed by: PHYSICAL MEDICINE & REHABILITATION

## 2024-03-11 PROCEDURE — G8399 PT W/DXA RESULTS DOCUMENT: HCPCS | Performed by: PHYSICAL MEDICINE & REHABILITATION

## 2024-03-11 PROCEDURE — 1036F TOBACCO NON-USER: CPT | Performed by: PHYSICAL MEDICINE & REHABILITATION

## 2024-03-11 PROCEDURE — 1123F ACP DISCUSS/DSCN MKR DOCD: CPT | Performed by: PHYSICAL MEDICINE & REHABILITATION

## 2024-03-11 RX ORDER — TIZANIDINE 2 MG/1
2 TABLET ORAL NIGHTLY PRN
Qty: 30 TABLET | Refills: 0 | Status: SHIPPED | OUTPATIENT
Start: 2024-03-11

## 2024-03-11 ASSESSMENT — PATIENT HEALTH QUESTIONNAIRE - PHQ9
SUM OF ALL RESPONSES TO PHQ9 QUESTIONS 1 & 2: 0
SUM OF ALL RESPONSES TO PHQ QUESTIONS 1-9: 0
1. LITTLE INTEREST OR PLEASURE IN DOING THINGS: 0
SUM OF ALL RESPONSES TO PHQ QUESTIONS 1-9: 0
2. FEELING DOWN, DEPRESSED OR HOPELESS: 0

## 2024-03-11 NOTE — PROGRESS NOTES
[facility]  [facility], [address]  [place] [address]  Phone: [phone]  Fax: [phone]      Patient: Shira Norwood                                                                              MRN: [id number]        YOB: 1950          AGE: 73 y.o.             PCP: Wendy Gillette PA  Date:  03/11/24    Reason for Consultation: Back Pain (Left lower back )      HPI:  Shira Norwood is a 73 y.o. female with relevant PMH of hearing loss who presented with neck  pain and chronic back pain .  The back pain  has been present for over 10 years.  She was previously seen here by Dr. Toledo and tried sacroiliac joint injections which she believes were helpful.   The neck pain began 8 months prior. Denies any precipitating incident or trauma.   She was seen in the ED at [facility] 12/28/2023- and had a CT scan of her abd and pelvis which was unremarkable. She tried a repeat left SI joint injection 1/30/2024 which gave about 1 week of relief but pain has returned.      Neurologic symptoms: No numbness, tingling, weakness, bowel or bladder changes.  No recent falls      Location: The pain is located in the neck pain 2. Left SI joint   Radiation: The pain does not radiate .    Pain Score: Currently: 5/10   Quality: Pain is of a pulling, stiff quality.    Aggravating: Pain is exacerbated by walking and turning neck  Alleviating: The pain is alleviated by sitting    Prior Treatments:  Physical therapy: Physician guided home exercises since 1/22/204  Injections:Yes- bilateral SI joint injections in 2015, 2014, 2013- stopped because of insurance issues   1/30/2024- left SI joint injections 1 week of relief 75%  Surgery:No  Previous Medications:   Current Medications: Aleve   Previous work-up has included:   Images: The imaging results as well as the actual images of the studies below were reviewed, visualized and interpreted by me.     Labs:  The results

## 2024-03-11 NOTE — PROGRESS NOTES
Shira Norwood presents today for   Chief Complaint   Patient presents with    Back Pain     Left lower back        Is someone accompanying this pt? Yes, grandson    Is the patient using any DME equipment during OV? no    Coordination of Care:  1. Have you been to the ER, urgent care clinic since your last visit? Yes, ER for back pain 2/2024  Hospitalized since your last visit? no    2. Have you seen or consulted any other health care providers outside of the [facility] since your last visit? PCP Include any pap smears or colon screening. no

## 2024-04-03 ENCOUNTER — HOSPITAL ENCOUNTER (OUTPATIENT)
Facility: HOSPITAL | Age: 74
Discharge: HOME | End: 2024-04-06
Attending: PHYSICAL MEDICINE & REHABILITATION
Payer: MEDICARE

## 2024-04-03 DIAGNOSIS — G89.29 CHRONIC LEFT-SIDED LOW BACK PAIN WITHOUT SCIATICA: ICD-10-CM

## 2024-04-03 DIAGNOSIS — M54.50 CHRONIC LEFT-SIDED LOW BACK PAIN WITHOUT SCIATICA: ICD-10-CM

## 2024-04-03 PROCEDURE — 72148 MRI LUMBAR SPINE W/O DYE: CPT

## 2024-04-24 ENCOUNTER — OFFICE VISIT (OUTPATIENT)
Age: 74
End: 2024-04-24
Payer: MEDICARE

## 2024-04-24 VITALS
HEIGHT: 64 IN | SYSTOLIC BLOOD PRESSURE: 131 MMHG | OXYGEN SATURATION: 96 % | DIASTOLIC BLOOD PRESSURE: 76 MMHG | HEART RATE: 82 BPM | WEIGHT: 144 LBS | TEMPERATURE: 97.6 F | BODY MASS INDEX: 24.59 KG/M2

## 2024-04-24 DIAGNOSIS — M54.50 CHRONIC LEFT-SIDED LOW BACK PAIN WITHOUT SCIATICA: ICD-10-CM

## 2024-04-24 DIAGNOSIS — G89.29 CHRONIC LEFT-SIDED LOW BACK PAIN WITHOUT SCIATICA: ICD-10-CM

## 2024-04-24 DIAGNOSIS — M47.816 FACET ARTHRITIS, DEGENERATIVE, LUMBAR SPINE: Primary | ICD-10-CM

## 2024-04-24 DIAGNOSIS — M25.512 ACUTE PAIN OF LEFT SHOULDER: ICD-10-CM

## 2024-04-24 PROCEDURE — G8427 DOCREV CUR MEDS BY ELIG CLIN: HCPCS | Performed by: PHYSICAL MEDICINE & REHABILITATION

## 2024-04-24 PROCEDURE — 1036F TOBACCO NON-USER: CPT | Performed by: PHYSICAL MEDICINE & REHABILITATION

## 2024-04-24 PROCEDURE — G8420 CALC BMI NORM PARAMETERS: HCPCS | Performed by: PHYSICAL MEDICINE & REHABILITATION

## 2024-04-24 PROCEDURE — 99214 OFFICE O/P EST MOD 30 MIN: CPT | Performed by: PHYSICAL MEDICINE & REHABILITATION

## 2024-04-24 PROCEDURE — 1123F ACP DISCUSS/DSCN MKR DOCD: CPT | Performed by: PHYSICAL MEDICINE & REHABILITATION

## 2024-04-24 PROCEDURE — 1090F PRES/ABSN URINE INCON ASSESS: CPT | Performed by: PHYSICAL MEDICINE & REHABILITATION

## 2024-04-24 PROCEDURE — G8399 PT W/DXA RESULTS DOCUMENT: HCPCS | Performed by: PHYSICAL MEDICINE & REHABILITATION

## 2024-04-24 PROCEDURE — 3017F COLORECTAL CA SCREEN DOC REV: CPT | Performed by: PHYSICAL MEDICINE & REHABILITATION

## 2024-04-24 NOTE — PROGRESS NOTES
Shira Norwood presents today for   Chief Complaint   Patient presents with    Lower Back Pain       Is someone accompanying this pt? no    Is the patient using any DME equipment during OV? no      Coordination of Care:  1. Have you been to the ER, urgent care clinic since your last visit? no  Hospitalized since your last visit? no    2. Have you seen or consulted any other health care providers outside of the [facility] since your last visit? no Include any pap smears or colon screening. no        
neural foraminal stenosis. This is unchanged.  Past Medical History:   Past Medical History:   Diagnosis Date    Anxiety disorder     Arthritis     Basal joint arthritis, left thumb    GERD (gastroesophageal reflux disease)     Headache(784.0)     Hearing reduced     Left knee pain     Lower back pain 2010    Menopause     Age 45    Osteoarthritis of left knee     Pain of left thumb     Primary localized osteoarthritis of left knee 2020    Psychiatric disorder     depression    Sacroiliitis (HCC) 9/15/2010    SI (sacroiliac) joint dysfunction     Sinus trouble     Ulcer 2006    polyp, positive heme occult stools, denies ulcer      Past Surgical History:   Past Surgical History:   Procedure Laterality Date    DILATION AND CURETTAGE OF UTERUS      ORTHOPEDIC SURGERY  9/15/2010    Left SI joint injection 30mg Celestone and 4 cc Lidocaine    TUBAL LIGATION        SocHx:   Social History     Tobacco Use    Smoking status: Former     Current packs/day: 0.00     Types: Cigarettes     Start date: 1969     Quit date: 2016     Years since quittin.0    Smokeless tobacco: Never    Tobacco comments:     Quit smoking:  SMOKED   Substance Use Topics    Alcohol use: No     Alcohol/week: 0.0 standard drinks of alcohol      FamHx:?   Family History   Problem Relation Age of Onset    Heart Failure Mother          at age 83 of congestive heart failure    Cancer Father          at age 55 of lung cancer; smoker and asbestos exposure    Heart Disease Mother        Current Medications:   Current Outpatient Medications   Medication Sig Dispense Refill    tiZANidine (ZANAFLEX) 2 MG tablet Take 1 tablet by mouth nightly as needed (spams) 30 tablet 0    omeprazole (PRILOSEC) 20 MG delayed release capsule Take 1 capsule by mouth daily      vitamin C (ASCORBIC ACID) 500 MG tablet Take 1 tablet by mouth daily      calcium carbonate 1500 (600 Ca) MG TABS tablet Take 2 tablets by mouth Daily

## 2024-05-03 ENCOUNTER — OFFICE VISIT (OUTPATIENT)
Age: 74
End: 2024-05-03

## 2024-05-03 VITALS — RESPIRATION RATE: 16 BRPM | HEIGHT: 64 IN | WEIGHT: 143 LBS | BODY MASS INDEX: 24.41 KG/M2

## 2024-05-03 DIAGNOSIS — M75.102 ROTATOR CUFF SYNDROME OF LEFT SHOULDER: Primary | ICD-10-CM

## 2024-05-03 DIAGNOSIS — M25.512 ACUTE PAIN OF LEFT SHOULDER: ICD-10-CM

## 2024-05-03 DIAGNOSIS — M19.012 PRIMARY OSTEOARTHRITIS, LEFT SHOULDER: ICD-10-CM

## 2024-05-03 RX ORDER — TRIAMCINOLONE ACETONIDE 40 MG/ML
40 INJECTION, SUSPENSION INTRA-ARTICULAR; INTRAMUSCULAR ONCE
Status: COMPLETED | OUTPATIENT
Start: 2024-05-03 | End: 2024-05-03

## 2024-05-03 RX ORDER — LIDOCAINE HYDROCHLORIDE 10 MG/ML
3 INJECTION, SOLUTION INFILTRATION; PERINEURAL ONCE
Status: COMPLETED | OUTPATIENT
Start: 2024-05-03 | End: 2024-05-03

## 2024-05-03 RX ADMIN — LIDOCAINE HYDROCHLORIDE 3 ML: 10 INJECTION, SOLUTION INFILTRATION; PERINEURAL at 11:35

## 2024-05-03 RX ADMIN — TRIAMCINOLONE ACETONIDE 40 MG: 40 INJECTION, SUSPENSION INTRA-ARTICULAR; INTRAMUSCULAR at 11:35

## 2024-05-03 NOTE — PROGRESS NOTES
[facility] AMBULATORY OFFICE NOTE      Patient: Shira Norwood                MRN: [id number]       SSN: [id number]  YOB: 1950        AGE: 73 y.o.        SEX: female  Body mass index is 24.55 kg/m².    PCP: Wendy Gillette PA  24    CHIEF COMPLAINT: Left shoulder pain    HPI: Shira Norwood is a 73 y.o. female patient who complains of several weeks of left shoulder pain.  Pain is 7 out of 10.  Pain is worse with abduction.  Some pain at night.  She does take over-the-counter anti-inflammatories which has minimally helped with the pain.  No injury.  No surgery.    Past Medical History:   Diagnosis Date    Anxiety disorder     Arthritis     Basal joint arthritis, left thumb    GERD (gastroesophageal reflux disease)     Headache(784.0)     Hearing reduced     Left knee pain     Lower back pain 2010    Menopause     Age 45    Osteoarthritis of left knee     Pain of left thumb     Primary localized osteoarthritis of left knee 2020    Psychiatric disorder     depression    Sacroiliitis (HCC) 9/15/2010    SI (sacroiliac) joint dysfunction     Sinus trouble     Ulcer 2006    polyp, positive heme occult stools, denies ulcer       Family History   Problem Relation Age of Onset    Heart Failure Mother          at age 83 of congestive heart failure    Cancer Father          at age 55 of lung cancer; smoker and asbestos exposure    Heart Disease Mother        Current Outpatient Medications   Medication Sig Dispense Refill    tiZANidine (ZANAFLEX) 2 MG tablet Take 1 tablet by mouth nightly as needed (spams) 30 tablet 0    omeprazole (PRILOSEC) 20 MG delayed release capsule Take 1 capsule by mouth daily      vitamin C (ASCORBIC ACID) 500 MG tablet Take 1 tablet by mouth daily      calcium carbonate 1500 (600 Ca) MG TABS tablet Take 2 tablets by mouth Daily      fludrocortisone (FLORINEF) 0.1 MG tablet Take 1 tablet by mouth daily 90 tablet 3    buPROPion

## 2024-05-28 NOTE — PROGRESS NOTES
Instructions for your procedure at [facility]      Today's Date: 5/28/2024      Patient's Name: Shira Norwood      Procedure Date: 6/7        Please enter the main entrance of the hospital and check-in at the  located in the lobby.      Do NOT eat or drink anything, including candy, gum, or ice chips after midnight prior to your procedure, unless it is part of your prep.  Brush your teeth before coming to the hospital.You may swish with water, but do not swallow.  No smoking/Vaping/E-Cigarettes 24 hours prior to the day of procedure.  No alcohol 24 hours prior to the day of procedure.  No recreational drugs for one week prior to the day of procedure.  Bring Photo ID, Insurance information, and Co-pay if required on day of procedure.  Bring in pertinent legal documents, such as, Medical Power of , DNR, Advance Directive, etc.  Leave all other valuables, including money/purse, at home.  Remove jewelry, including ALL body piercings, nail polish, acrylic nails, and makeup (including mascara); no lotions, powders, deodorant, and/or perfume/cologne/after shave on the skin.  Glasses and dentures may be worn to the hospital.  They must be removed prior to procedure. Please bring case/container for glasses or dentures.  11. Contacts should not be worn on day of procedure.   12. Call the office ([phone]) if you have symptoms of a cold or illness within 24-48 hours prior to your procedure.   13. AN ADULT (relative or friend 18 years or older) MUST DRIVE YOU HOME AFTER YOUR PROCEDURE.   14. Please make arrangements for a responsible adult (18 years or older) to be with you for 24 hours after your procedure.   15. ONE VISITOR will be allowed in the waiting area during your procedure.       Special Instructions:      Bring list of CURRENT medications.  Follow instructions from the office regarding Bowel Prep, Vitamins, Iron, Blood Thinners, Insulin, Seizure, and Blood

## 2024-06-06 ENCOUNTER — ANESTHESIA EVENT (OUTPATIENT)
Facility: HOSPITAL | Age: 74
End: 2024-06-06
Payer: MEDICARE

## 2024-06-07 ENCOUNTER — ANESTHESIA (OUTPATIENT)
Facility: HOSPITAL | Age: 74
End: 2024-06-07
Payer: MEDICARE

## 2024-06-07 ENCOUNTER — HOSPITAL ENCOUNTER (OUTPATIENT)
Facility: HOSPITAL | Age: 74
Setting detail: OUTPATIENT SURGERY
Discharge: HOME | End: 2024-06-07
Attending: INTERNAL MEDICINE | Admitting: INTERNAL MEDICINE
Payer: MEDICARE

## 2024-06-07 VITALS
SYSTOLIC BLOOD PRESSURE: 127 MMHG | HEART RATE: 70 BPM | OXYGEN SATURATION: 98 % | BODY MASS INDEX: 23.2 KG/M2 | TEMPERATURE: 98.5 F | DIASTOLIC BLOOD PRESSURE: 67 MMHG | RESPIRATION RATE: 20 BRPM | HEIGHT: 64 IN | WEIGHT: 135.9 LBS

## 2024-06-07 PROCEDURE — 3700000001 HC ADD 15 MINUTES (ANESTHESIA): Performed by: INTERNAL MEDICINE

## 2024-06-07 PROCEDURE — 3700000000 HC ANESTHESIA ATTENDED CARE: Performed by: INTERNAL MEDICINE

## 2024-06-07 PROCEDURE — 6360000002 HC RX W HCPCS: Performed by: NURSE ANESTHETIST, CERTIFIED REGISTERED

## 2024-06-07 PROCEDURE — 2709999900 HC NON-CHARGEABLE SUPPLY: Performed by: INTERNAL MEDICINE

## 2024-06-07 PROCEDURE — 3600007513: Performed by: INTERNAL MEDICINE

## 2024-06-07 PROCEDURE — 2580000003 HC RX 258: Performed by: NURSE ANESTHETIST, CERTIFIED REGISTERED

## 2024-06-07 PROCEDURE — 7100000010 HC PHASE II RECOVERY - FIRST 15 MIN: Performed by: INTERNAL MEDICINE

## 2024-06-07 PROCEDURE — 88305 TISSUE EXAM BY PATHOLOGIST: CPT

## 2024-06-07 PROCEDURE — 2500000003 HC RX 250 WO HCPCS: Performed by: NURSE ANESTHETIST, CERTIFIED REGISTERED

## 2024-06-07 PROCEDURE — 3600007503: Performed by: INTERNAL MEDICINE

## 2024-06-07 PROCEDURE — 7100000000 HC PACU RECOVERY - FIRST 15 MIN: Performed by: INTERNAL MEDICINE

## 2024-06-07 RX ORDER — LIDOCAINE HYDROCHLORIDE 10 MG/ML
1 INJECTION, SOLUTION EPIDURAL; INFILTRATION; INTRACAUDAL; PERINEURAL
Status: DISCONTINUED | OUTPATIENT
Start: 2024-06-07 | End: 2024-06-07 | Stop reason: HOSPADM

## 2024-06-07 RX ORDER — LIDOCAINE HYDROCHLORIDE 20 MG/ML
INJECTION, SOLUTION EPIDURAL; INFILTRATION; INTRACAUDAL; PERINEURAL PRN
Status: DISCONTINUED | OUTPATIENT
Start: 2024-06-07 | End: 2024-06-07 | Stop reason: SDUPTHER

## 2024-06-07 RX ORDER — SODIUM CHLORIDE, SODIUM LACTATE, POTASSIUM CHLORIDE, CALCIUM CHLORIDE 600; 310; 30; 20 MG/100ML; MG/100ML; MG/100ML; MG/100ML
INJECTION, SOLUTION INTRAVENOUS CONTINUOUS
Status: DISCONTINUED | OUTPATIENT
Start: 2024-06-07 | End: 2024-06-07 | Stop reason: HOSPADM

## 2024-06-07 RX ORDER — SODIUM CHLORIDE 0.9 % (FLUSH) 0.9 %
5-40 SYRINGE (ML) INJECTION PRN
Status: DISCONTINUED | OUTPATIENT
Start: 2024-06-07 | End: 2024-06-07 | Stop reason: HOSPADM

## 2024-06-07 RX ORDER — PROPOFOL 10 MG/ML
INJECTION, EMULSION INTRAVENOUS PRN
Status: DISCONTINUED | OUTPATIENT
Start: 2024-06-07 | End: 2024-06-07 | Stop reason: SDUPTHER

## 2024-06-07 RX ADMIN — PROPOFOL 20 MG: 10 INJECTION, EMULSION INTRAVENOUS at 12:42

## 2024-06-07 RX ADMIN — PROPOFOL 30 MG: 10 INJECTION, EMULSION INTRAVENOUS at 12:47

## 2024-06-07 RX ADMIN — PROPOFOL 50 MG: 10 INJECTION, EMULSION INTRAVENOUS at 12:53

## 2024-06-07 RX ADMIN — LIDOCAINE HYDROCHLORIDE 40 MG: 20 INJECTION, SOLUTION EPIDURAL; INFILTRATION; INTRACAUDAL; PERINEURAL at 12:38

## 2024-06-07 RX ADMIN — PROPOFOL 20 MG: 10 INJECTION, EMULSION INTRAVENOUS at 12:40

## 2024-06-07 RX ADMIN — PROPOFOL 20 MG: 10 INJECTION, EMULSION INTRAVENOUS at 12:44

## 2024-06-07 RX ADMIN — PROPOFOL 40 MG: 10 INJECTION, EMULSION INTRAVENOUS at 12:49

## 2024-06-07 RX ADMIN — SODIUM CHLORIDE, POTASSIUM CHLORIDE, SODIUM LACTATE AND CALCIUM CHLORIDE: 600; 310; 30; 20 INJECTION, SOLUTION INTRAVENOUS at 11:19

## 2024-06-07 RX ADMIN — PROPOFOL 70 MG: 10 INJECTION, EMULSION INTRAVENOUS at 12:38

## 2024-06-07 ASSESSMENT — PAIN - FUNCTIONAL ASSESSMENT
PAIN_FUNCTIONAL_ASSESSMENT: 0-10
PAIN_FUNCTIONAL_ASSESSMENT: 0-10

## 2024-06-07 ASSESSMENT — PAIN SCALES - GENERAL
PAINLEVEL_OUTOF10: 0
PAINLEVEL_OUTOF10: 0

## 2024-06-07 NOTE — ANESTHESIA PRE PROCEDURE
Department of Anesthesiology  Preprocedure Note       Name:  Shira Norwood   Age:  73 y.o.  :  1950                                          MRN:  [id number]         Date:  2024      Surgeon: Surgeon(s):  Almas Joseph MD    Procedure: Procedure(s):  ESOPHAGOGASTRODUODENOSCOPY DILATION  COLONOSCOPY    Medications prior to admission:   Prior to Admission medications    Medication Sig Start Date End Date Taking? Authorizing Provider   omeprazole (PRILOSEC) 20 MG delayed release capsule Take 1 capsule by mouth daily 23   Derrick Timmons MD   vitamin C (ASCORBIC ACID) 500 MG tablet Take 1 tablet by mouth daily    Derrick Timmons MD   calcium carbonate 1500 (600 Ca) MG TABS tablet Take 2 tablets by mouth Daily    Derrick Timmons MD   fludrocortisone (FLORINEF) 0.1 MG tablet Take 1 tablet by mouth daily 23   Cee Bishop MD   buPROPion (WELLBUTRIN SR) 150 MG extended release tablet Take 1 tablet by mouth daily    Automatic Reconciliation, Ar   cyanocobalamin 500 MCG tablet Take 1 tablet by mouth daily    Automatic Reconciliation, Ar   FLUoxetine HCl 60 MG TABS Take 60 mg by mouth daily    Automatic Reconciliation, Ar   rosuvastatin (CRESTOR) 10 MG tablet Take 1 tablet by mouth daily    Automatic Reconciliation, Ar   traZODone (DESYREL) 100 MG tablet Take 1 tablet by mouth nightly    Automatic Reconciliation, Ar       Current medications:    Current Facility-Administered Medications   Medication Dose Route Frequency Provider Last Rate Last Admin   • lidocaine PF 1 % injection 1 mL  1 mL IntraDERmal Once PRN Art Olvera APRN - CRNA       • lactated ringers IV soln infusion   IntraVENous Continuous Art Olvera APRN - CRNA       • sodium chloride flush 0.9 % injection 5-40 mL  5-40 mL IntraVENous PRN Art Olvera APRN - CRNA       • famotidine (PEPCID) 20 mg in sodium chloride (PF) 0.9 % 10 mL injection  20 mg IntraVENous Once Art Olvera APRN - CRNA

## 2024-06-07 NOTE — DISCHARGE INSTRUCTIONS
operate machinery until the medicine wears off and you can think clearly. Your doctor may tell you not to drive or operate machinery until the day after your test.     Do not sign legal documents or make major decisions until the medicine wears off and you can think clearly. The anesthesia can make it hard for you to fully understand what you are agreeing to.   Follow-up care is a key part of your treatment and safety. Be sure to make and go to all appointments, and call your doctor if you are having problems. It's also a good idea to know your test results and keep a list of the medicines you take.  When should you call for help?   Call 911 anytime you think you may need emergency care. For example, call if:    You passed out (lost consciousness).     You pass maroon or bloody stools.     You have trouble breathing.   Call your doctor now or seek immediate medical care if:    You have pain that does not get better after you take pain medicine.     You are sick to your stomach or cannot drink fluids.     You have new or worse belly pain.     You have blood in your stools.     You have a fever.     You cannot pass stools or gas.   Watch closely for changes in your health, and be sure to contact your doctor if you have any problems.  Where can you learn more?  Go to https://www.[employer].net/patientEd and enter E264 to learn more about \"Colonoscopy: What to Expect at Home.\"  Current as of: October 25, 2023  Content Version: 14.1  © 2006-2024 [employer].   Care instructions adapted under license by [employer]. If you have questions about a medical condition or this instruction, always ask your healthcare professional. [employer] disclaims any warranty or liability for your use of this information.         Colon Polyps: Care Instructions  Your Care Instructions     Colon polyps are growths in the colon or the rectum. The cause of most colon polyps is not known, and most people who get them  do not have any problems. But a certain kind can turn into cancer. For this reason, regular testing for colon polyps is important for people as they get older. It is also important for anyone who has an increased risk for colon cancer.  Polyps are usually found through routine colon cancer screening tests. Although most colon polyps are not cancerous, they are usually removed and then tested for cancer. Screening for colon cancer saves lives because the cancer can usually be cured if it is caught early.  If you have a polyp that is the type that can turn into cancer, you may need more tests to examine your entire colon. The doctor will remove any other polyps that are found, and you will be tested more often.  Follow-up care is a key part of your treatment and safety. Be sure to make and go to all appointments, and call your doctor if you are having problems. It's also a good idea to know your test results and keep a list of the medicines you take.  How can you care for yourself at home?  Regular exams to look for colon polyps are the best way to prevent polyps from turning into colon cancer. These can include stool tests, sigmoidoscopy, colonoscopy, and CT colonography. Talk with your doctor about a testing schedule that is right for you.  To prevent polyps  There is no home treatment that can prevent colon polyps. But these steps may help lower your risk for cancer.  Stay active. Being active can help you get to and stay at a healthy weight. Try to exercise on most days of the week. Walking is a good choice.  Eat well. Choose a variety of vegetables, fruits, legumes (such as peas and beans), fish, poultry, and whole grains.  Do not smoke. If you need help quitting, talk to your doctor about stop-smoking programs and medicines. These can increase your chances of quitting for good.  If you drink alcohol, limit how much you drink. Limit alcohol to 2 drinks a day for men and 1 drink a day for women.  When should you

## 2024-06-07 NOTE — H&P (VIEW-ONLY)
[facility]  [address] [place] [address], [address]  [place] [address]  Phone: [phone]  Fax: [phone]        Shira Norwood  : 1950  PCP: Wendy Gillette PA    RFA EVALUATION      ASSESSMENT AND PLAN    Shira was seen today for lower back pain.    Diagnoses and all orders for this visit:    Facet arthritis, degenerative, lumbar spine  -     Ambulatory Referral to Ortho Injection         Shira Norwood is a 73 y.o. female with chronic daily activity limiting axial low back pain.  No radiculopathy.  Educated pt on MBB/RFA process and discussed risks and benefits, as well as alternatives and limitations.  Schedule for MBB L3-L4, L4-L5 x2.   Pt given physician-directed HEP and information on MBB/RFA.  Maintain HEP.             HISTORY OF PRESENT ILLNESS    Shira Norwood is seen today in consultation for MBB/RFA. Patient was referred by Dr. Villagran. Notes will be sent to referring provider and PCP Wendy Gillette PA.  Last seen by Dr. Villagran (24), Consult for MBB, refilled tizanidine 2mg QHS.    Pt c/o lumbar pain radiating up the back. Pt denies any radicular symptoms into legs. Pt notes limited walking tolerance of 10 minutes. Pt leans over when shopping and washing dishes. Pt notes she has custody of 4 year old great grand child. Pt continues with Tizanidine with benefit.    Patient not on chronic anticoagulation, not diabetic.         6/10/2024     1:04 PM   AMB PAIN ASSESSMENT   Location of Pain Back   Severity of Pain 5   Quality of Pain Sharp   Duration of Pain Persistent   Frequency of Pain Constant   Aggravating Factors Other (Comment)   Limiting Behavior Yes   Relieving Factors Other (Comment)   Result of Injury No   Work-Related Injury No   Are there other pain locations you wish to document? No         Onset of pain: >10 years      Investigations:   L MRI 4/3/24: Moderate FA L2-5. Moderate left foraminal narrowing L2-3.  Disc bulges L2/3/4.  L

## 2024-06-07 NOTE — PROGRESS NOTES
[facility]  [address] [place] [address], [address]  [place] [address]  Phone: [phone]  Fax: [phone]        Shira Norwood  : 1950  PCP: Wendy Gillette PA    RFA EVALUATION      ASSESSMENT AND PLAN    Shira was seen today for lower back pain.    Diagnoses and all orders for this visit:    Facet arthritis, degenerative, lumbar spine  -     Ambulatory Referral to Ortho Injection         Shira Norwood is a 73 y.o. female with chronic daily activity limiting axial low back pain.  No radiculopathy.  Educated pt on MBB/RFA process and discussed risks and benefits, as well as alternatives and limitations.  Schedule for MBB L3-L4, L4-L5 x2.   Pt given physician-directed HEP and information on MBB/RFA.  Maintain HEP.             HISTORY OF PRESENT ILLNESS    Shira Norwood is seen today in consultation for MBB/RFA. Patient was referred by Dr. Villagran. Notes will be sent to referring provider and PCP Wendy Gillette PA.  Last seen by Dr. Villagran (24), Consult for MBB, refilled tizanidine 2mg QHS.    Pt c/o lumbar pain radiating up the back. Pt denies any radicular symptoms into legs. Pt notes limited walking tolerance of 10 minutes. Pt leans over when shopping and washing dishes. Pt notes she has custody of 4 year old great grand child. Pt continues with Tizanidine with benefit.    Patient not on chronic anticoagulation, not diabetic.         6/10/2024     1:04 PM   AMB PAIN ASSESSMENT   Location of Pain Back   Severity of Pain 5   Quality of Pain Sharp   Duration of Pain Persistent   Frequency of Pain Constant   Aggravating Factors Other (Comment)   Limiting Behavior Yes   Relieving Factors Other (Comment)   Result of Injury No   Work-Related Injury No   Are there other pain locations you wish to document? No         Onset of pain: >10 years      Investigations:   L MRI 4/3/24: Moderate FA L2-5. Moderate left foraminal narrowing L2-3.  Disc bulges L2/3/4.  L

## 2024-06-07 NOTE — H&P
[phone]    GASTROENTEROLOGY Pre-Procedure H and P      Impression/Plan:   1. This patient is consented for an EGD and colonoscopy for dysphagia and hx polyps       Chief Complaint: dysphagia and hx polyps     HPI:  Shira Norwood is a 73 y.o. female who is having an EGD and colonoscopy for dysphagia and hx polyps     PMH:   Past Medical History:   Diagnosis Date    Anxiety disorder     Arthritis     Basal joint arthritis, left thumb    GERD (gastroesophageal reflux disease)     Headache(784.0)     Hearing reduced     Left knee pain     Lower back pain July 2010    Menopause     Age 45    Osteoarthritis of left knee     Pain of left thumb     Primary localized osteoarthritis of left knee 8/17/2020    Psychiatric disorder     depression    Sacroiliitis (HCC) 9/15/2010    SI (sacroiliac) joint dysfunction     Sinus trouble     Ulcer 2006    polyp, positive heme occult stools, denies ulcer       PSH:   Past Surgical History:   Procedure Laterality Date    COLONOSCOPY      DILATION AND CURETTAGE OF UTERUS      ORTHOPEDIC SURGERY  9/15/2010    Left SI joint injection 30mg Celestone and 4 cc Lidocaine    TUBAL LIGATION         Social HX:   Social History     Socioeconomic History    Marital status:      Spouse name: Not on file    Number of children: Not on file    Years of education: Not on file    Highest education level: Not on file   Occupational History    Not on file   Tobacco Use    Smoking status: Never    Smokeless tobacco: Never    Tobacco comments:      SMOKED   Substance and Sexual Activity    Alcohol use: No     Alcohol/week: 0.0 standard drinks of alcohol    Drug use: No    Sexual activity: Not on file     Comment: BTL   Other Topics Concern    Not on file   Social History Narrative    Not on file     Social Determinants of Health     Financial Resource Strain: Not on file   Food Insecurity: Not on file   Transportation Needs: Not on file   Physical Activity: Not on file   Stress: Not

## 2024-06-07 NOTE — ANESTHESIA POSTPROCEDURE EVALUATION
Department of Anesthesiology  Postprocedure Note    Patient: Shira Norwood  MRN: [id number]  YOB: 1950  Date of evaluation: 6/7/2024    Procedure Summary       Date: 06/07/24 Room / Location: [facility] ENDO 02 / [facility] ENDOSCOPY    Anesthesia Start: 1232 Anesthesia Stop: 1258    Procedures:       ESOPHAGOGASTRODUODENOSCOPY  54 FR DILATION (Upper GI Region)      COLONOSCOPY  with polypectomy (Abdomen) Diagnosis:       Gastroesophageal reflux disease, unspecified whether esophagitis present      Upper abdominal pain      Dysphagia, unspecified type      Constipation, unspecified constipation type      Personal history of colonic polyps      (Gastroesophageal reflux disease, unspecified whether esophagitis present [K21.9])      (Upper abdominal pain [R10.10])      (Dysphagia, unspecified type [R13.10])      (Constipation, unspecified constipation type [K59.00])      (Personal history of colonic polyps [Z86.010])    Surgeons: Almas Joseph MD Responsible Provider: Julio Cesar Astorga MD    Anesthesia Type: MAC ASA Status: 2            Anesthesia Type: MAC    Rubén Phase I: Rubén Score: 9    Rubén Phase II: Rubén Score: 10    Anesthesia Post Evaluation    Patient location during evaluation: bedside  Patient participation: complete - patient participated  Level of consciousness: responsive to verbal stimuli  Airway patency: patent  Nausea & Vomiting: no nausea  Respiratory status: acceptable  Hydration status: euvolemic    No notable events documented.

## 2024-06-10 ENCOUNTER — OFFICE VISIT (OUTPATIENT)
Age: 74
End: 2024-06-10
Payer: MEDICARE

## 2024-06-10 VITALS
WEIGHT: 142 LBS | BODY MASS INDEX: 24.24 KG/M2 | SYSTOLIC BLOOD PRESSURE: 120 MMHG | DIASTOLIC BLOOD PRESSURE: 62 MMHG | HEIGHT: 64 IN | HEART RATE: 82 BPM | OXYGEN SATURATION: 98 %

## 2024-06-10 DIAGNOSIS — M47.816 FACET ARTHRITIS, DEGENERATIVE, LUMBAR SPINE: Primary | ICD-10-CM

## 2024-06-10 PROCEDURE — 99212 OFFICE O/P EST SF 10 MIN: CPT | Performed by: PHYSICAL MEDICINE & REHABILITATION

## 2024-06-10 PROCEDURE — 1090F PRES/ABSN URINE INCON ASSESS: CPT | Performed by: PHYSICAL MEDICINE & REHABILITATION

## 2024-06-10 PROCEDURE — G8420 CALC BMI NORM PARAMETERS: HCPCS | Performed by: PHYSICAL MEDICINE & REHABILITATION

## 2024-06-10 PROCEDURE — 1123F ACP DISCUSS/DSCN MKR DOCD: CPT | Performed by: PHYSICAL MEDICINE & REHABILITATION

## 2024-06-10 PROCEDURE — G8427 DOCREV CUR MEDS BY ELIG CLIN: HCPCS | Performed by: PHYSICAL MEDICINE & REHABILITATION

## 2024-06-10 PROCEDURE — 3017F COLORECTAL CA SCREEN DOC REV: CPT | Performed by: PHYSICAL MEDICINE & REHABILITATION

## 2024-06-10 PROCEDURE — 1036F TOBACCO NON-USER: CPT | Performed by: PHYSICAL MEDICINE & REHABILITATION

## 2024-06-10 PROCEDURE — G8399 PT W/DXA RESULTS DOCUMENT: HCPCS | Performed by: PHYSICAL MEDICINE & REHABILITATION

## 2024-06-10 NOTE — PROGRESS NOTES
Shira Norwood presents today for   Chief Complaint   Patient presents with    Lower Back Pain       Is someone accompanying this pt? no    Is the patient using any DME equipment during OV? no      Coordination of Care:  1. Have you been to the ER, urgent care clinic since your last visit? no  Hospitalized since your last visit? no    2. Have you seen or consulted any other health care providers outside of the [facility] since your last visit? Yes, pcp  Include any pap smears or colon screening. no

## 2024-06-10 NOTE — PATIENT INSTRUCTIONS
in line with your body, and do not press your chin to your chest.  Hold this position for 1 or 2 seconds, then slowly lower yourself back down to the floor.  Repeat 8 to 12 times.  Pelvic tilt    Lie on your back with your knees bent and your feet flat on the floor.  Tighten your belly muscles and buttocks, and press your lower back to the floor. You should feel your hips and pelvis rock back.  Hold for about 6 seconds while breathing smoothly, and then relax.  Repeat 8 to 12 times.  Heel dig bridging    Lie on your back with both knees bent and your ankles bent so that only your heels are digging into the floor. Your knees should be bent about 90 degrees.  Then push your heels into the floor, squeeze your buttocks, and lift your hips off the floor until your shoulders, hips, and knees are all in a straight line.  Hold for about 6 seconds as you continue to breathe normally, and then slowly lower your hips back down to the floor and rest for up to 10 seconds.  Do 8 to 12 repetitions.  Hamstring stretch in doorway    Lie on your back in a doorway, with one leg through the open door.  Slide your leg up the wall to straighten your knee. You should feel a gentle stretch down the back of your leg.  Hold the stretch for at least 15 to 30 seconds. Do not arch your back, point your toes, or bend either knee. Keep one heel touching the floor and the other heel touching the wall.  Repeat with your other leg.  Do 2 to 4 times for each leg.  Hip flexor stretch    Kneel on the floor with one knee bent and one leg behind you. Place your forward knee over your foot. Keep your other knee touching the floor.  Slowly push your hips forward until you feel a stretch in the upper thigh of your rear leg.  Hold the stretch for at least 15 to 30 seconds. Repeat with your other leg.  Do 2 to 4 times on each side.  Wall sit    Stand with your back 10 to 12 inches away from a wall.  Lean into the wall until your back is flat against

## 2024-06-20 RX ORDER — ROPIVACAINE HYDROCHLORIDE 2 MG/ML
10 INJECTION, SOLUTION EPIDURAL; INFILTRATION; PERINEURAL ONCE
OUTPATIENT
Start: 2024-06-25

## 2024-06-24 ENCOUNTER — OFFICE VISIT (OUTPATIENT)
Age: 74
End: 2024-06-24

## 2024-06-24 VITALS
BODY MASS INDEX: 27.46 KG/M2 | OXYGEN SATURATION: 96 % | WEIGHT: 160 LBS | SYSTOLIC BLOOD PRESSURE: 123 MMHG | DIASTOLIC BLOOD PRESSURE: 63 MMHG | HEART RATE: 83 BPM

## 2024-06-24 DIAGNOSIS — E78.00 PURE HYPERCHOLESTEROLEMIA: ICD-10-CM

## 2024-06-24 DIAGNOSIS — R55 NEUROCARDIOGENIC SYNCOPE: Primary | ICD-10-CM

## 2024-06-24 DIAGNOSIS — I25.84 CORONARY ATHEROSCLEROSIS DUE TO CALCIFIED CORONARY LESION: ICD-10-CM

## 2024-06-24 DIAGNOSIS — I25.10 CORONARY ATHEROSCLEROSIS DUE TO CALCIFIED CORONARY LESION: ICD-10-CM

## 2024-06-24 ASSESSMENT — ENCOUNTER SYMPTOMS
RESPIRATORY NEGATIVE: 1
EYES NEGATIVE: 1
GASTROINTESTINAL NEGATIVE: 1

## 2024-06-24 NOTE — PROGRESS NOTES
1. Have you been to the ER, urgent care clinic since your last visit?  Hospitalized since your last visit?  no       2.  Where do you normally have your labs drawn?       3. Do you need any refills today?   no    4. Which local pharmacy do you use (enter pharmacy)?   [facility]    5. Which mail order pharmacy do you use (enter pharmacy)?        6. Are you here for surgical clearance and if so who will be doing your     procedure/surgery (care team)?   no      
normal. Normal wall thickness. Normal wall motion. Diastolic dysfunction present with normal LV EF.    Mitral Valve: Mild regurgitation.    Tricuspid Valve: Mild regurgitation. The estimated PASP is 28 mmHg.    Interatrial Septum: No PFO present visible by color Doppler.    8/2022 tilt table  Hypotension with blood pressure of 43/26 with unresponsiveness.  Pulse reported at 78.  Likely autonomic dysfunction    CARDIOLOGY REPORT: 7/2022     The patient underwent a limited noncontrast CT scan of her chest with cardiac  gating to evaluate for coronary arterial calcification. Using the Agatston  method the coronary calcium score was calculated. There is a small amount of  coronary calcification present in her left anterior descending artery and her  right coronary artery. Coronary calcium score calculated to be 30.     IMPRESSION.     Coronary calcium score 30.      ASSESSMENT & PLAN    Lab Results   Component Value Date    CHOL 161 07/06/2022    CHOL 208 (H) 05/15/2020     Lab Results   Component Value Date    TRIG 86 07/06/2022    TRIG 62 05/15/2020     Lab Results   Component Value Date    HDL 88 (H) 07/06/2022    HDL 82 (H) 05/15/2020     Lab Results   Component Value Date    LDL 55.8 07/06/2022    .6 (H) 05/15/2020     Lab Results   Component Value Date    VLDL 17.2 07/06/2022    VLDL 12.4 05/15/2020       Pertinent laboratory and test data reviewed and discussed with patient.        6/16/2022  Seen for new onset progressive dizziness.  Could be related to medication.  Set up tilt table to evaluate for orthostatic changes.  Does not appear arrhythmic in nature get echo for LV function get CT coronary calcium score and decide on statin intensity and aspirin.  Continue with hydration and support stocking  8/2022 positive tilt table testing with vasodepressor syncope.  Add midodrine  Mild coronary calcification.  LDL 55 continue current dose of statin.  6/2023  Cardiac status stable.  Continue current medical

## 2024-06-25 ENCOUNTER — HOSPITAL ENCOUNTER (OUTPATIENT)
Facility: HOSPITAL | Age: 74
End: 2024-06-25
Payer: MEDICARE

## 2024-06-25 ENCOUNTER — HOSPITAL ENCOUNTER (OUTPATIENT)
Facility: HOSPITAL | Age: 74
Discharge: HOME | End: 2024-06-28
Payer: MEDICARE

## 2024-06-25 DIAGNOSIS — I25.84 CORONARY ATHEROSCLEROSIS DUE TO CALCIFIED CORONARY LESION: ICD-10-CM

## 2024-06-25 DIAGNOSIS — E78.00 PURE HYPERCHOLESTEROLEMIA: ICD-10-CM

## 2024-06-25 DIAGNOSIS — I25.10 CORONARY ATHEROSCLEROSIS DUE TO CALCIFIED CORONARY LESION: ICD-10-CM

## 2024-06-25 LAB
ALBUMIN SERPL-MCNC: 3.5 G/DL (ref 3.4–5)
ALBUMIN/GLOB SERPL: 1 (ref 0.8–1.7)
ALP SERPL-CCNC: 107 U/L (ref 45–117)
ALT SERPL-CCNC: 25 U/L (ref 13–56)
ANION GAP SERPL CALC-SCNC: 5 MMOL/L (ref 3–18)
AST SERPL-CCNC: 18 U/L (ref 10–38)
BILIRUB DIRECT SERPL-MCNC: <0.1 MG/DL (ref 0–0.2)
BILIRUB SERPL-MCNC: 0.4 MG/DL (ref 0.2–1)
BUN SERPL-MCNC: 18 MG/DL (ref 7–18)
BUN/CREAT SERPL: 24 (ref 12–20)
CALCIUM SERPL-MCNC: 9.2 MG/DL (ref 8.5–10.1)
CHLORIDE SERPL-SCNC: 110 MMOL/L (ref 100–111)
CHOLEST SERPL-MCNC: 172 MG/DL
CO2 SERPL-SCNC: 29 MMOL/L (ref 21–32)
CREAT SERPL-MCNC: 0.75 MG/DL (ref 0.6–1.3)
GLOBULIN SER CALC-MCNC: 3.4 G/DL (ref 2–4)
GLUCOSE SERPL-MCNC: 83 MG/DL (ref 74–99)
HDLC SERPL-MCNC: 85 MG/DL (ref 40–60)
HDLC SERPL: 2 (ref 0–5)
LDLC SERPL CALC-MCNC: 73.4 MG/DL (ref 0–100)
LIPID PANEL: ABNORMAL
POTASSIUM SERPL-SCNC: 4.1 MMOL/L (ref 3.5–5.5)
PROT SERPL-MCNC: 6.9 G/DL (ref 6.4–8.2)
SODIUM SERPL-SCNC: 144 MMOL/L (ref 136–145)
TRIGL SERPL-MCNC: 68 MG/DL
VLDLC SERPL CALC-MCNC: 13.6 MG/DL

## 2024-06-25 PROCEDURE — 80048 BASIC METABOLIC PNL TOTAL CA: CPT

## 2024-06-25 PROCEDURE — 36415 COLL VENOUS BLD VENIPUNCTURE: CPT

## 2024-06-25 PROCEDURE — 80061 LIPID PANEL: CPT

## 2024-06-25 PROCEDURE — 80076 HEPATIC FUNCTION PANEL: CPT

## 2024-07-02 ENCOUNTER — HOSPITAL ENCOUNTER (EMERGENCY)
Facility: HOSPITAL | Age: 74
Discharge: HOME | End: 2024-07-02
Payer: MEDICARE

## 2024-07-02 ENCOUNTER — APPOINTMENT (OUTPATIENT)
Facility: HOSPITAL | Age: 74
End: 2024-07-02
Payer: MEDICARE

## 2024-07-02 VITALS
SYSTOLIC BLOOD PRESSURE: 121 MMHG | WEIGHT: 140 LBS | OXYGEN SATURATION: 99 % | HEART RATE: 78 BPM | BODY MASS INDEX: 23.9 KG/M2 | DIASTOLIC BLOOD PRESSURE: 66 MMHG | RESPIRATION RATE: 18 BRPM | HEIGHT: 64 IN | TEMPERATURE: 97.5 F

## 2024-07-02 DIAGNOSIS — S93.402A SPRAIN OF LEFT ANKLE, UNSPECIFIED LIGAMENT, INITIAL ENCOUNTER: Primary | ICD-10-CM

## 2024-07-02 PROCEDURE — 73630 X-RAY EXAM OF FOOT: CPT

## 2024-07-02 PROCEDURE — 99283 EMERGENCY DEPT VISIT LOW MDM: CPT

## 2024-07-02 PROCEDURE — 73610 X-RAY EXAM OF ANKLE: CPT

## 2024-07-02 ASSESSMENT — PAIN DESCRIPTION - ORIENTATION: ORIENTATION: LEFT

## 2024-07-02 ASSESSMENT — PAIN SCALES - GENERAL: PAINLEVEL_OUTOF10: 10

## 2024-07-02 ASSESSMENT — PAIN - FUNCTIONAL ASSESSMENT: PAIN_FUNCTIONAL_ASSESSMENT: 0-10

## 2024-07-02 ASSESSMENT — ENCOUNTER SYMPTOMS
SHORTNESS OF BREATH: 0
SORE THROAT: 0
VOMITING: 0
ABDOMINAL PAIN: 0
DIARRHEA: 0

## 2024-07-02 ASSESSMENT — PAIN DESCRIPTION - LOCATION: LOCATION: FOOT

## 2024-07-02 ASSESSMENT — LIFESTYLE VARIABLES
HOW MANY STANDARD DRINKS CONTAINING ALCOHOL DO YOU HAVE ON A TYPICAL DAY: PATIENT DOES NOT DRINK
HOW OFTEN DO YOU HAVE A DRINK CONTAINING ALCOHOL: NEVER

## 2024-07-02 NOTE — ED TRIAGE NOTES
Pt states that left foot rolled over water hose yesterday when outside. Now hurts to bear weight to left foot. Ambulatory with limp at triage

## 2024-07-02 NOTE — ED PROVIDER NOTES
EMERGENCY DEPARTMENT HISTORY AND PHYSICAL EXAM      Date: 7/2/2024  Patient Name: Shira Norwood    History of Presenting Illness     Chief Complaint   Patient presents with    Foot Pain       History (Context): Shira Norwood is a 73 y.o. female with significant past medical history of depression, GERD, anxiety presents ambulatory to the ED today. Patient reports she rolled her left foot yesterday when she was in the yard. Denies falling and hitting head. Patient reports increased pain with walking.  Denies numbness, tingling, radiating pain, weakness.  Pain has not taken anything for symptoms      PCP: Wendy Gillette PA    No current facility-administered medications for this encounter.     Current Outpatient Medications   Medication Sig Dispense Refill    omeprazole (PRILOSEC) 20 MG delayed release capsule Take 1 capsule by mouth daily      vitamin C (ASCORBIC ACID) 500 MG tablet Take 1 tablet by mouth daily      calcium carbonate 1500 (600 Ca) MG TABS tablet Take 2 tablets by mouth Daily      fludrocortisone (FLORINEF) 0.1 MG tablet Take 1 tablet by mouth daily 90 tablet 3    buPROPion (WELLBUTRIN SR) 150 MG extended release tablet Take 1 tablet by mouth daily      cyanocobalamin 500 MCG tablet Take 1 tablet by mouth daily      FLUoxetine HCl 60 MG TABS Take 60 mg by mouth daily      rosuvastatin (CRESTOR) 10 MG tablet Take 1 tablet by mouth daily      traZODone (DESYREL) 100 MG tablet Take 1 tablet by mouth nightly         Past History     Past Medical History:   Past Medical History:   Diagnosis Date    Anxiety disorder     Arthritis     Basal joint arthritis, left thumb    GERD (gastroesophageal reflux disease)     Headache(784.0)     Hearing reduced     Left knee pain     Lower back pain July 2010    Menopause     Age 45    Osteoarthritis of left knee     Pain of left thumb     Primary localized osteoarthritis of left knee 8/17/2020    Psychiatric disorder     depression    Sacroiliitis (HCC) 9/15/2010

## 2024-07-09 ENCOUNTER — HOSPITAL ENCOUNTER (OUTPATIENT)
Facility: HOSPITAL | Age: 74
Discharge: HOME | End: 2024-07-12
Payer: MEDICARE

## 2024-07-09 VITALS
RESPIRATION RATE: 16 BRPM | TEMPERATURE: 98.8 F | OXYGEN SATURATION: 98 % | SYSTOLIC BLOOD PRESSURE: 120 MMHG | HEART RATE: 88 BPM | DIASTOLIC BLOOD PRESSURE: 68 MMHG

## 2024-07-09 PROCEDURE — 6360000002 HC RX W HCPCS: Performed by: PHYSICAL MEDICINE & REHABILITATION

## 2024-07-09 PROCEDURE — 64494 INJ PARAVERT F JNT L/S 2 LEV: CPT

## 2024-07-09 PROCEDURE — 64493 INJ PARAVERT F JNT L/S 1 LEV: CPT

## 2024-07-09 PROCEDURE — 64494 INJ PARAVERT F JNT L/S 2 LEV: CPT | Performed by: PHYSICAL MEDICINE & REHABILITATION

## 2024-07-09 PROCEDURE — 64493 INJ PARAVERT F JNT L/S 1 LEV: CPT | Performed by: PHYSICAL MEDICINE & REHABILITATION

## 2024-07-09 RX ORDER — ROPIVACAINE HYDROCHLORIDE 2 MG/ML
10 INJECTION, SOLUTION EPIDURAL; INFILTRATION; PERINEURAL ONCE
Status: COMPLETED | OUTPATIENT
Start: 2024-07-09 | End: 2024-07-09

## 2024-07-09 RX ADMIN — ROPIVACAINE HYDROCHLORIDE 4 ML: 2 INJECTION EPIDURAL; INFILTRATION; PERINEURAL at 11:08

## 2024-07-09 ASSESSMENT — PAIN DESCRIPTION - DESCRIPTORS: DESCRIPTORS: ACHING

## 2024-07-09 ASSESSMENT — PAIN SCALES - GENERAL: PAINLEVEL_OUTOF10: 0

## 2024-07-09 ASSESSMENT — PAIN - FUNCTIONAL ASSESSMENT: PAIN_FUNCTIONAL_ASSESSMENT: 0-10

## 2024-07-09 NOTE — INTERVAL H&P NOTE
Update History & Physical    The patient's History and Physical of Mimi 10, 2024 was reviewed. There was no change. The surgical site was confirmed by the patient and me. Cardiology note reviewed.    Plan: The risks, benefits, expected outcome, and alternative to the recommended procedure have been discussed with the patient. Patient understands and wants to proceed with the procedure.     Electronically signed by DIANE BROWN MD on 7/9/2024 at 10:56 AM

## 2024-07-09 NOTE — PROCEDURES
DIAGNOTIC LUMBAR MEDIAL BRANCH BLOCKS  PROCEDURE REPORT TRIAL #1      PATIENT:  Shira Norwood  YOB: 1950  DATE OF SERVICE:  7/9/2024  SITE:  [facility]: Special [place] [address] [place] [address]    PRE-PROCEDURE DIAGNOSIS:  Lumbar Facet Arthopathy    POST-PROCEDURE DIAGNOSIS:  Same                PROCEDURE:    bilateral   diagnostic lumbar medial branch blocks via   L3, L4, and L5  for suspected facet mediated pain from L3/4 and L4/5.      PRE-PROCEDURE NOTE:  The details of the procedure have been discussed with the patient, including the risks, benefits and alternative options and an informed consent was obtained. The availability of a responsible adult to escort the patient following the procedure were confirmed.    PROCEDURE NOTE:  The patient was brought to the procedure suite and positioned on the fluoroscopy table in the prone position. Physiologic monitors were applied. The skin was prepped in the standard surgical fashion and sterile drapes were applied over the procedure site.     Under AP fluoroscopic guidance a 25-gauge, 3-1/2 inch short bevel spinal needle was advanced to the junction of the superior articular process and transverse process of L3, L4 , and L5.    After negative vascular aspiration at each site, then  0.5 mL of 0.2% ropivacaine was injected at each location.      The procedure was performed on the contralateral side in the same fashion.    The needles were removed intact.  The area was thoroughly cleaned and sterile bandages applied as necessary. The patient tolerated the procedure well.    Patient will be called to assess the degree of pain relief and the ability to do functional activities. Patient reported matthew-procedural pain on Visual Analog Scale:  pre-10; post-0.      DIANE BROWN MD 7/9/2024 11:00 AMPROCEDURE NOTE  Date: 7/9/2024   Name: Shira Norwood  YOB: 1950    Procedures

## 2024-07-10 ENCOUNTER — CLINICAL DOCUMENTATION (OUTPATIENT)
Age: 74
End: 2024-07-10

## 2024-07-10 NOTE — PROGRESS NOTES
AFTAB COLVIN  1950    Trial #1 7/9/2024  Medial Branch Block Bilateral L3/4, L4/5    07/03/2024 Pre-Procedure Pain Level: 8  07/10/2024 Post-Procedure Pain Level 8   The percent of pain relief 10%  Patient stated she only felt a little bit of relief from this procedure    Activities Performed: Activities Performed: standing, increased walking, bending, and kitchen chores    Trial #2  CANCELLED      Pre-Procedure Pain Level:    Post Procedure Pain Level    The Percent of pain relief %    Activities Performed:     Follow Up Appointment   Patient has a follow up appointment with  on 8/7/2024

## 2024-07-30 ENCOUNTER — OFFICE VISIT (OUTPATIENT)
Age: 74
End: 2024-07-30
Payer: MEDICARE

## 2024-07-30 DIAGNOSIS — M17.11 PRIMARY OSTEOARTHRITIS OF RIGHT KNEE: ICD-10-CM

## 2024-07-30 DIAGNOSIS — M85.861 OSTEOPENIA OF RIGHT LOWER LEG: ICD-10-CM

## 2024-07-30 DIAGNOSIS — S83.411A SPRAIN OF MEDIAL COLLATERAL LIGAMENT OF RIGHT KNEE, INITIAL ENCOUNTER: Primary | ICD-10-CM

## 2024-07-30 DIAGNOSIS — M25.561 ACUTE PAIN OF RIGHT KNEE: ICD-10-CM

## 2024-07-30 PROCEDURE — G8427 DOCREV CUR MEDS BY ELIG CLIN: HCPCS | Performed by: ORTHOPAEDIC SURGERY

## 2024-07-30 PROCEDURE — 3017F COLORECTAL CA SCREEN DOC REV: CPT | Performed by: ORTHOPAEDIC SURGERY

## 2024-07-30 PROCEDURE — 99214 OFFICE O/P EST MOD 30 MIN: CPT | Performed by: ORTHOPAEDIC SURGERY

## 2024-07-30 PROCEDURE — 1123F ACP DISCUSS/DSCN MKR DOCD: CPT | Performed by: ORTHOPAEDIC SURGERY

## 2024-07-30 PROCEDURE — G8399 PT W/DXA RESULTS DOCUMENT: HCPCS | Performed by: ORTHOPAEDIC SURGERY

## 2024-07-30 PROCEDURE — G8420 CALC BMI NORM PARAMETERS: HCPCS | Performed by: ORTHOPAEDIC SURGERY

## 2024-07-30 PROCEDURE — 73560 X-RAY EXAM OF KNEE 1 OR 2: CPT | Performed by: ORTHOPAEDIC SURGERY

## 2024-07-30 PROCEDURE — 1036F TOBACCO NON-USER: CPT | Performed by: ORTHOPAEDIC SURGERY

## 2024-07-30 PROCEDURE — 1090F PRES/ABSN URINE INCON ASSESS: CPT | Performed by: ORTHOPAEDIC SURGERY

## 2024-07-30 RX ORDER — ACETAMINOPHEN 500 MG
500 TABLET ORAL 2 TIMES DAILY PRN
Qty: 60 TABLET | Refills: 0 | Status: SHIPPED | OUTPATIENT
Start: 2024-07-30

## 2024-07-30 NOTE — PROGRESS NOTES
AMBULATORY PROGRESS NOTE      Patient: Shira Norwood             MRN: [id number]     SSN: [id number] There is no height or weight on file to calculate BMI.  YOB: 1950     AGE: 73 y.o.       EX: female    PCP: Wendy Gillette PA       IMPRESSION //  DIAGNOSIS AND TREATMENT PLAN      Shira Norwood has a diagnosis of:      DIAGNOSES    1. Sprain of medial collateral ligament of right knee, initial encounter    2. Acute pain of right knee    3. Primary osteoarthritis of right knee    4. Osteopenia of right lower leg          PLAN:    1. Provided neoprene knee brace for the right knee  2. Recommended icing the right knee regularly for the swelling      RTO 3 weeks    Orders Placed This Encounter    [29001] Knee 1-2V    DJO Hinged Knee Brace     Patient was prescribed a DJO Hinged Knee brace.  The left knee will require stabilization / immobilization from this semi-rigid / rigid orthosis to improve their function.  The orthosis will assist in protecting the affected area, provide functional support and facilitate healing.    The patient was educated and fit by a healthcare professional with expert knowledge and specialization in brace application while under the direct supervision of the physician.  Verbal and written instructions for the use of and application of this item were provided.   They were instructed to contact the office immediately should the brace result in increased pain, decreased sensation, increased swelling or worsening of the condition.    acetaminophen (TYLENOL) 500 MG tablet     Sig: Take 1 tablet by mouth 2 times daily as needed for Pain     Dispense:  60 tablet     Refill:  0        There are no Patient Instructions on file for this visit.      Please follow up with your PCP for any health maintenance as recommended.         Shira Norwood  expresses understanding of the diagnosis, treatment plan, and all of their proposed questions were answered to their

## 2024-08-02 RX ORDER — FLUDROCORTISONE ACETATE 0.1 MG/1
0.1 TABLET ORAL DAILY
Qty: 90 TABLET | Refills: 3 | Status: SHIPPED | OUTPATIENT
Start: 2024-08-02

## 2024-08-02 NOTE — TELEPHONE ENCOUNTER
Requested Prescriptions     Pending Prescriptions Disp Refills    fludrocortisone (FLORINEF) 0.1 MG tablet 90 tablet 3     Sig: Take 1 tablet by mouth daily

## 2024-08-07 ENCOUNTER — OFFICE VISIT (OUTPATIENT)
Age: 74
End: 2024-08-07
Payer: MEDICARE

## 2024-08-07 VITALS — WEIGHT: 141 LBS | BODY MASS INDEX: 24.2 KG/M2

## 2024-08-07 DIAGNOSIS — G89.29 CHRONIC LEFT SACROILIAC JOINT PAIN: Primary | ICD-10-CM

## 2024-08-07 DIAGNOSIS — M53.3 CHRONIC LEFT SACROILIAC JOINT PAIN: Primary | ICD-10-CM

## 2024-08-07 DIAGNOSIS — M47.816 FACET ARTHRITIS, DEGENERATIVE, LUMBAR SPINE: ICD-10-CM

## 2024-08-07 PROCEDURE — 1036F TOBACCO NON-USER: CPT | Performed by: PHYSICAL MEDICINE & REHABILITATION

## 2024-08-07 PROCEDURE — 99212 OFFICE O/P EST SF 10 MIN: CPT | Performed by: PHYSICAL MEDICINE & REHABILITATION

## 2024-08-07 PROCEDURE — 3017F COLORECTAL CA SCREEN DOC REV: CPT | Performed by: PHYSICAL MEDICINE & REHABILITATION

## 2024-08-07 PROCEDURE — G8399 PT W/DXA RESULTS DOCUMENT: HCPCS | Performed by: PHYSICAL MEDICINE & REHABILITATION

## 2024-08-07 PROCEDURE — G8420 CALC BMI NORM PARAMETERS: HCPCS | Performed by: PHYSICAL MEDICINE & REHABILITATION

## 2024-08-07 PROCEDURE — 1123F ACP DISCUSS/DSCN MKR DOCD: CPT | Performed by: PHYSICAL MEDICINE & REHABILITATION

## 2024-08-07 PROCEDURE — G8427 DOCREV CUR MEDS BY ELIG CLIN: HCPCS | Performed by: PHYSICAL MEDICINE & REHABILITATION

## 2024-08-07 PROCEDURE — 1090F PRES/ABSN URINE INCON ASSESS: CPT | Performed by: PHYSICAL MEDICINE & REHABILITATION

## 2024-08-07 NOTE — PROGRESS NOTES
Shira WES Norwood presents today for   Chief Complaint   Patient presents with    Back Pain     Lumbar spine       Is someone accompanying this pt? no    Is the patient using any DME equipment during OV? no    Depression Screening:       No data to display                Learning Assessment:  Failed to redirect to the Timeline version of the [employer] Smart[employer].    Abuse Screening:       No data to display                Fall Risk  Failed to redirect to the Timeline version of the [employer] Smart[employer].    OPIOID RISK TOOL  Failed to redirect to the Timeline version of the [employer] Smart[employer].    Coordination of Care:  1. Have you been to the ER, urgent care clinic since your last visit? no  Hospitalized since your last visit? no    2. Have you seen or consulted any other health care providers outside of the [facility] since your last visit? no Include any pap smears or colon screening. no

## 2024-08-07 NOTE — PROGRESS NOTES
[facility]  [address] [place] [address], [address]  [place] [address]  Phone: [phone]  Fax: [phone]      Patient: Shira Norwood                                                                              MRN: [id number]        YOB: 1950          AGE: 73 y.o.             PCP: Wendy Gillette PA  Date:  08/07/24    Reason for Consultation: Back Pain (Lumbar spine)      HPI:  Shira Norwood is a 73 y.o. female with relevant PMH of hearing loss who presented with neck  pain and chronic back pain .  The back pain  has been present for over 10 years.  She was previously seen here by Dr. Toledo and tried sacroiliac joint injections which she believes were helpful.   The neck pain began 8 months prior. Denies any precipitating incident or trauma.   She was seen in the ED at [facility] 12/28/2023- and had a CT scan of her abd and pelvis which was unremarkable. She tried a repeat left SI joint injection 1/30/2024 which gave about 1 week of relief but pain has returned.  Mri lumbar spine 4/3/2024 demonstrates facet arthritis L2-L5.  She tried MBB with Dr. Restrepo but only had about 15% relief       She sees Dr. Montenegro for her shoulder pain.    Neurologic symptoms: No numbness, tingling, weakness, bowel or bladder changes.  No recent falls      Location: The pain is located in the neck pain 2. Left SI joint   Radiation: The pain does not radiate .    Pain Score: Currently: 5/10   Quality: Pain is of a pulling, stiff quality.    Aggravating: Pain is exacerbated by walking and turning neck  Alleviating: The pain is alleviated by sitting    Prior Treatments:  Physical therapy: Physician guided home exercises since 1/22/204  Injections:Yes- bilateral SI joint injections in 2015, 2014, 2013- stopped because of insurance issues   1/30/2024- left SI joint injections 1 week of relief 75%  7/9/2024- L3,4,5 MBB- only 15% relief   5/3/2024- left shoulder injection-

## 2024-08-26 ENCOUNTER — OFFICE VISIT (OUTPATIENT)
Age: 74
End: 2024-08-26
Payer: MEDICARE

## 2024-08-26 VITALS — BODY MASS INDEX: 24.2 KG/M2 | HEIGHT: 64 IN

## 2024-08-26 DIAGNOSIS — M25.561 RIGHT KNEE PAIN, UNSPECIFIED CHRONICITY: Primary | ICD-10-CM

## 2024-08-26 DIAGNOSIS — M17.11 PRIMARY OSTEOARTHRITIS OF RIGHT KNEE: ICD-10-CM

## 2024-08-26 DIAGNOSIS — S83.411A SPRAIN OF MEDIAL COLLATERAL LIGAMENT OF RIGHT KNEE, INITIAL ENCOUNTER: ICD-10-CM

## 2024-08-26 PROCEDURE — 1036F TOBACCO NON-USER: CPT | Performed by: ORTHOPAEDIC SURGERY

## 2024-08-26 PROCEDURE — G8428 CUR MEDS NOT DOCUMENT: HCPCS | Performed by: ORTHOPAEDIC SURGERY

## 2024-08-26 PROCEDURE — 73564 X-RAY EXAM KNEE 4 OR MORE: CPT | Performed by: ORTHOPAEDIC SURGERY

## 2024-08-26 PROCEDURE — G8399 PT W/DXA RESULTS DOCUMENT: HCPCS | Performed by: ORTHOPAEDIC SURGERY

## 2024-08-26 PROCEDURE — 3017F COLORECTAL CA SCREEN DOC REV: CPT | Performed by: ORTHOPAEDIC SURGERY

## 2024-08-26 PROCEDURE — 1123F ACP DISCUSS/DSCN MKR DOCD: CPT | Performed by: ORTHOPAEDIC SURGERY

## 2024-08-26 PROCEDURE — 99213 OFFICE O/P EST LOW 20 MIN: CPT | Performed by: ORTHOPAEDIC SURGERY

## 2024-08-26 PROCEDURE — G8420 CALC BMI NORM PARAMETERS: HCPCS | Performed by: ORTHOPAEDIC SURGERY

## 2024-08-26 PROCEDURE — 1090F PRES/ABSN URINE INCON ASSESS: CPT | Performed by: ORTHOPAEDIC SURGERY

## 2024-08-26 NOTE — PROGRESS NOTES
Patient: Shira Norwood                MRN: [id number]       SSN: [id number]  YOB: 1950        AGE: 74 y.o.        SEX: female  BMI: Body mass index is 24.2 kg/m².    PCP: Wendy Gillette PA  08/26/24    Chief Complaint: Knee Pain (Right knee pain )      1. Right knee pain, unspecified chronicity  -     AMB POC XRAY, KNEE; COMPLETE, 4+ VIEW  2. Primary osteoarthritis of right knee  3. Sprain of medial collateral ligament of right knee, initial encounter        HPI:  Shira Norwood is a 74 y.o. female with chief complaint of   Chief Complaint   Patient presents with    Knee Pain     Right knee pain      New patient to me for right knee pain.  She fell about a month ago and saw Dr. Cervantes.  She received a knee brace and over-the-counter medications.  She does describe a valgus moment with her fall.  She has been using a brace up until about a week or 2 ago and Tylenol and ibuprofen.  The pain has been improving.  She has had a left total knee arthroplasty in the past.  She also sees Dr. Montenegro for shoulder.    Known risk factors for perioperative complications: Orthostatic hypotension    IMAGING:  Imaging read by myself and interpreted as follows:    August 26, 2024:  4 view x-rays of the right knee including AP, lateral, sunrise and notch view taken at the [facility] office demonstrates between 50 and 75% medial joint space narrowing with subchondral sclerosis particularly on the femoral side and small osteophytes.  There are also small osteophytes off the patellofemoral joint.      PHYSICAL EXAMINATION:  Ht 1.626 m (5' 4\")   BMI 24.20 kg/m²   Body mass index is 24.2 kg/m².  Wt Readings from Last 3 Encounters:   08/07/24 64 kg (141 lb)   07/02/24 63.5 kg (140 lb)   06/24/24 72.6 kg (160 lb)     Hemoglobin A1C   Date Value Ref Range Status   08/10/2020 5.6 4.2 - 5.6 % Final     Comment:     (NOTE)  HbA1C Interpretive Ranges  <5.7              Normal  5.7 - 6.4         Consider  Exposure: Not on file         Past Medical History:   Diagnosis Date    Anxiety disorder     Arthritis     Basal joint arthritis, left thumb    GERD (gastroesophageal reflux disease)     Headache(784.0)     Hearing reduced     Left knee pain     Lower back pain 2010    Menopause     Age 45    Osteoarthritis of left knee     Pain of left thumb     Primary localized osteoarthritis of left knee 2020    Psychiatric disorder     depression    Sacroiliitis (HCC) 9/15/2010    SI (sacroiliac) joint dysfunction     Sinus trouble     Ulcer 2006    polyp, positive heme occult stools, denies ulcer       Family History   Problem Relation Age of Onset    Heart Failure Mother          at age 83 of congestive heart failure    Cancer Father          at age 55 of lung cancer; smoker and asbestos exposure    Heart Disease Mother        Current Outpatient Medications   Medication Sig Dispense Refill    fludrocortisone (FLORINEF) 0.1 MG tablet Take 1 tablet by mouth daily 90 tablet 3    acetaminophen (TYLENOL) 500 MG tablet Take 1 tablet by mouth 2 times daily as needed for Pain 60 tablet 0    omeprazole (PRILOSEC) 20 MG delayed release capsule Take 1 capsule by mouth daily      vitamin C (ASCORBIC ACID) 500 MG tablet Take 1 tablet by mouth daily      calcium carbonate 1500 (600 Ca) MG TABS tablet Take 2 tablets by mouth Daily      buPROPion (WELLBUTRIN SR) 150 MG extended release tablet Take 1 tablet by mouth daily      cyanocobalamin 500 MCG tablet Take 1 tablet by mouth daily      FLUoxetine HCl 60 MG TABS Take 60 mg by mouth daily      rosuvastatin (CRESTOR) 10 MG tablet Take 1 tablet by mouth daily      traZODone (DESYREL) 100 MG tablet Take 1 tablet by mouth nightly       No current facility-administered medications for this visit.        Allergies   Allergen Reactions    Topiramate Other (See Comments)     Whole body tingling  Blurry vision       Past Surgical History:   Procedure Laterality Date    COLONOSCOPY

## 2024-09-23 ENCOUNTER — OFFICE VISIT (OUTPATIENT)
Age: 74
End: 2024-09-23
Payer: MEDICARE

## 2024-09-23 VITALS
WEIGHT: 144 LBS | HEART RATE: 83 BPM | HEIGHT: 64 IN | OXYGEN SATURATION: 96 % | DIASTOLIC BLOOD PRESSURE: 71 MMHG | SYSTOLIC BLOOD PRESSURE: 133 MMHG | BODY MASS INDEX: 24.59 KG/M2 | TEMPERATURE: 98 F

## 2024-09-23 DIAGNOSIS — M53.3 PAIN OF BOTH SACROILIAC JOINTS: Primary | ICD-10-CM

## 2024-09-23 PROCEDURE — 1036F TOBACCO NON-USER: CPT | Performed by: PHYSICAL MEDICINE & REHABILITATION

## 2024-09-23 PROCEDURE — G8399 PT W/DXA RESULTS DOCUMENT: HCPCS | Performed by: PHYSICAL MEDICINE & REHABILITATION

## 2024-09-23 PROCEDURE — 99214 OFFICE O/P EST MOD 30 MIN: CPT | Performed by: PHYSICAL MEDICINE & REHABILITATION

## 2024-09-23 PROCEDURE — G8427 DOCREV CUR MEDS BY ELIG CLIN: HCPCS | Performed by: PHYSICAL MEDICINE & REHABILITATION

## 2024-09-23 PROCEDURE — 1123F ACP DISCUSS/DSCN MKR DOCD: CPT | Performed by: PHYSICAL MEDICINE & REHABILITATION

## 2024-09-23 PROCEDURE — 3017F COLORECTAL CA SCREEN DOC REV: CPT | Performed by: PHYSICAL MEDICINE & REHABILITATION

## 2024-09-23 PROCEDURE — 1090F PRES/ABSN URINE INCON ASSESS: CPT | Performed by: PHYSICAL MEDICINE & REHABILITATION

## 2024-09-23 PROCEDURE — G8420 CALC BMI NORM PARAMETERS: HCPCS | Performed by: PHYSICAL MEDICINE & REHABILITATION

## 2024-12-10 ENCOUNTER — OFFICE VISIT (OUTPATIENT)
Age: 74
End: 2024-12-10
Payer: MEDICARE

## 2024-12-10 VITALS
BODY MASS INDEX: 24.41 KG/M2 | SYSTOLIC BLOOD PRESSURE: 126 MMHG | HEART RATE: 91 BPM | DIASTOLIC BLOOD PRESSURE: 64 MMHG | WEIGHT: 143 LBS | OXYGEN SATURATION: 96 % | HEIGHT: 64 IN

## 2024-12-10 DIAGNOSIS — E78.00 PURE HYPERCHOLESTEROLEMIA: ICD-10-CM

## 2024-12-10 DIAGNOSIS — I25.10 CORONARY ATHEROSCLEROSIS DUE TO CALCIFIED CORONARY LESION: Primary | ICD-10-CM

## 2024-12-10 DIAGNOSIS — R55 NEUROCARDIOGENIC SYNCOPE: ICD-10-CM

## 2024-12-10 DIAGNOSIS — I25.84 CORONARY ATHEROSCLEROSIS DUE TO CALCIFIED CORONARY LESION: Primary | ICD-10-CM

## 2024-12-10 PROCEDURE — 1090F PRES/ABSN URINE INCON ASSESS: CPT | Performed by: INTERNAL MEDICINE

## 2024-12-10 PROCEDURE — 1036F TOBACCO NON-USER: CPT | Performed by: INTERNAL MEDICINE

## 2024-12-10 PROCEDURE — 99214 OFFICE O/P EST MOD 30 MIN: CPT | Performed by: INTERNAL MEDICINE

## 2024-12-10 PROCEDURE — 3017F COLORECTAL CA SCREEN DOC REV: CPT | Performed by: INTERNAL MEDICINE

## 2024-12-10 PROCEDURE — 1159F MED LIST DOCD IN RCRD: CPT | Performed by: INTERNAL MEDICINE

## 2024-12-10 PROCEDURE — 1123F ACP DISCUSS/DSCN MKR DOCD: CPT | Performed by: INTERNAL MEDICINE

## 2024-12-10 PROCEDURE — G8399 PT W/DXA RESULTS DOCUMENT: HCPCS | Performed by: INTERNAL MEDICINE

## 2024-12-10 PROCEDURE — 1160F RVW MEDS BY RX/DR IN RCRD: CPT | Performed by: INTERNAL MEDICINE

## 2024-12-10 PROCEDURE — G8427 DOCREV CUR MEDS BY ELIG CLIN: HCPCS | Performed by: INTERNAL MEDICINE

## 2024-12-10 PROCEDURE — G8484 FLU IMMUNIZE NO ADMIN: HCPCS | Performed by: INTERNAL MEDICINE

## 2024-12-10 PROCEDURE — G8420 CALC BMI NORM PARAMETERS: HCPCS | Performed by: INTERNAL MEDICINE

## 2024-12-10 RX ORDER — ROSUVASTATIN CALCIUM 20 MG/1
20 TABLET, COATED ORAL DAILY
Qty: 90 TABLET | Refills: 3 | Status: SHIPPED | OUTPATIENT
Start: 2024-12-10

## 2024-12-10 ASSESSMENT — ENCOUNTER SYMPTOMS
EYES NEGATIVE: 1
RESPIRATORY NEGATIVE: 1
GASTROINTESTINAL NEGATIVE: 1

## 2024-12-10 NOTE — PROGRESS NOTES
1. Have you been to the ER, urgent care clinic since your last visit?  Hospitalized since your last visit?     no      2.  Where do you normally have your labs drawn?       3. Do you need any refills today?   no    4. Which local pharmacy do you use (enter pharmacy)?   [facility]    5. Which mail order pharmacy do you use (enter pharmacy)?        6. Are you here for surgical clearance and if so who will be doing your     procedure/surgery (care team)?   no      
  1-49% Stenosis of the Internal Carotid Arteries  Antegrade flow of the Vertebral Arteries  Normal flow of the Subclavian Arteries  Interpretation Summary 8/2022         Left Ventricle: Normal left ventricular systolic function with a visually estimated EF of 55 - 60%. Left ventricle size is normal. Normal wall thickness. Normal wall motion. Diastolic dysfunction present with normal LV EF.    Mitral Valve: Mild regurgitation.    Tricuspid Valve: Mild regurgitation. The estimated PASP is 28 mmHg.    Interatrial Septum: No PFO present visible by color Doppler.    8/2022 tilt table  Hypotension with blood pressure of 43/26 with unresponsiveness.  Pulse reported at 78.  Likely autonomic dysfunction    CARDIOLOGY REPORT: 7/2022     The patient underwent a limited noncontrast CT scan of her chest with cardiac  gating to evaluate for coronary arterial calcification. Using the Agatston  method the coronary calcium score was calculated. There is a small amount of  coronary calcification present in her left anterior descending artery and her  right coronary artery. Coronary calcium score calculated to be 30.     IMPRESSION.     Coronary calcium score 30.      ASSESSMENT & PLAN    Lab Results   Component Value Date    CHOL 172 06/25/2024    CHOL 161 07/06/2022    CHOL 208 (H) 05/15/2020     Lab Results   Component Value Date    TRIG 68 06/25/2024    TRIG 86 07/06/2022    TRIG 62 05/15/2020     Lab Results   Component Value Date    HDL 85 (H) 06/25/2024    HDL 88 (H) 07/06/2022    HDL 82 (H) 05/15/2020     Lab Results   Component Value Date    LDL 73.4 06/25/2024    LDL 55.8 07/06/2022    .6 (H) 05/15/2020     Lab Results   Component Value Date    VLDL 13.6 06/25/2024    VLDL 17.2 07/06/2022    VLDL 12.4 05/15/2020       Pertinent laboratory and test data reviewed and discussed with patient.        6/16/2022  Seen for new onset progressive dizziness.  Could be related to medication.  Set up tilt table to evaluate for

## 2025-02-17 ENCOUNTER — TRANSCRIBE ORDERS (OUTPATIENT)
Facility: HOSPITAL | Age: 75
End: 2025-02-17

## 2025-02-17 DIAGNOSIS — Z12.31 VISIT FOR SCREENING MAMMOGRAM: Primary | ICD-10-CM

## 2025-02-24 ENCOUNTER — HOSPITAL ENCOUNTER (OUTPATIENT)
Facility: HOSPITAL | Age: 75
Discharge: HOME | End: 2025-02-27
Payer: MEDICARE

## 2025-02-24 VITALS — BODY MASS INDEX: 24.43 KG/M2 | WEIGHT: 143.08 LBS | HEIGHT: 64 IN

## 2025-02-24 DIAGNOSIS — Z12.31 VISIT FOR SCREENING MAMMOGRAM: ICD-10-CM

## 2025-02-24 PROCEDURE — 77063 BREAST TOMOSYNTHESIS BI: CPT

## 2025-04-08 ENCOUNTER — HOSPITAL ENCOUNTER (OUTPATIENT)
Facility: HOSPITAL | Age: 75
Discharge: HOME | End: 2025-04-11
Payer: MEDICARE

## 2025-04-08 DIAGNOSIS — E78.00 PURE HYPERCHOLESTEROLEMIA: ICD-10-CM

## 2025-04-08 DIAGNOSIS — I25.10 CORONARY ATHEROSCLEROSIS DUE TO CALCIFIED CORONARY LESION: ICD-10-CM

## 2025-04-08 DIAGNOSIS — I25.84 CORONARY ATHEROSCLEROSIS DUE TO CALCIFIED CORONARY LESION: ICD-10-CM

## 2025-04-08 LAB
ALBUMIN SERPL-MCNC: 3.4 G/DL (ref 3.4–5)
ALBUMIN/GLOB SERPL: 1 (ref 0.8–1.7)
ALP SERPL-CCNC: 106 U/L (ref 45–117)
ALT SERPL-CCNC: 25 U/L (ref 13–56)
ANION GAP SERPL CALC-SCNC: 3 MMOL/L (ref 3–18)
AST SERPL-CCNC: 21 U/L (ref 10–38)
BILIRUB DIRECT SERPL-MCNC: 0.1 MG/DL (ref 0–0.2)
BILIRUB SERPL-MCNC: 0.4 MG/DL (ref 0.2–1)
BUN SERPL-MCNC: 9 MG/DL (ref 7–18)
BUN/CREAT SERPL: 12 (ref 12–20)
CALCIUM SERPL-MCNC: 9.2 MG/DL (ref 8.5–10.1)
CHLORIDE SERPL-SCNC: 111 MMOL/L (ref 100–111)
CHOLEST SERPL-MCNC: 157 MG/DL
CO2 SERPL-SCNC: 29 MMOL/L (ref 21–32)
CREAT SERPL-MCNC: 0.76 MG/DL (ref 0.6–1.3)
GLOBULIN SER CALC-MCNC: 3.4 G/DL (ref 2–4)
GLUCOSE SERPL-MCNC: 89 MG/DL (ref 74–99)
HDLC SERPL-MCNC: 88 MG/DL (ref 40–60)
HDLC SERPL: 1.8 (ref 0–5)
LDLC SERPL CALC-MCNC: 58.2 MG/DL (ref 0–100)
LIPID PANEL: ABNORMAL
POTASSIUM SERPL-SCNC: 4.2 MMOL/L (ref 3.5–5.5)
PROT SERPL-MCNC: 6.8 G/DL (ref 6.4–8.2)
SODIUM SERPL-SCNC: 143 MMOL/L (ref 136–145)
TRIGL SERPL-MCNC: 54 MG/DL
VLDLC SERPL CALC-MCNC: 10.8 MG/DL

## 2025-04-08 PROCEDURE — 80076 HEPATIC FUNCTION PANEL: CPT

## 2025-04-08 PROCEDURE — 36415 COLL VENOUS BLD VENIPUNCTURE: CPT

## 2025-04-08 PROCEDURE — 80061 LIPID PANEL: CPT

## 2025-04-08 PROCEDURE — 80048 BASIC METABOLIC PNL TOTAL CA: CPT

## 2025-07-11 RX ORDER — ROSUVASTATIN CALCIUM 20 MG/1
20 TABLET, COATED ORAL DAILY
Qty: 90 TABLET | Refills: 1 | Status: SHIPPED | OUTPATIENT
Start: 2025-07-11

## 2025-07-14 RX ORDER — FLUDROCORTISONE ACETATE 0.1 MG/1
0.1 TABLET ORAL DAILY
Qty: 90 TABLET | Refills: 1 | Status: SHIPPED | OUTPATIENT
Start: 2025-07-14

## (undated) DEVICE — SYRINGE MED 25GA 3ML L5/8IN SUBQ PLAS W/ DETACH NDL SFTY

## (undated) DEVICE — SYRINGE 20ML LL S/C 50

## (undated) DEVICE — FORCEPS BX L240CM JAW DIA2.8MM L CAP W/ NDL MIC MESH TOOTH

## (undated) DEVICE — SOL INJ L R 1000ML BG --

## (undated) DEVICE — PIN GUIDE FIX 3.2X62 MM SCREW [GS903A0620322P] [KOMET MEDICAL]

## (undated) DEVICE — MEDI-VAC NON-CONDUCTIVE SUCTION TUBING: Brand: CARDINAL HEALTH

## (undated) DEVICE — CANNULA NSL AD TBNG L14FT STD PVC O2 CRV CONN NONFLARED NSL

## (undated) DEVICE — SUT VCRL + 1 36IN CT1 VIO --

## (undated) DEVICE — SNARE POLYP M W27MMXL240CM OVL STIFF DISP CAPTIVATOR

## (undated) DEVICE — TRAP SPEC POLYP REM STRNR CLN DSGN MAGNIFYING WIND DISP

## (undated) DEVICE — BLADE SAW W13XL90MM 1.19MM PARA

## (undated) DEVICE — FORCEPS BX L240CM JAW DIA2.4MM ORNG L CAP W/ NDL DISP RAD

## (undated) DEVICE — GARMENT COMPR M FOR 13IN FT INTMIT SGL BLDR HEM FORC II

## (undated) DEVICE — CATHETER SUCT TR FL TIP 14FR W/ O CTRL

## (undated) DEVICE — SYRINGE MED 50ML LUERSLIP TIP

## (undated) DEVICE — SOLUTION IV 100ML 0.9% SOD CHL DIL INJ

## (undated) DEVICE — PIN GUIDE FIX 3.2X62 MM SCREW [GS9030620324P] [KOMET MEDICAL]

## (undated) DEVICE — UNDERPAD INCONT W23XL36IN STD BLU POLYPR BK FLUF SFT

## (undated) DEVICE — 3 BONE CEMENT MIXER: Brand: MIXEVAC

## (undated) DEVICE — NEEDLE SPNL 20GA L3.5IN YEL HUB S STL REG WALL FIT STYL W/

## (undated) DEVICE — SOL IRRIGATION INJ NACL 0.9% 500ML BTL

## (undated) DEVICE — ZIP 16 SURGICAL SKIN CLOSURE DEVICE: Brand: ZIP 16 SURGICAL SKIN CLOSURE DEVICE

## (undated) DEVICE — THE CANADY HYBRID PLASMA SCALPEL IS AN ELECTROSURGICAL PLASMA SCALPEL THAT USES AN 85MM BENDABLE PADDLE BLADE TIP. THE ELECTROSURGICAL PLASMA SCALPEL IS USED TO SIMULTANEOUSLY CUT AND COAGULATE BIOLOGICAL TISSUE.: Brand: CANADY HYBRID PLASMA PADDLE BLADE

## (undated) DEVICE — GAUZE,SPONGE,4"X4",16PLY,STRL,LF,10/TRAY: Brand: MEDLINE

## (undated) DEVICE — SOLUTION IRRIG 1000ML H2O STRL BLT

## (undated) DEVICE — SUTURE STRATAFIX SYMMETRIC PDS + 1 SGL ARMED CT 18 IN LEN SXPP1A405

## (undated) DEVICE — NDL SPNE QNCKE 18GX3.5IN LF --

## (undated) DEVICE — YANKAUER,SMOOTH HANDLE,HIGH CAPACITY: Brand: MEDLINE INDUSTRIES, INC.

## (undated) DEVICE — SNARE VASC L240CM LOOP W10MM SHTH DIA2.4MM RND STIFF CLD

## (undated) DEVICE — BLADE SAW 1.19X20X90 MM FOR LG BNE

## (undated) DEVICE — DRSG MEPILES BORDER AG 4X12 -- 5/BX

## (undated) DEVICE — BASIN EMSIS 16OZ GRAPHITE PLAS KID SHP MOLD GRAD FOR ORAL

## (undated) DEVICE — HANDPIECE SET WITH HIGH FLOW TIP AND SUCTION TUBE: Brand: INTERPULSE

## (undated) DEVICE — ENDOSCOPY PUMP TUBING/ CAP SET: Brand: ERBE

## (undated) DEVICE — SUT VCRL + 2-0 36IN CT1 UD --

## (undated) DEVICE — Device

## (undated) DEVICE — SOLUTION SCRB 4OZ 10% PVP I POVIDONE IOD TOP PAINT EXIDINE

## (undated) DEVICE — SYRINGE MED 10ML LUERLOCK TIP W/O SFTY DISP

## (undated) DEVICE — GOWN PLASTIC FILM THMBHKS UNIV BLUE: Brand: CARDINAL HEALTH

## (undated) DEVICE — CANNULA ORIG TL CLR W FOAM CUSHIONS AND 14FT SUPL TB 3 CHN

## (undated) DEVICE — LINER SUCT CANSTR 3000CC PLAS SFT PRE ASSEMB W/OUT TBNG W/

## (undated) DEVICE — BITE BLOCK ENDOSCP UNIV AD 6 TO 9.4 MM

## (undated) DEVICE — STERILE POLYISOPRENE POWDER-FREE SURGICAL GLOVES: Brand: PROTEXIS